# Patient Record
Sex: FEMALE | Race: WHITE | NOT HISPANIC OR LATINO | Employment: OTHER | URBAN - METROPOLITAN AREA
[De-identification: names, ages, dates, MRNs, and addresses within clinical notes are randomized per-mention and may not be internally consistent; named-entity substitution may affect disease eponyms.]

---

## 2017-01-09 ENCOUNTER — TRANSCRIBE ORDERS (OUTPATIENT)
Dept: LAB | Facility: CLINIC | Age: 72
End: 2017-01-09

## 2017-01-09 ENCOUNTER — APPOINTMENT (OUTPATIENT)
Dept: LAB | Facility: CLINIC | Age: 72
End: 2017-01-09
Payer: COMMERCIAL

## 2017-01-09 DIAGNOSIS — E03.9 UNSPECIFIED HYPOTHYROIDISM: ICD-10-CM

## 2017-01-09 DIAGNOSIS — I11.0 HYPERTENSIVE HEART DISEASE WITH CONGESTIVE HEART FAILURE (HCC): ICD-10-CM

## 2017-01-09 DIAGNOSIS — E13.8 DIABETES MELLITUS OF OTHER TYPE WITH COMPLICATION: Primary | ICD-10-CM

## 2017-01-09 DIAGNOSIS — E13.8 DIABETES MELLITUS OF OTHER TYPE WITH COMPLICATION: ICD-10-CM

## 2017-01-09 DIAGNOSIS — E78.5 HYPERLIPIDEMIA, UNSPECIFIED HYPERLIPIDEMIA TYPE: ICD-10-CM

## 2017-01-09 LAB
ALBUMIN SERPL BCP-MCNC: 4 G/DL (ref 3.5–5)
ALP SERPL-CCNC: 52 U/L (ref 46–116)
ALT SERPL W P-5'-P-CCNC: 29 U/L (ref 12–78)
ANION GAP SERPL CALCULATED.3IONS-SCNC: 7 MMOL/L (ref 4–13)
AST SERPL W P-5'-P-CCNC: 16 U/L (ref 5–45)
BASOPHILS # BLD AUTO: 0.04 THOUSANDS/ΜL (ref 0–0.1)
BASOPHILS NFR BLD AUTO: 1 % (ref 0–1)
BILIRUB DIRECT SERPL-MCNC: 0.15 MG/DL (ref 0–0.2)
BILIRUB SERPL-MCNC: 0.39 MG/DL (ref 0.2–1)
BUN SERPL-MCNC: 26 MG/DL (ref 5–25)
CALCIUM SERPL-MCNC: 9.4 MG/DL (ref 8.3–10.1)
CHLORIDE SERPL-SCNC: 104 MMOL/L (ref 100–108)
CHOLEST SERPL-MCNC: 107 MG/DL (ref 50–200)
CO2 SERPL-SCNC: 28 MMOL/L (ref 21–32)
CREAT SERPL-MCNC: 0.98 MG/DL (ref 0.6–1.3)
EOSINOPHIL # BLD AUTO: 0.13 THOUSAND/ΜL (ref 0–0.61)
EOSINOPHIL NFR BLD AUTO: 2 % (ref 0–6)
ERYTHROCYTE [DISTWIDTH] IN BLOOD BY AUTOMATED COUNT: 12.5 % (ref 11.6–15.1)
EST. AVERAGE GLUCOSE BLD GHB EST-MCNC: 148 MG/DL
GFR SERPL CREATININE-BSD FRML MDRD: 55.9 ML/MIN/1.73SQ M
GLUCOSE SERPL-MCNC: 113 MG/DL (ref 65–140)
HBA1C MFR BLD: 6.8 % (ref 4.2–6.3)
HCT VFR BLD AUTO: 43.3 % (ref 34.8–46.1)
HDLC SERPL-MCNC: 46 MG/DL (ref 40–60)
HGB BLD-MCNC: 14.3 G/DL (ref 11.5–15.4)
LDLC SERPL CALC-MCNC: 41 MG/DL (ref 0–100)
LYMPHOCYTES # BLD AUTO: 1.58 THOUSANDS/ΜL (ref 0.6–4.47)
LYMPHOCYTES NFR BLD AUTO: 21 % (ref 14–44)
MCH RBC QN AUTO: 32.1 PG (ref 26.8–34.3)
MCHC RBC AUTO-ENTMCNC: 33 G/DL (ref 31.4–37.4)
MCV RBC AUTO: 97 FL (ref 82–98)
MONOCYTES # BLD AUTO: 0.64 THOUSAND/ΜL (ref 0.17–1.22)
MONOCYTES NFR BLD AUTO: 8 % (ref 4–12)
NEUTROPHILS # BLD AUTO: 5.25 THOUSANDS/ΜL (ref 1.85–7.62)
NEUTS SEG NFR BLD AUTO: 68 % (ref 43–75)
NRBC BLD AUTO-RTO: 0 /100 WBCS
PLATELET # BLD AUTO: 265 THOUSANDS/UL (ref 149–390)
PMV BLD AUTO: 9.9 FL (ref 8.9–12.7)
POTASSIUM SERPL-SCNC: 4.9 MMOL/L (ref 3.5–5.3)
PROT SERPL-MCNC: 7.9 G/DL (ref 6.4–8.2)
RBC # BLD AUTO: 4.45 MILLION/UL (ref 3.81–5.12)
SODIUM SERPL-SCNC: 139 MMOL/L (ref 136–145)
TRIGL SERPL-MCNC: 100 MG/DL
TSH SERPL DL<=0.05 MIU/L-ACNC: 0.53 UIU/ML (ref 0.36–3.74)
WBC # BLD AUTO: 7.66 THOUSAND/UL (ref 4.31–10.16)

## 2017-01-09 PROCEDURE — 84443 ASSAY THYROID STIM HORMONE: CPT

## 2017-01-09 PROCEDURE — 80076 HEPATIC FUNCTION PANEL: CPT

## 2017-01-09 PROCEDURE — 85025 COMPLETE CBC W/AUTO DIFF WBC: CPT

## 2017-01-09 PROCEDURE — 83036 HEMOGLOBIN GLYCOSYLATED A1C: CPT

## 2017-01-09 PROCEDURE — 80048 BASIC METABOLIC PNL TOTAL CA: CPT

## 2017-01-09 PROCEDURE — 36415 COLL VENOUS BLD VENIPUNCTURE: CPT

## 2017-01-09 PROCEDURE — 80061 LIPID PANEL: CPT

## 2017-01-13 ENCOUNTER — ALLSCRIPTS OFFICE VISIT (OUTPATIENT)
Dept: OTHER | Facility: OTHER | Age: 72
End: 2017-01-13

## 2017-07-20 ENCOUNTER — TRANSCRIBE ORDERS (OUTPATIENT)
Dept: LAB | Facility: CLINIC | Age: 72
End: 2017-07-20

## 2017-07-20 ENCOUNTER — APPOINTMENT (OUTPATIENT)
Dept: LAB | Facility: CLINIC | Age: 72
End: 2017-07-20
Payer: COMMERCIAL

## 2017-07-20 DIAGNOSIS — E78.5 OTHER AND UNSPECIFIED HYPERLIPIDEMIA: ICD-10-CM

## 2017-07-20 DIAGNOSIS — E03.9 UNSPECIFIED HYPOTHYROIDISM: ICD-10-CM

## 2017-07-20 DIAGNOSIS — I10 ESSENTIAL HYPERTENSION, MALIGNANT: ICD-10-CM

## 2017-07-20 DIAGNOSIS — E11.9 DIABETES MELLITUS WITHOUT COMPLICATION (HCC): Primary | ICD-10-CM

## 2017-07-20 DIAGNOSIS — E11.9 DIABETES MELLITUS WITHOUT COMPLICATION (HCC): ICD-10-CM

## 2017-07-20 LAB
ALBUMIN SERPL BCP-MCNC: 3.9 G/DL (ref 3.5–5)
ALP SERPL-CCNC: 50 U/L (ref 46–116)
ALT SERPL W P-5'-P-CCNC: 27 U/L (ref 12–78)
ANION GAP SERPL CALCULATED.3IONS-SCNC: 7 MMOL/L (ref 4–13)
AST SERPL W P-5'-P-CCNC: 18 U/L (ref 5–45)
BILIRUB SERPL-MCNC: 0.37 MG/DL (ref 0.2–1)
BUN SERPL-MCNC: 17 MG/DL (ref 5–25)
CALCIUM SERPL-MCNC: 9.4 MG/DL (ref 8.3–10.1)
CHLORIDE SERPL-SCNC: 107 MMOL/L (ref 100–108)
CHOLEST SERPL-MCNC: 98 MG/DL (ref 50–200)
CO2 SERPL-SCNC: 27 MMOL/L (ref 21–32)
CREAT SERPL-MCNC: 0.91 MG/DL (ref 0.6–1.3)
ERYTHROCYTE [DISTWIDTH] IN BLOOD BY AUTOMATED COUNT: 13 % (ref 11.6–15.1)
EST. AVERAGE GLUCOSE BLD GHB EST-MCNC: 134 MG/DL
GFR SERPL CREATININE-BSD FRML MDRD: >60 ML/MIN/1.73SQ M
GLUCOSE P FAST SERPL-MCNC: 90 MG/DL (ref 65–99)
HBA1C MFR BLD: 6.3 % (ref 4.2–6.3)
HCT VFR BLD AUTO: 42.2 % (ref 34.8–46.1)
HDLC SERPL-MCNC: 43 MG/DL (ref 40–60)
HGB BLD-MCNC: 13.9 G/DL (ref 11.5–15.4)
LDLC SERPL CALC-MCNC: 34 MG/DL (ref 0–100)
MCH RBC QN AUTO: 31.9 PG (ref 26.8–34.3)
MCHC RBC AUTO-ENTMCNC: 32.9 G/DL (ref 31.4–37.4)
MCV RBC AUTO: 97 FL (ref 82–98)
PLATELET # BLD AUTO: 267 THOUSANDS/UL (ref 149–390)
PMV BLD AUTO: 10.2 FL (ref 8.9–12.7)
POTASSIUM SERPL-SCNC: 4.6 MMOL/L (ref 3.5–5.3)
PROT SERPL-MCNC: 7.6 G/DL (ref 6.4–8.2)
RBC # BLD AUTO: 4.36 MILLION/UL (ref 3.81–5.12)
SODIUM SERPL-SCNC: 141 MMOL/L (ref 136–145)
TRIGL SERPL-MCNC: 104 MG/DL
TSH SERPL DL<=0.05 MIU/L-ACNC: 2.13 UIU/ML (ref 0.36–3.74)
WBC # BLD AUTO: 8.07 THOUSAND/UL (ref 4.31–10.16)

## 2017-07-20 PROCEDURE — 85027 COMPLETE CBC AUTOMATED: CPT

## 2017-07-20 PROCEDURE — 83036 HEMOGLOBIN GLYCOSYLATED A1C: CPT

## 2017-07-20 PROCEDURE — 80053 COMPREHEN METABOLIC PANEL: CPT

## 2017-07-20 PROCEDURE — 84443 ASSAY THYROID STIM HORMONE: CPT

## 2017-07-20 PROCEDURE — 36415 COLL VENOUS BLD VENIPUNCTURE: CPT

## 2017-07-20 PROCEDURE — 80061 LIPID PANEL: CPT

## 2017-07-24 ENCOUNTER — ALLSCRIPTS OFFICE VISIT (OUTPATIENT)
Dept: OTHER | Facility: OTHER | Age: 72
End: 2017-07-24

## 2017-08-21 ENCOUNTER — GENERIC CONVERSION - ENCOUNTER (OUTPATIENT)
Dept: OTHER | Facility: OTHER | Age: 72
End: 2017-08-21

## 2018-01-06 ENCOUNTER — TRANSCRIBE ORDERS (OUTPATIENT)
Dept: LAB | Facility: CLINIC | Age: 73
End: 2018-01-06

## 2018-01-06 ENCOUNTER — APPOINTMENT (OUTPATIENT)
Dept: LAB | Facility: CLINIC | Age: 73
End: 2018-01-06
Payer: COMMERCIAL

## 2018-01-06 DIAGNOSIS — E78.5 HYPERLIPIDEMIA LDL GOAL <70: ICD-10-CM

## 2018-01-06 DIAGNOSIS — I51.9 MYXEDEMA HEART DISEASE: ICD-10-CM

## 2018-01-06 DIAGNOSIS — I10 ESSENTIAL HYPERTENSION, MALIGNANT: Primary | ICD-10-CM

## 2018-01-06 DIAGNOSIS — E03.9 MYXEDEMA HEART DISEASE: ICD-10-CM

## 2018-01-06 DIAGNOSIS — I10 ESSENTIAL HYPERTENSION, MALIGNANT: ICD-10-CM

## 2018-01-06 DIAGNOSIS — E11.9: ICD-10-CM

## 2018-01-06 LAB
ALBUMIN SERPL BCP-MCNC: 4.1 G/DL (ref 3.5–5)
ALP SERPL-CCNC: 49 U/L (ref 46–116)
ALT SERPL W P-5'-P-CCNC: 30 U/L (ref 12–78)
ANION GAP SERPL CALCULATED.3IONS-SCNC: 4 MMOL/L (ref 4–13)
AST SERPL W P-5'-P-CCNC: 23 U/L (ref 5–45)
BASOPHILS # BLD AUTO: 0.06 THOUSANDS/ΜL (ref 0–0.1)
BASOPHILS NFR BLD AUTO: 1 % (ref 0–1)
BILIRUB DIRECT SERPL-MCNC: 0.12 MG/DL (ref 0–0.2)
BILIRUB SERPL-MCNC: 0.45 MG/DL (ref 0.2–1)
BUN SERPL-MCNC: 30 MG/DL (ref 5–25)
CALCIUM SERPL-MCNC: 8.9 MG/DL (ref 8.3–10.1)
CHLORIDE SERPL-SCNC: 107 MMOL/L (ref 100–108)
CHOLEST SERPL-MCNC: 117 MG/DL (ref 50–200)
CO2 SERPL-SCNC: 29 MMOL/L (ref 21–32)
CREAT SERPL-MCNC: 0.86 MG/DL (ref 0.6–1.3)
CREAT UR-MCNC: 122 MG/DL
EOSINOPHIL # BLD AUTO: 0.15 THOUSAND/ΜL (ref 0–0.61)
EOSINOPHIL NFR BLD AUTO: 2 % (ref 0–6)
ERYTHROCYTE [DISTWIDTH] IN BLOOD BY AUTOMATED COUNT: 12.7 % (ref 11.6–15.1)
EST. AVERAGE GLUCOSE BLD GHB EST-MCNC: 123 MG/DL
GFR SERPL CREATININE-BSD FRML MDRD: 68 ML/MIN/1.73SQ M
GLUCOSE P FAST SERPL-MCNC: 99 MG/DL (ref 65–99)
HBA1C MFR BLD: 5.9 % (ref 4.2–6.3)
HCT VFR BLD AUTO: 41.2 % (ref 34.8–46.1)
HDLC SERPL-MCNC: 59 MG/DL (ref 40–60)
HGB BLD-MCNC: 13.7 G/DL (ref 11.5–15.4)
LDLC SERPL CALC-MCNC: 39 MG/DL (ref 0–100)
LYMPHOCYTES # BLD AUTO: 2.17 THOUSANDS/ΜL (ref 0.6–4.47)
LYMPHOCYTES NFR BLD AUTO: 30 % (ref 14–44)
MCH RBC QN AUTO: 32.6 PG (ref 26.8–34.3)
MCHC RBC AUTO-ENTMCNC: 33.3 G/DL (ref 31.4–37.4)
MCV RBC AUTO: 98 FL (ref 82–98)
MICROALBUMIN UR-MCNC: 36.6 MG/L (ref 0–20)
MICROALBUMIN/CREAT 24H UR: 30 MG/G CREATININE (ref 0–30)
MONOCYTES # BLD AUTO: 0.47 THOUSAND/ΜL (ref 0.17–1.22)
MONOCYTES NFR BLD AUTO: 6 % (ref 4–12)
NEUTROPHILS # BLD AUTO: 4.48 THOUSANDS/ΜL (ref 1.85–7.62)
NEUTS SEG NFR BLD AUTO: 61 % (ref 43–75)
NRBC BLD AUTO-RTO: 0 /100 WBCS
PLATELET # BLD AUTO: 273 THOUSANDS/UL (ref 149–390)
PMV BLD AUTO: 9.4 FL (ref 8.9–12.7)
POTASSIUM SERPL-SCNC: 4.5 MMOL/L (ref 3.5–5.3)
PROT SERPL-MCNC: 7.6 G/DL (ref 6.4–8.2)
RBC # BLD AUTO: 4.2 MILLION/UL (ref 3.81–5.12)
SODIUM SERPL-SCNC: 140 MMOL/L (ref 136–145)
TRIGL SERPL-MCNC: 93 MG/DL
TSH SERPL DL<=0.05 MIU/L-ACNC: 0.58 UIU/ML (ref 0.36–3.74)
WBC # BLD AUTO: 7.36 THOUSAND/UL (ref 4.31–10.16)

## 2018-01-06 PROCEDURE — 83036 HEMOGLOBIN GLYCOSYLATED A1C: CPT

## 2018-01-06 PROCEDURE — 84443 ASSAY THYROID STIM HORMONE: CPT

## 2018-01-06 PROCEDURE — 80048 BASIC METABOLIC PNL TOTAL CA: CPT

## 2018-01-06 PROCEDURE — 85025 COMPLETE CBC W/AUTO DIFF WBC: CPT | Performed by: INTERNAL MEDICINE

## 2018-01-06 PROCEDURE — 80061 LIPID PANEL: CPT

## 2018-01-06 PROCEDURE — 82043 UR ALBUMIN QUANTITATIVE: CPT | Performed by: INTERNAL MEDICINE

## 2018-01-06 PROCEDURE — 36415 COLL VENOUS BLD VENIPUNCTURE: CPT

## 2018-01-06 PROCEDURE — 82570 ASSAY OF URINE CREATININE: CPT | Performed by: INTERNAL MEDICINE

## 2018-01-06 PROCEDURE — 80076 HEPATIC FUNCTION PANEL: CPT

## 2018-01-10 ENCOUNTER — ALLSCRIPTS OFFICE VISIT (OUTPATIENT)
Dept: OTHER | Facility: OTHER | Age: 73
End: 2018-01-10

## 2018-01-12 NOTE — PROGRESS NOTES
Assessment   1  Diabetes (250 00) (E11 9)   2  Hyperlipidemia (272 4) (E78 5)   3  Hypertension (401 9) (I10)   4  Hypothyroidism (244 9) (E03 9)    Plan   Diabetes    · Invokana 300 MG Oral Tablet   · (1) HEMOGLOBIN A1C; Status:Active; Requested for:01Jul2018;    · (1) MICROALBUMIN CREATININE RATIO, RANDOM URINE; Status:Active; Requested for:01Jul2018;   Hyperlipidemia    · (1) LIPID PANEL, FASTING; Status:Active; Requested for:01Jul2018;   Hypertension    · (1) BASIC METABOLIC PROFILE; Status:Active; Requested for:01Jul2018;    · (1) CBC/PLT/DIFF; Status:Active; Requested for:01Jul2018;    · (1) HEPATIC FUNCTION PANEL; Status:Active; Requested for:01Jul2018;   Hypothyroidism    · (1) TSH; Status:Active; Requested for:01Jul2018; Discussion/Summary   Discussion Summary:    Lab data reviewed in detail and compared prior    2 diabetes mellitus remains under excellent control, A1c now down to 5 9, continue on metformin but discontinue Invokana, monitor your sugars as you have been  Continue with weight loss efforts, starting on an exercise regimen with a goal of 30 minutes per day, 5 days per week may be beneficial   and hyperlipidemia remained optimally controlled on present regimen    is stable on levothyroxine   maintenance is up-to-date, patient declines flu and Prevnar due to adverse reaction to Pneumovax and flu shot in 2015   follow-up after labs in 6 months, sooner as needed  Chief Complaint   Chief Complaint Chronic Condition [facility]: Patient is here today for follow up of chronic conditions described in HPI  History of Present Illness   HPI: Feeling very well, slipped on ice last week, got some bruises, but no significant injury  35 lbs last yr, gained back 6 over holidays, but plans to lose it again  -- , tolerating metformin 1000 mg bid  No urinary sx on invokana  rx as directed, bp's nl at home      regular exercise, but keeping active          Review of Systems Complete-Female:      Constitutional: No fever, no chills, feels well, no tiredness, no recent weight gain or weight loss  Eyes: No complaints of eye pain, no red eyes, no eyesight problems, no discharge, no dry eyes, no itching of eyes  ENT: no complaints of earache, no loss of hearing, no nose bleeds, no nasal discharge, no sore throat, no hoarseness  Cardiovascular: No complaints of slow heart rate, no fast heart rate, no chest pain, no palpitations, no leg claudication, no lower extremity edema  Respiratory: No complaints of shortness of breath, no wheezing, no cough, no SOB on exertion, no orthopnea, no PND,-- no shortness of breath,-- no cough,-- no orthopnea,-- no wheezing,-- no shortness of breath during exertion-- and-- no PND  Gastrointestinal: as noted in HPI  Genitourinary: No complaints of dysuria, no incontinence, no pelvic pain, no dysmenorrhea, no vaginal discharge or bleeding  Musculoskeletal: as noted in HPI  Integumentary: as noted in HPI  Neurological: No complaints of headache, no confusion, no convulsions, no numbness, no dizziness or fainting, no tingling, no limb weakness, no difficulty walking  Psychiatric: Not suicidal, no sleep disturbance, no anxiety or depression, no change in personality, no emotional problems  Endocrine: No complaints of proptosis, no hot flashes, no muscle weakness, no deepening of the voice, no feelings of weakness  Hematologic/Lymphatic: No complaints of swollen glands, no swollen glands in the neck, does not bleed easily, does not bruise easily  Active Problems   1  Active advance directive on file (V49 89) (Z78 9)   2  Arthritis (716 90) (M19 90)   3  Benign colon polyp (211 3) (K63 5)   4  Diabetes (250 00) (E11 9)   5  Encounter for routine gynecological examination with Papanicolaou smear of cervix (V72 31,V76 2)     (Z01 419)   6   Encounter for screening mammogram for malignant neoplasm of breast (V76 12) (Z12 31)   7  Fatigue (780 79) (R53 83)   8  Herpes simplex infection (054 9) (B00 9)   9  Hyperlipidemia (272 4) (E78 5)   10  Hypertension (401 9) (I10)   11  Hypothyroidism (244 9) (E03 9)   12  Lumbar radiculopathy (724 4) (M54 16)   13  Need for zoster vaccine (V04 89) (Z23)   14  Orthostasis (458 0) (I95 1)   15  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)   16  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)   17  Screening for neurological condition (V80 09) (Z13 89)   18  Screening for skin condition (V82 0) (Z13 89)   19  Seborrheic keratosis (702 19) (L82 1)   20  Serrated adenoma of colon (211 3) (D12 6)   21  Skin neoplasm (239 2) (D49 2)   22  History of Skin rash (782 1) (R21)   23  Spinal arachnoid cyst (349 2) (G96 19)   24  Vaginal burning (625 8) (N94 9)   25  Vaginal candidiasis (112 1) (B37 3)   26  Vaginal discharge (623 5) (N89 8)   27  Vaginal itching (698 1) (L29 8)   28  Vitamin D deficiency (268 9) (E55 9)   29  Vulvar burning (625 9) (N94 89)   30  Yeast vaginitis (112 1) (B37 3)    Past Medical History   1  History of Diabetes Mellitus (250 00)   2  History of Elbow fracture (812 40) (S42 409A)   3  History of Foot fracture, left (825 20) (S92 902A)   4  History of fatigue (V13 89) (Z87 898)   5  History of hypertension (V12 59) (Z86 79)   6  History of Skin rash (782 1) (R21)  Active Problems And Past Medical History Reviewed: The active problems and past medical history were reviewed and updated today  Surgical History   1  History of Appendectomy   2  History of Cholecystectomy   3  History of Dilation And Curettage   4  History of Tonsillectomy  Surgical History Reviewed: The surgical history was reviewed and updated today  Family History   Mother    1  Family history of Bowel cancer   2  Family history of Colon Cancer (V16 0)   3  Family history of asthma (V17 5) (Z82 5)  Father    4   Family history of congestive heart failure (V17 49) (Z82 49) 5  Family history of Peptic Ulcer  Paternal Grandmother    10  Family history of malignant neoplasm of stomach (V16 0) (Z80 0)  Maternal Grandfather    7  Family history of myocardial infarction (V17 3) (Z82 49)  Family History Reviewed: The family history was reviewed and updated today  Social History    · Active advance directive on file (V49 89) (Z78 9)   · Daily caffeine consumption, 1 serving a day   · Denied: History of Drug Use   · Exercise habits   · High school graduate   · Lives with spouse   ·    · Never A Smoker   · Never smoker   · Occupation: Retired   · Retired   · Sexually active   · Two children   · Denied: History of Uses drugs daily  Social History Reviewed: The social history was reviewed and updated today  Current Meds    1  Amlodipine Besy-Benazepril HCl - 5-10 MG Oral Capsule; TAKE 1 CAPSULE EVERY DAY; Therapy: 67UZZ2302 to (Evaluate:72Vcp1102)  Requested for: 89Poe0239; Last Rx:36Pyn5916     Ordered   2  Atorvastatin Calcium 20 MG Oral Tablet; Take 1 tablet daily; Therapy: 02YHG1564 to ()  Requested for: 51JVT3929; Last RH:81ENP2972     Ordered   3  Centrum Silver CHEW; CHEW AND SWALLOW 1 TABLET DAILY Recorded   4  Clotrimazole-Betamethasone 1-0 05 % External Cream; APPLY SPARINGLY TO AFFECTED AREA(S)     3 TIMES A DAY; Therapy: 27YQQ9306 to (Evaluate:09Jun2014)  Requested for: 49SIK0977; Last OD:24VID8122     Ordered   5  Ecotrin Low Strength 81 MG Oral Tablet Delayed Release; TAKE 1 TABLET DAILY; Therapy: 52LLI3575 to (Evaluate:16Mar2014)  Requested for: 75Eql5826; Last Rx:99Hmp2004     Ordered   6  Fluocinonide 0 1 % External Cream; APPLY A THIN LAYER TO AFFECTED AREA(S) TWICE DAILY; Therapy: 32PDN4760 to (Demetri Truong)  Requested for: 18QYB1393; Last Rx:04Mar2016     Ordered   7  Invokana 300 MG Oral Tablet; take 1 tablet every day;      Therapy: 11Rfm1697 to (Evaluate:58Bff4416)  Requested for: 11Ciu0557; Last Rx:25Psr6860 Ordered   8  Levothyroxine Sodium 100 MCG Oral Tablet; TAKE 1 TABLET DAILY; Therapy: 66YMI3848 to (Evaluate:06Yaa8931)  Requested for: 20BRC8840; Last Rx:90Udy7453 Ordered   9  MetFORMIN HCl  MG Oral Tablet Extended Release 24 Hour (Glucophage XR); start 1 po daily     w/ dinner, increase 500 mg each week as tolerated until taking 4 tabs; Therapy: 55LIC7379 to (Last Rx:32Jux2709)  Requested for: 41Hcv2478 Ordered   10  OneTouch Lancets MISC; USE AS DIRECTED Recorded   11  OneTouch Ultra Blue In Vitro Strip; USE TO TEST TWICE A DAY AND AS NEEDED  02; Therapy: 83MWS8326 to (Anayeli Friend)  Requested for: 13Oct2017; Last Rx:13Oct2017      Ordered   12  ValACYclovir HCl - 1 GM Oral Tablet; TAKE 2 TABLETS TWICE A DAY WITH EPISODES; Therapy: 44XQM9833 to (Evaluate:11Oct2015)  Requested for: 73JVV2174; Last Rx:91Eqc5368      Ordered  Medication List Reviewed: The medication list was reviewed and updated today  Allergies   1  Penicillins    Vitals   Vital Signs    Recorded: 73JHV8481 12:09PM   Heart Rate 76   Respiration 12   Systolic 510   Diastolic 64   Height 5 ft 4 in   Weight 156 lb 4 oz   BMI Calculated 26 82   BSA Calculated 1 76     Physical Exam        Constitutional      General appearance: No acute distress, well appearing and well nourished  Ears, Nose, Mouth, and Throat      Oropharynx: Normal with no erythema, edema, exudate or lesions  Pulmonary      Respiratory effort: No increased work of breathing or signs of respiratory distress  Auscultation of lungs: Clear to auscultation  Cardiovascular      Auscultation of heart: Normal rate and rhythm, normal S1 and S2, without murmurs  Examination of extremities for edema and/or varicosities: Normal        Abdomen      Abdomen: Non-tender, no masses  Liver and spleen: No hepatomegaly or splenomegaly  Lymphatic      Palpation of lymph nodes in neck: No lymphadenopathy  Musculoskeletal      Gait and station: Normal        Neurologic      Cranial nerves: Cranial nerves 2-12 intact  Psychiatric      Orientation to person, place, and time: Normal        Mood and affect: Normal        Diabetic Foot Screen: Normal        Socks and shoes removed, the Right Foot: the foot was normal, no swelling, no erythema  The toes on the right were normal       Socks and shoes removed, Left Foot: the foot was normal, no swelling, no erythema  The toes on the left were normal  Normal tactile sensation with monofilament testing throughout the left foot  Pulses:      1+ in the posterior tibialis on the right      1+ in the dorsalis pedis on the right  Pulses:      1+ in the posterior tibialis on the left      1+ in the dorsalis pedis on the left  Assign Risk Category: 0: No loss of protective sensation, no deformity  No present risk  Results/Data   (1) CBC/PLT/DIFF 37TRT8245 10:29AM Kelsey Galeana      Test Name Result Flag Reference   WBC COUNT 7 36 Thousand/uL  4 31-10 16   RBC COUNT 4 20 Million/uL  3 81-5 12   HEMOGLOBIN 13 7 g/dL  11 5-15 4   HEMATOCRIT 41 2 %  34 8-46  1   MCV 98 fL  82-98   MCH 32 6 pg  26 8-34 3   MCHC 33 3 g/dL  31 4-37 4   RDW 12 7 %  11 6-15 1   MPV 9 4 fL  8 9-12 7   PLATELET COUNT 788 Thousands/uL  149-390   nRBC AUTOMATED 0 /100 WBCs     NEUTROPHILS RELATIVE PERCENT 61 %  43-75   LYMPHOCYTES RELATIVE PERCENT 30 %  14-44   MONOCYTES RELATIVE PERCENT 6 %  4-12   EOSINOPHILS RELATIVE PERCENT 2 %  0-6   BASOPHILS RELATIVE PERCENT 1 %  0-1   NEUTROPHILS ABSOLUTE COUNT 4 48 Thousands/? ??L  1 85-7 62   LYMPHOCYTES ABSOLUTE COUNT 2 17 Thousands/? ??L  0 60-4 47   MONOCYTES ABSOLUTE COUNT 0 47 Thousand/? ??L  0 17-1 22   EOSINOPHILS ABSOLUTE COUNT 0 15 Thousand/? ??L  0 00-0 61   BASOPHILS ABSOLUTE COUNT 0 06 Thousands/? ??L  0 00-0 10   This is a patient instruction: This test is non-fasting  Please drink two glasses of water morning of bloodwork        (1) BASIC METABOLIC PROFILE 32PXL7855 10:29AM Paul Galeana      Test Name Result Flag Reference   SODIUM 140 mmol/L  136-145   POTASSIUM 4 5 mmol/L  3 5-5 3   CHLORIDE 107 mmol/L  100-108   CARBON DIOXIDE 29 mmol/L  21-32   ANION GAP (CALC) 4 mmol/L  4-13   BLOOD UREA NITROGEN 30 mg/dL H 5-25   CREATININE 0 86 mg/dL  0 60-1 30   Standardized to IDMS reference method   CALCIUM 8 9 mg/dL  8 3-10 1   eGFR 68 ml/min/1 73sq m     This is a patient instruction: Patient fasting for 8 hours or longer recommended  National Kidney Disease Education Program recommendations are as follows:     GFR calculation is accurate only with a steady state creatinine     Chronic Kidney disease less than 60 ml/min/1 73 sq  meters     Kidney failure less than 15 ml/min/1 73 sq  meters  GLUCOSE FASTING 99 mg/dL  65-99   Specimen collection should occur prior to Sulfasalazine administration due to the potential for falsely depressed results  Specimen collection should occur prior to Sulfapyridine administration due to the potential for falsely elevated results  (1) LIPID PANEL, FASTING 51TEY3608 10:29AM LucyPaul goldstein Leila      Test Name Result Flag Reference   CHOLESTEROL 117 mg/dL     HDL,DIRECT 59 mg/dL  40-60   Specimen collection should occur prior to Metamizole administration due to the potential for falsley depressed results  LDL CHOLESTEROL CALCULATED 39 mg/dL  0-100   This is a patient instruction: This test requires patient fasting for 10-12 hours or longer  Drinking of black coffee or black tea is acceptable  Triglyceride:           Normal <150 mg/dl      Borderline High 150-199 mg/dl      High 200-499 mg/dl      Very High >499 mg/dl               Cholesterol:          Desirable <200 mg/dl       Borderline High 200-239 mg/dl       High >239 mg/dl               HDL Cholesterol:          High>59 mg/dL       Low <41 mg/dL               This screening LDL is a calculated result        It does not have the accuracy of the Direct Measured LDL in the monitoring of patients with hyperlipidemia and/or statin therapy  Direct Measure LDL (BWN317) must be ordered separately in these patients  TRIGLYCERIDES 93 mg/dL  <=150   Specimen collection should occur prior to N-Acetylcysteine or Metamizole administration due to the potential for falsely depressed results  (1) HEPATIC FUNCTION PANEL 40UIQ3760 10:29AM Terrell Galeana      Test Name Result Flag Reference   ALBUMIN 4 1 g/dL  3 5-5 0   This is a patient instruction: This test is non-fasting  Please drink two glasses of water morning of bloodwork  ALK PHOSPHATAS 49 U/L     ALT (SGPT) 30 U/L  12-78   Specimen collection should occur prior to Sulfasalazine and/or Sulfapyridine administration due to the potential for falsely depressed results  AST(SGOT) 23 U/L  5-45   Specimen collection should occur prior to Sulfasalazine and/or Sulfapyridine administration due to the potential for falsely depressed results  BILI, DIRECT 0 12 mg/dL  0 00-0 20   BILI, TOTAL 0 45 mg/dL  0 20-1 00   TOTAL PROTEIN 7 6 g/dL  6 4-8 2      (1) TSH 95BQC2812 10:29AM Terrell Galeana [employer]      Test Name Result Flag Reference   TSH 0 576 uIU/mL  0 358-3 740   This is a patient instruction: This test is non-fasting  Please drink two glasses of water morning of bloodwork  Patients undergoing fluorescein dye angiography may retain small amounts of fluorescein in the body for 48-72 hours post procedure  Samples containing fluorescein can produce falsely depressed TSH values  If the patient had this procedure,a specimen should be resubmitted post fluorescein clearance                           The recommended reference ranges for TSH during pregnancy are as follows:     First trimester 0 1 to 2 5 uIU/mL     Second trimester  0 2 to 3 0 uIU/mL     Third trimester 0 3 to 3 0 uIU/m      (1) HEMOGLOBIN A1C 70KCV0347 10:29AM Kervin Red [address] [employer]      Test Name Result Flag Reference   HEMOGLOBIN A1C 5 9 %  4 2-6 3   EST  AVG  GLUCOSE 123 mg/dl        (1) MICROALBUMIN CREATININE RATIO, RANDOM URINE 76TPU5773 10:29AM Chau Galeana      Test Name Result Flag Reference   MICROALBUMIN/ CREAT R 30 mg/g creatinine  0-30   MICROALBUMIN,URINE 36 6 mg/L H 0 0-20 0   CREATININE URINE 122 0 mg/dL        Health Management   Benign colon polyp   COLONOSCOPY; every 5 years; Last 10BVU8184; Next Due: 22QGZ7845; Active  Diabetes   (1) HEMOGLOBIN A1C; every 6 months; Last 88YZN0701; Next Due: 74WPR5766; Active  (1) MICROALBUMIN CREATININE RATIO, RANDOM URINE; every 1 year; Last 96YBX3619; Next Due:    61OCN0529; Active  *VB - Eye Exam; every 1 year; Last 77Kxh6142; Next Due: 22Alt3240; Active  Encounter for routine gynecological examination with Papanicolaou smear of cervix   (1) THIN PREP PAP WITH IMAGING; every 5 years; Last 16CMU2973; Next Due: 10PXP2144; Active  Encounter for screening mammogram for malignant neoplasm of breast   Digital Bilateral Screening Mammogram With CAD; every 1 year; Last 73Fjy1319; Next Due:    51Zho6218; Overdue  History of Screening for diabetic retinopathy   *VB - Eye Exam; every 1 year; Last 47Och3149; Next Due: 88Xng8376; Active  History of Type 2 diabetes mellitus with hyperglycemia   *VB - Foot Exam; every 1 year; Last 09ZXR8068; Next Due: 46Uis7702; Active  Health Maintenance   Medicare Annual Wellness Visit; every 1 year; Next Due: 07QQF3570;  Overdue    Signatures    Electronically signed by : SUNNI Tirado ; Chris 10 2018 12:39PM EST                       (Author)

## 2018-01-13 VITALS
WEIGHT: 150.13 LBS | DIASTOLIC BLOOD PRESSURE: 60 MMHG | BODY MASS INDEX: 25.63 KG/M2 | HEART RATE: 80 BPM | SYSTOLIC BLOOD PRESSURE: 118 MMHG | RESPIRATION RATE: 12 BRPM | HEIGHT: 64 IN

## 2018-01-14 VITALS
RESPIRATION RATE: 16 BRPM | DIASTOLIC BLOOD PRESSURE: 72 MMHG | SYSTOLIC BLOOD PRESSURE: 112 MMHG | WEIGHT: 174.13 LBS | OXYGEN SATURATION: 96 % | HEART RATE: 83 BPM | HEIGHT: 64 IN | BODY MASS INDEX: 29.73 KG/M2

## 2018-01-22 VITALS
DIASTOLIC BLOOD PRESSURE: 64 MMHG | HEART RATE: 76 BPM | HEIGHT: 64 IN | WEIGHT: 156.25 LBS | RESPIRATION RATE: 12 BRPM | BODY MASS INDEX: 26.67 KG/M2 | SYSTOLIC BLOOD PRESSURE: 130 MMHG

## 2018-04-15 DIAGNOSIS — E11.9 TYPE 2 DIABETES MELLITUS WITHOUT COMPLICATION, WITHOUT LONG-TERM CURRENT USE OF INSULIN (HCC): Primary | ICD-10-CM

## 2018-04-15 RX ORDER — METFORMIN HYDROCHLORIDE 500 MG/1
TABLET, EXTENDED RELEASE ORAL
Qty: 120 TABLET | Refills: 5 | Status: SHIPPED | OUTPATIENT
Start: 2018-04-15 | End: 2018-07-26 | Stop reason: SDUPTHER

## 2018-04-27 DIAGNOSIS — E78.49 OTHER HYPERLIPIDEMIA: Primary | ICD-10-CM

## 2018-04-27 RX ORDER — ATORVASTATIN CALCIUM 20 MG/1
TABLET, FILM COATED ORAL
Qty: 90 TABLET | Refills: 3 | Status: SHIPPED | OUTPATIENT
Start: 2018-04-27 | End: 2019-04-19 | Stop reason: SDUPTHER

## 2018-07-06 DIAGNOSIS — I10 ESSENTIAL HYPERTENSION: Primary | ICD-10-CM

## 2018-07-06 DIAGNOSIS — E03.9 ACQUIRED HYPOTHYROIDISM: Primary | ICD-10-CM

## 2018-07-06 RX ORDER — LEVOTHYROXINE SODIUM 0.1 MG/1
TABLET ORAL
Qty: 90 TABLET | Refills: 3 | Status: SHIPPED | OUTPATIENT
Start: 2018-07-06 | End: 2019-02-08 | Stop reason: SDUPTHER

## 2018-07-08 RX ORDER — AMLODIPINE BESYLATE AND BENAZEPRIL HYDROCHLORIDE 5; 10 MG/1; MG/1
CAPSULE ORAL
Qty: 90 CAPSULE | Refills: 3 | Status: SHIPPED | OUTPATIENT
Start: 2018-07-08 | End: 2019-06-27 | Stop reason: SDUPTHER

## 2018-07-12 DIAGNOSIS — I10 ESSENTIAL HYPERTENSION: Primary | ICD-10-CM

## 2018-07-12 RX ORDER — SPIRONOLACTONE 25 MG/1
TABLET ORAL
Qty: 90 TABLET | Refills: 3 | Status: SHIPPED | OUTPATIENT
Start: 2018-07-12 | End: 2019-07-04 | Stop reason: SDUPTHER

## 2018-07-23 ENCOUNTER — APPOINTMENT (OUTPATIENT)
Dept: LAB | Facility: CLINIC | Age: 73
End: 2018-07-23
Payer: COMMERCIAL

## 2018-07-23 ENCOUNTER — TRANSCRIBE ORDERS (OUTPATIENT)
Dept: LAB | Facility: CLINIC | Age: 73
End: 2018-07-23

## 2018-07-23 DIAGNOSIS — E13.8 DIABETES MELLITUS OF OTHER TYPE WITH COMPLICATION, UNSPECIFIED WHETHER LONG TERM INSULIN USE: ICD-10-CM

## 2018-07-23 DIAGNOSIS — E13.8 DIABETES MELLITUS OF OTHER TYPE WITH COMPLICATION, UNSPECIFIED WHETHER LONG TERM INSULIN USE: Primary | ICD-10-CM

## 2018-07-23 LAB
ALBUMIN SERPL BCP-MCNC: 3.9 G/DL (ref 3.5–5)
ALP SERPL-CCNC: 49 U/L (ref 46–116)
ALT SERPL W P-5'-P-CCNC: 23 U/L (ref 12–78)
ANION GAP SERPL CALCULATED.3IONS-SCNC: 3 MMOL/L (ref 4–13)
AST SERPL W P-5'-P-CCNC: 15 U/L (ref 5–45)
BASOPHILS # BLD AUTO: 0.07 THOUSANDS/ΜL (ref 0–0.1)
BASOPHILS NFR BLD AUTO: 1 % (ref 0–1)
BILIRUB DIRECT SERPL-MCNC: 0.11 MG/DL (ref 0–0.2)
BILIRUB SERPL-MCNC: 0.32 MG/DL (ref 0.2–1)
BUN SERPL-MCNC: 20 MG/DL (ref 5–25)
CALCIUM SERPL-MCNC: 8.9 MG/DL (ref 8.3–10.1)
CHLORIDE SERPL-SCNC: 107 MMOL/L (ref 100–108)
CHOLEST SERPL-MCNC: 87 MG/DL (ref 50–200)
CO2 SERPL-SCNC: 27 MMOL/L (ref 21–32)
CREAT SERPL-MCNC: 1.03 MG/DL (ref 0.6–1.3)
CREAT UR-MCNC: 126 MG/DL
EOSINOPHIL # BLD AUTO: 0.24 THOUSAND/ΜL (ref 0–0.61)
EOSINOPHIL NFR BLD AUTO: 4 % (ref 0–6)
ERYTHROCYTE [DISTWIDTH] IN BLOOD BY AUTOMATED COUNT: 12 % (ref 11.6–15.1)
EST. AVERAGE GLUCOSE BLD GHB EST-MCNC: 126 MG/DL
GFR SERPL CREATININE-BSD FRML MDRD: 54 ML/MIN/1.73SQ M
GLUCOSE P FAST SERPL-MCNC: 104 MG/DL (ref 65–99)
HBA1C MFR BLD: 6 % (ref 4.2–6.3)
HCT VFR BLD AUTO: 41.6 % (ref 34.8–46.1)
HDLC SERPL-MCNC: 42 MG/DL (ref 40–60)
HGB BLD-MCNC: 13.3 G/DL (ref 11.5–15.4)
IMM GRANULOCYTES # BLD AUTO: 0.02 THOUSAND/UL (ref 0–0.2)
IMM GRANULOCYTES NFR BLD AUTO: 0 % (ref 0–2)
LDLC SERPL CALC-MCNC: 21 MG/DL (ref 0–100)
LYMPHOCYTES # BLD AUTO: 2.07 THOUSANDS/ΜL (ref 0.6–4.47)
LYMPHOCYTES NFR BLD AUTO: 30 % (ref 14–44)
MCH RBC QN AUTO: 31.8 PG (ref 26.8–34.3)
MCHC RBC AUTO-ENTMCNC: 32 G/DL (ref 31.4–37.4)
MCV RBC AUTO: 100 FL (ref 82–98)
MICROALBUMIN UR-MCNC: 13.9 MG/L (ref 0–20)
MICROALBUMIN/CREAT 24H UR: 11 MG/G CREATININE (ref 0–30)
MONOCYTES # BLD AUTO: 0.47 THOUSAND/ΜL (ref 0.17–1.22)
MONOCYTES NFR BLD AUTO: 7 % (ref 4–12)
NEUTROPHILS # BLD AUTO: 3.97 THOUSANDS/ΜL (ref 1.85–7.62)
NEUTS SEG NFR BLD AUTO: 58 % (ref 43–75)
NONHDLC SERPL-MCNC: 45 MG/DL
NRBC BLD AUTO-RTO: 0 /100 WBCS
PLATELET # BLD AUTO: 251 THOUSANDS/UL (ref 149–390)
PMV BLD AUTO: 10 FL (ref 8.9–12.7)
POTASSIUM SERPL-SCNC: 4.7 MMOL/L (ref 3.5–5.3)
PROT SERPL-MCNC: 7.7 G/DL (ref 6.4–8.2)
RBC # BLD AUTO: 4.18 MILLION/UL (ref 3.81–5.12)
SODIUM SERPL-SCNC: 137 MMOL/L (ref 136–145)
TRIGL SERPL-MCNC: 120 MG/DL
TSH SERPL DL<=0.05 MIU/L-ACNC: 0.87 UIU/ML (ref 0.36–3.74)
WBC # BLD AUTO: 6.84 THOUSAND/UL (ref 4.31–10.16)

## 2018-07-23 PROCEDURE — 83036 HEMOGLOBIN GLYCOSYLATED A1C: CPT

## 2018-07-23 PROCEDURE — 85025 COMPLETE CBC W/AUTO DIFF WBC: CPT

## 2018-07-23 PROCEDURE — 84443 ASSAY THYROID STIM HORMONE: CPT

## 2018-07-23 PROCEDURE — 3061F NEG MICROALBUMINURIA REV: CPT | Performed by: INTERNAL MEDICINE

## 2018-07-23 PROCEDURE — 82043 UR ALBUMIN QUANTITATIVE: CPT | Performed by: INTERNAL MEDICINE

## 2018-07-23 PROCEDURE — 80076 HEPATIC FUNCTION PANEL: CPT

## 2018-07-23 PROCEDURE — 80061 LIPID PANEL: CPT

## 2018-07-23 PROCEDURE — 82570 ASSAY OF URINE CREATININE: CPT | Performed by: INTERNAL MEDICINE

## 2018-07-23 PROCEDURE — 80048 BASIC METABOLIC PNL TOTAL CA: CPT

## 2018-07-23 PROCEDURE — 36415 COLL VENOUS BLD VENIPUNCTURE: CPT

## 2018-07-25 RX ORDER — ASPIRIN 81 MG/1
1 TABLET ORAL DAILY
COMMUNITY
Start: 2014-03-13 | End: 2019-02-08

## 2018-07-25 RX ORDER — CLOTRIMAZOLE AND BETAMETHASONE DIPROPIONATE 10; .64 MG/G; MG/G
CREAM TOPICAL AS NEEDED
COMMUNITY
Start: 2014-05-30 | End: 2020-02-20

## 2018-07-25 RX ORDER — VALACYCLOVIR HYDROCHLORIDE 1 G/1
TABLET, FILM COATED ORAL
COMMUNITY
Start: 2015-03-18 | End: 2019-02-08 | Stop reason: SDUPTHER

## 2018-07-25 RX ORDER — FLUOCINONIDE 1 MG/G
CREAM TOPICAL 2 TIMES DAILY
COMMUNITY
Start: 2016-03-04 | End: 2019-07-22

## 2018-07-25 RX ORDER — MULTIVIT-MIN/FOLIC ACID/LUTEIN 400-250MCG
1 TABLET,CHEWABLE ORAL DAILY
COMMUNITY

## 2018-07-26 ENCOUNTER — OFFICE VISIT (OUTPATIENT)
Dept: INTERNAL MEDICINE CLINIC | Facility: CLINIC | Age: 73
End: 2018-07-26
Payer: COMMERCIAL

## 2018-07-26 VITALS
DIASTOLIC BLOOD PRESSURE: 66 MMHG | BODY MASS INDEX: 27.08 KG/M2 | HEART RATE: 66 BPM | WEIGHT: 158.6 LBS | RESPIRATION RATE: 12 BRPM | SYSTOLIC BLOOD PRESSURE: 122 MMHG | HEIGHT: 64 IN

## 2018-07-26 DIAGNOSIS — Z23 NEED FOR PNEUMOCOCCAL VACCINATION: Primary | ICD-10-CM

## 2018-07-26 DIAGNOSIS — E11.9 TYPE 2 DIABETES MELLITUS WITHOUT COMPLICATION, WITHOUT LONG-TERM CURRENT USE OF INSULIN (HCC): ICD-10-CM

## 2018-07-26 DIAGNOSIS — E55.9 VITAMIN D DEFICIENCY: ICD-10-CM

## 2018-07-26 DIAGNOSIS — E53.8 B12 DEFICIENCY: ICD-10-CM

## 2018-07-26 DIAGNOSIS — I10 ESSENTIAL HYPERTENSION: ICD-10-CM

## 2018-07-26 DIAGNOSIS — Z12.39 SCREENING FOR BREAST CANCER: ICD-10-CM

## 2018-07-26 DIAGNOSIS — D75.89 MACROCYTOSIS: ICD-10-CM

## 2018-07-26 DIAGNOSIS — N32.81 OAB (OVERACTIVE BLADDER): ICD-10-CM

## 2018-07-26 DIAGNOSIS — E78.2 MIXED HYPERLIPIDEMIA: ICD-10-CM

## 2018-07-26 DIAGNOSIS — E03.9 ACQUIRED HYPOTHYROIDISM: ICD-10-CM

## 2018-07-26 PROCEDURE — 99214 OFFICE O/P EST MOD 30 MIN: CPT | Performed by: INTERNAL MEDICINE

## 2018-07-26 PROCEDURE — 3078F DIAST BP <80 MM HG: CPT | Performed by: INTERNAL MEDICINE

## 2018-07-26 PROCEDURE — 3008F BODY MASS INDEX DOCD: CPT | Performed by: INTERNAL MEDICINE

## 2018-07-26 PROCEDURE — 1160F RVW MEDS BY RX/DR IN RCRD: CPT | Performed by: INTERNAL MEDICINE

## 2018-07-26 PROCEDURE — 3074F SYST BP LT 130 MM HG: CPT | Performed by: INTERNAL MEDICINE

## 2018-07-26 PROCEDURE — 4040F PNEUMOC VAC/ADMIN/RCVD: CPT | Performed by: INTERNAL MEDICINE

## 2018-07-26 PROCEDURE — 1036F TOBACCO NON-USER: CPT | Performed by: INTERNAL MEDICINE

## 2018-07-26 PROCEDURE — G0439 PPPS, SUBSEQ VISIT: HCPCS | Performed by: INTERNAL MEDICINE

## 2018-07-26 PROCEDURE — 1101F PT FALLS ASSESS-DOCD LE1/YR: CPT | Performed by: INTERNAL MEDICINE

## 2018-07-26 RX ORDER — METFORMIN HYDROCHLORIDE 500 MG/1
1000 TABLET, EXTENDED RELEASE ORAL 2 TIMES DAILY WITH MEALS
Qty: 120 TABLET | Refills: 0
Start: 2018-07-26 | End: 2019-08-19 | Stop reason: SDUPTHER

## 2018-07-26 NOTE — PROGRESS NOTES
Assessment/Plan:    Patient Instructions   Lab data reviewed in detail and compared prior     Type 2 diabetes mellitus is under excellent control on metformin alone, A1c 6 0, continue current regimen  Increased aerobic exercise with goal 30 minutes per day, 5 days per week advised  Up-to-date with foot exam, eye exam and urine microalbumin has improved  Hypertension hyperlipidemia remained stable on present regimen    Hypothyroidism stable on levothyroxine    Overactive bladder with occasional urge incontinence of urine -check the Internet and review bladder irritants / stimulants and try to avoid these foods and beverages  Additionally try a scheduled voiding pattern during the day every 2 hours and get back to doing Kegel exercises 10 times daily  Macrocytosis without anemia may be related to metformin use, check Y05 and folic acid levels  Health maintenance -screening mammogram ordered, bone density from 2014 reviewed and normal, will check again in 2019, up-to-date with colonoscopy, due in 2021 with history of polyps,  Pneumonia vaccination incomplete, you need Prevnar 13, inquire with your insurance where this can be given additionally talk to them about Shingrix to see if this is covered  advanced directives are complete, advanced directives discussed, freezer pack discussed     routine follow-up after labs in 6 months, sooner as needed  Diagnoses and all orders for this visit:    Need for pneumococcal vaccination    OAB (overactive bladder)    Type 2 diabetes mellitus without complication, without long-term current use of insulin ([address] )  -     metFORMIN (GLUCOPHAGE-XR) 500 mg 24 hr tablet; Take 2 tablets (1,000 mg total) by mouth 2 (two) times a day with meals  -     Hemoglobin A1c (w/out EAG); Future    Acquired hypothyroidism  -     TSH, 3rd generation with Free T4 reflex; Future    Essential hypertension  -     CBC; Future  -     Comprehensive metabolic panel;  Future    Mixed hyperlipidemia  -     Lipid panel; Future    Vitamin D deficiency    Screening for breast cancer  -     Mammo screening bilateral w 3d & cad; Future    Macrocytosis  -     Vitamin B12; Future  -     Folate; Future    B12 deficiency  -     Vitamin B12; Future  -     Folate; Future    Other orders  -     Multiple Vitamins-Minerals (CENTRUM SILVER) CHEW; Chew 1 tablet daily  -     clotrimazole-betamethasone (LOTRISONE) 1-0 05 % cream; Apply topically 3 (three) times a day  -     aspirin (ECOTRIN LOW STRENGTH) 81 mg EC tablet; Take 1 tablet by mouth daily  -     Fluocinonide 0 1 % CREA; Apply topically 2 (two) times a day  -     Discontinue: Canagliflozin (INVOKANA) 300 MG TABS; Take 1 tablet by mouth daily  -     ONE TOUCH LANCETS MISC; by Does not apply route Twice daily  -     glucose blood (ONE TOUCH ULTRA TEST) test strip; by In Vitro route  -     valACYclovir (VALTREX) 1,000 mg tablet; Take by mouth         Subjective:      Patient ID: Cindy Douglas is a 68 y o  female    Feeling generally well  Walking and stretching ~1x per week  HTN/HPL-taking rx as directed, home bp's have been stable  DM-stopped invokana last mo when ran out  Am sugars higher ~125  OAB-notes urge incont on rare occasion, not doing kegels   Hypothyroid taking rx as directed  Current Outpatient Prescriptions:     amLODIPine-benazepril (LOTREL 5-10) 5-10 MG per capsule, TAKE 1 CAPSULE EVERY DAY, Disp: 90 capsule, Rfl: 3    aspirin (ECOTRIN LOW STRENGTH) 81 mg EC tablet, Take 1 tablet by mouth daily, Disp: , Rfl:     atorvastatin (LIPITOR) 20 mg tablet, TAKE 1 TABLET DAILY, Disp: 90 tablet, Rfl: 3    clotrimazole-betamethasone (LOTRISONE) 1-0 05 % cream, Apply topically 3 (three) times a day, Disp: , Rfl:     Fluocinonide 0 1 % CREA, Apply topically 2 (two) times a day, Disp: , Rfl:     glucose blood (ONE TOUCH ULTRA TEST) test strip, by In Vitro route, Disp: , Rfl:     levothyroxine 100 mcg tablet, TAKE 1 TABLET DAILY  , Disp: 90 tablet, Rfl: 3    metFORMIN (GLUCOPHAGE-XR) 500 mg 24 hr tablet, Take 2 tablets (1,000 mg total) by mouth 2 (two) times a day with meals, Disp: 120 tablet, Rfl: 0    Multiple Vitamins-Minerals (CENTRUM SILVER) CHEW, Chew 1 tablet daily, Disp: , Rfl:     ONE TOUCH LANCETS MISC, by Does not apply route Twice daily, Disp: , Rfl:     spironolactone (ALDACTONE) 25 mg tablet, TAKE 1 TABLET DAILY, Disp: 90 tablet, Rfl: 3    valACYclovir (VALTREX) 1,000 mg tablet, Take by mouth, Disp: , Rfl:     Recent Results (from the past 1008 hour(s))   TSH, 3rd generation    Collection Time: 07/23/18  8:24 AM   Result Value Ref Range    TSH 3RD GENERATON 0 869 0 358 - 3 740 uIU/mL   Basic metabolic panel    Collection Time: 07/23/18  8:24 AM   Result Value Ref Range    Sodium 137 136 - 145 mmol/L    Potassium 4 7 3 5 - 5 3 mmol/L    Chloride 107 100 - 108 mmol/L    CO2 27 21 - 32 mmol/L    Anion Gap 3 (L) 4 - 13 mmol/L    BUN 20 5 - 25 mg/dL    Creatinine 1 03 0 60 - 1 30 mg/dL    Glucose, Fasting 104 (H) 65 - 99 mg/dL    Calcium 8 9 8 3 - 10 1 mg/dL    eGFR 54 ml/min/1 73sq m   CBC and differential    Collection Time: 07/23/18  8:24 AM   Result Value Ref Range    WBC 6 84 4 31 - 10 16 Thousand/uL    RBC 4 18 3 81 - 5 12 Million/uL    Hemoglobin 13 3 11 5 - 15 4 g/dL    Hematocrit 41 6 34 8 - 46 1 %     (H) 82 - 98 fL    MCH 31 8 26 8 - 34 3 pg    MCHC 32 0 31 4 - 37 4 g/dL    RDW 12 0 11 6 - 15 1 %    MPV 10 0 8 9 - 12 7 fL    Platelets 263 706 - 661 Thousands/uL    nRBC 0 /100 WBCs    Neutrophils Relative 58 43 - 75 %    Immat GRANS % 0 0 - 2 %    Lymphocytes Relative 30 14 - 44 %    Monocytes Relative 7 4 - 12 %    Eosinophils Relative 4 0 - 6 %    Basophils Relative 1 0 - 1 %    Neutrophils Absolute 3 97 1 85 - 7 62 Thousands/µL    Immature Grans Absolute 0 02 0 00 - 0 20 Thousand/uL    Lymphocytes Absolute 2 07 0 60 - 4 47 Thousands/µL    Monocytes Absolute 0 47 0 17 - 1 22 Thousand/µL    Eosinophils Absolute 0 24 0 00 - 0 61 Thousand/µL    Basophils Absolute 0 07 0 00 - 0 10 Thousands/µL   Hepatic function panel    Collection Time: 07/23/18  8:24 AM   Result Value Ref Range    Total Bilirubin 0 32 0 20 - 1 00 mg/dL    Bilirubin, Direct 0 11 0 00 - 0 20 mg/dL    Alkaline Phosphatase 49 46 - 116 U/L    AST 15 5 - 45 U/L    ALT 23 12 - 78 U/L    Total Protein 7 7 6 4 - 8 2 g/dL    Albumin 3 9 3 5 - 5 0 g/dL   Lipid panel    Collection Time: 07/23/18  8:24 AM   Result Value Ref Range    Cholesterol 87 50 - 200 mg/dL    Triglycerides 120 <=150 mg/dL    HDL, Direct 42 40 - 60 mg/dL    LDL Calculated 21 0 - 100 mg/dL    Non-HDL-Chol (CHOL-HDL) 45 mg/dl   Hemoglobin A1C    Collection Time: 07/23/18  8:24 AM   Result Value Ref Range    Hemoglobin A1C 6 0 4 2 - 6 3 %     mg/dl   Microalbumin / creatinine urine ratio    Collection Time: 07/23/18  2:45 PM   Result Value Ref Range    Creatinine, Ur 126 0 mg/dL    Microalbum  ,U,Random 13 9 0 0 - 20 0 mg/L    Microalb Creat Ratio 11 0 - 30 mg/g creatinine       The following portions of the patient's history were reviewed and updated as appropriate: allergies, current medications, past family history, past medical history, past social history, past surgical history and problem list      Review of Systems   Constitutional: Negative for appetite change, chills, diaphoresis, fatigue, fever and unexpected weight change  HENT: Negative for congestion, hearing loss and rhinorrhea  Eyes: Negative for visual disturbance  Respiratory: Negative for cough, chest tightness, shortness of breath and wheezing  Cardiovascular: Negative for chest pain, palpitations and leg swelling  Gastrointestinal: Negative for abdominal pain and blood in stool  Endocrine: Negative for cold intolerance, heat intolerance, polydipsia and polyuria  Genitourinary: Negative for difficulty urinating, dysuria, frequency and urgency  Musculoskeletal: Negative for arthralgias and myalgias     Skin: Negative for rash  Neurological: Negative for dizziness, weakness, light-headedness and headaches  Hematological: Does not bruise/bleed easily  Psychiatric/Behavioral: Negative for dysphoric mood and sleep disturbance  Objective:      Vitals:    07/26/18 1242   BP: 122/66   Pulse: 66   Resp: 12          Physical Exam   Constitutional: She is oriented to person, place, and time  She appears well-developed and well-nourished  HENT:   Head: Normocephalic and atraumatic  Nose: Nose normal    Mouth/Throat: Oropharynx is clear and moist    Eyes: Conjunctivae and EOM are normal  Pupils are equal, round, and reactive to light  No scleral icterus  Neck: Normal range of motion  Neck supple  No JVD present  No tracheal deviation present  No thyromegaly present  Cardiovascular: Normal rate, regular rhythm and intact distal pulses  Exam reveals no gallop and no friction rub  No murmur heard  Pulses:       Dorsalis pedis pulses are 1+ on the right side, and 1+ on the left side  Posterior tibial pulses are 1+ on the right side, and 1+ on the left side  Pulmonary/Chest: Effort normal and breath sounds normal  No respiratory distress  She has no wheezes  She has no rales  Musculoskeletal: She exhibits no edema or deformity  Feet:   Right Foot:   Skin Integrity: Negative for ulcer, skin breakdown, erythema, warmth, callus or dry skin  Left Foot:   Skin Integrity: Negative for ulcer, skin breakdown, erythema, warmth, callus or dry skin  Lymphadenopathy:     She has no cervical adenopathy  Neurological: She is alert and oriented to person, place, and time  No cranial nerve deficit  Skin: Skin is warm and dry  No rash noted  No erythema  No pallor  Psychiatric: She has a normal mood and affect  Her behavior is normal  Judgment and thought content normal    Patient's shoes and socks removed  Right Foot/Ankle   Right Foot Inspection  Skin Exam: skin normal and skin intact no dry skin, no warmth, no callus, no erythema, no maceration, no abnormal color, no pre-ulcer, no ulcer and no callus                          Toe Exam: ROM and strength within normal limits  Sensory       Monofilament testing: intact  Vascular  Capillary refills: < 3 seconds  The right DP pulse is 1+  The right PT pulse is 1+  Left Foot/Ankle  Left Foot Inspection  Skin Exam: skin normal and skin intactno dry skin, no warmth, no erythema, no maceration, normal color, no pre-ulcer, no ulcer and no callus                         Toe Exam: ROM and strength within normal limits                   Sensory       Monofilament: intact  Vascular  Capillary refills: < 3 seconds  The left DP pulse is 1+  The left PT pulse is 1+  Assign Risk Category:  No deformity present;  No loss of protective sensation;        Risk: 0

## 2018-07-26 NOTE — PATIENT INSTRUCTIONS
Lab data reviewed in detail and compared prior     Type 2 diabetes mellitus is under excellent control on metformin alone, A1c 6 0, continue current regimen  Increased aerobic exercise with goal 30 minutes per day, 5 days per week advised  Up-to-date with foot exam, eye exam and urine microalbumin has improved  Hypertension hyperlipidemia remained stable on present regimen    Hypothyroidism stable on levothyroxine    Overactive bladder with occasional urge incontinence of urine -check the Internet and review bladder irritants / stimulants and try to avoid these foods and beverages  Additionally try a scheduled voiding pattern during the day every 2 hours and get back to doing Kegel exercises 10 times daily  Macrocytosis without anemia may be related to metformin use, check S68 and folic acid levels  Health maintenance -screening mammogram ordered, bone density from 2014 reviewed and normal, will check again in 2019, up-to-date with colonoscopy, due in 2021 with history of polyps,  Pneumonia vaccination incomplete, you need Prevnar 13, inquire with your insurance where this can be given additionally talk to them about Shingrix to see if this is covered  advanced directives are complete, advanced directives discussed, freezer pack discussed     routine follow-up after labs in 6 months, sooner as needed

## 2018-07-26 NOTE — PROGRESS NOTES
HPI:  Sia Buchanan is a 68 y o  female here for her Subsequent Wellness Visit  There is no problem list on file for this patient  Past Medical History:   Diagnosis Date    Diabetes mellitus ([address] )     Elbow fracture     Foot fracture, left     Hypertension      Past Surgical History:   Procedure Laterality Date    APPENDECTOMY      CHOLECYSTECTOMY      DILATION AND CURETTAGE OF UTERUS  1995    TONSILLECTOMY       Family History   Problem Relation Age of Onset    Colon cancer Mother         Bowel    Asthma Mother     Heart failure Father         Congestive    Ulcers Father     Heart attack Maternal Grandfather         Myocardial infarction    Stomach cancer Paternal Grandmother      History   Smoking Status    Never Smoker   Smokeless Tobacco    Never Used     History   Alcohol Use    Yes     Comment: rare      History   Drug Use No     /66 (BP Location: Left arm, Patient Position: Sitting)   Pulse 66   Resp 12   Ht 5' 4" (1 626 m)   Wt 71 9 kg (158 lb 9 6 oz)   BMI 27 22 kg/m²       Current Outpatient Prescriptions   Medication Sig Dispense Refill    amLODIPine-benazepril (LOTREL 5-10) 5-10 MG per capsule TAKE 1 CAPSULE EVERY DAY 90 capsule 3    aspirin (ECOTRIN LOW STRENGTH) 81 mg EC tablet Take 1 tablet by mouth daily      atorvastatin (LIPITOR) 20 mg tablet TAKE 1 TABLET DAILY 90 tablet 3    clotrimazole-betamethasone (LOTRISONE) 1-0 05 % cream Apply topically 3 (three) times a day      Fluocinonide 0 1 % CREA Apply topically 2 (two) times a day      glucose blood (ONE TOUCH ULTRA TEST) test strip by In Vitro route      levothyroxine 100 mcg tablet TAKE 1 TABLET DAILY   90 tablet 3    metFORMIN (GLUCOPHAGE-XR) 500 mg 24 hr tablet START WITH 1 TABLET DAILY W/ DINNER, INCREASE BY 1 TAB EACH WEEK AS TOLERATED UNTIL TAKING 4 TABS 120 tablet 5    Multiple Vitamins-Minerals (CENTRUM SILVER) CHEW Chew 1 tablet daily      ONE TOUCH LANCETS MISC by Does not apply route Twice daily      spironolactone (ALDACTONE) 25 mg tablet TAKE 1 TABLET DAILY 90 tablet 3    valACYclovir (VALTREX) 1,000 mg tablet Take by mouth      Canagliflozin (INVOKANA) 300 MG TABS Take 1 tablet by mouth daily       No current facility-administered medications for this visit        Allergies   Allergen Reactions    Penicillins      Immunization History   Administered Date(s) Administered    Influenza TIV (IM) 08/31/2015, 01/13/2017    Pneumococcal Polysaccharide PPV23 08/31/2015    Tdap 04/23/2010    Zoster 1945       Patient Care Team:  Trung Rivero MD as PCP - Connie Fraser MD      Medicare Screening Tests and Risk Assessments:  AWV Clinical     ISAR:   Previous hospitalizations?:  No       Once in a Lifetime Medicare Screening:   AAA screening performed? (if performed, please add date to Health Maintenance):  No       Medicare Screening Tests and Risk Assessment:   AAA Risk Assessment    Osteoporosis Risk Assessment     Female:  Yes   :  Yes    Age over 48:  Yes    HIV Risk Assessment        Drug and Alcohol Use:   Tobacco use    Cigarettes:  never smoker    Tobacco use duration    Tobacco Cessation Readiness    Alcohol use    Alcohol use:  occasional use    Concern about alcohol use:  No Tolerance to alcohol:  No   Attempts to cut down:  No Guilt about use:  No   Patient concern:  No Annoyed by criticism:  No   Morning drinking:  No Family concern:  No   Alcohol Treatment Readiness   Illicit Drug Use    Drug use:  never        Diet & Exercise:   Diet   What is your diet?:  Regular   How many servings a day of the following:   Fruits and Vegetables:  1-2 Meat:  1-2   Dairy:  1    Coffee:  3    Exercise    Do you currently exercise?:  yes    Frequency:  rarely    Type of exercise:  walking   stretching         Cognitive Impairment Screening:   Depression screening preformed:  Yes Depression screen score:  0   Geriatric Depression scale score:  0 PHQ-9 Depression scale score: 0   Cognitive Impairment Screening    Do you have difficulty learning or retaining new information?:  No Do you have difficulty handling new tasks?:  No   Do you have difficulty with reasoning?:  No Do you have difficulty with spatial ability and orientation?:  No   Do you have difficulty with language?:  No Do you have difficulty with behavior?:  No       Functional Ability/Level of Safety:   Hearing     Bilateral:  normal   Left:  normal Right:  normal   Hearing aid:  No    Hearing Impairment Assessment    Do your family members ever complain that you turn on the radio or T V  too loudly?:  No Do you find that other people have to repeat themselves when talking to you?:  No   Do you have difficulty hearing while talking on the phone?:  No    Do you have trouble hearing the doorbell or phone ringing?:  No Do you have difficulty hearing such that you feel frustrated talking to people?:  No   Do you feel sad because you cannot hear well?:  No Do you feel inconvienced due to your hearing problem?:  No   Do you think you would be a happier person if you could hear better?:  No Would you be willing to go for a hearing aid fitting if suggested?:  No   Current Activities    Status:  unlimited ADL's, unlimited social activities, limited driving   Help needed with the folllowing:    Using the phone:  No Transportation:  No   Shopping:  No Preparing Meals:  No   Doing Housework:  No Doing Laundry:  No   Managing Medications:  No Managing Money:  No   ADL    Feeding:  Independant   Oral hygiene and Facial grooming:  Independant   Bathing:  Independant   Upper Body Dressing:  Independant   Lower Body Dressing:  Independant   Toileting:  Independant   Bed Mobility:  Independant   Fall Risk   Have you fallen in the last 12 months?:  No Are you unsteady on your feet?:  No   How many times?:  0 Are you taking any medications that may cause fatigue or dizziness?:  No    Do you rush to the bathroom potentially risking a fall?:  No Injury History       Home Safety:   Are there hazards in your environment?:  No   If you fell, would you need help to get back up from the ground?:  Yes Do you have problems or concerns getting in/out of a bed, chair, tub, or toilet?:  No   Do you feel unsteady when walking?:  No Is your activity limited by pain?:  No   Do you have handrails and grab-bars in the home?:  Yes Are emergency numbers kept by the phone and regularly updated?:  Yes   Are you and/or family members aware of the dangers of smoking in bed?:  Yes    Do you have working smoke alarms and fire extinguisher?:  Yes Do all household members know how to use them?:  Yes   Have you left the stove on unsupervised?:  No    Home Safety Risk Factors   Unfamilar with surroundings:  No Uneven floors:  No   Stairs or handrail saftey risk:  No Loose rugs:  No   Household clutter:  No Poor household lighting:  No   No grab bars in bathroom:  No Further evaluation needed:  No       Advanced Directives:   Advanced Directives    Advanced directive:  Yes    Patient's End of Life Decisions        Urinary Incontinence:       Glaucoma:            Provider Screening    No data filed        No exam data present  Reviewed Updated [facility] Prior Wellness Visits:   Last Medicare wellness visit information was reviewed, patient interviewed , no change since last AWVyes  Last Medicare wellness visit information was reviewed, patient interviewed and updates made to the record today yes    Assessment and Plan:  No diagnosis found      Health Maintenance Due   Topic Date Due    Hepatitis C Screening  1945    Depression Screening PHQ-9  1945    Medicare Annual Wellness Visit (AWV)  1945    SLP PLAN OF CARE  1945    CRC Screening: Colonoscopy  1945    Fall Risk  03/07/2010    Urinary Incontinence Screening  03/07/2010    GLAUCOMA SCREENING 72 + YR  03/07/2012    Diabetic Foot Exam  07/24/2018

## 2018-08-20 LAB
LEFT EYE DIABETIC RETINOPATHY: NORMAL
RIGHT EYE DIABETIC RETINOPATHY: NORMAL

## 2018-08-20 PROCEDURE — 3072F LOW RISK FOR RETINOPATHY: CPT | Performed by: INTERNAL MEDICINE

## 2018-10-06 DIAGNOSIS — E11.9 TYPE 2 DIABETES MELLITUS WITHOUT COMPLICATION, WITHOUT LONG-TERM CURRENT USE OF INSULIN (HCC): ICD-10-CM

## 2018-10-07 RX ORDER — METFORMIN HYDROCHLORIDE 500 MG/1
TABLET, EXTENDED RELEASE ORAL
Qty: 120 TABLET | Refills: 5 | Status: SHIPPED | OUTPATIENT
Start: 2018-10-07 | End: 2019-02-08

## 2018-10-24 ENCOUNTER — HOSPITAL ENCOUNTER (OUTPATIENT)
Dept: MAMMOGRAPHY | Facility: CLINIC | Age: 73
Discharge: HOME | End: 2018-10-24
Payer: COMMERCIAL

## 2018-10-24 DIAGNOSIS — Z12.39 SCREENING FOR BREAST CANCER: ICD-10-CM

## 2018-10-24 PROCEDURE — 77063 BREAST TOMOSYNTHESIS BI: CPT

## 2018-10-24 PROCEDURE — 77067 SCR MAMMO BI INCL CAD: CPT

## 2019-02-03 DIAGNOSIS — E11.9 TYPE 2 DIABETES MELLITUS WITHOUT COMPLICATION, WITHOUT LONG-TERM CURRENT USE OF INSULIN (HCC): Primary | ICD-10-CM

## 2019-02-04 ENCOUNTER — APPOINTMENT (OUTPATIENT)
Dept: LAB | Facility: CLINIC | Age: 74
End: 2019-02-04
Payer: COMMERCIAL

## 2019-02-04 DIAGNOSIS — E78.2 MIXED HYPERLIPIDEMIA: ICD-10-CM

## 2019-02-04 DIAGNOSIS — E11.9 TYPE 2 DIABETES MELLITUS WITHOUT COMPLICATION, WITHOUT LONG-TERM CURRENT USE OF INSULIN (HCC): Primary | ICD-10-CM

## 2019-02-04 DIAGNOSIS — I10 ESSENTIAL HYPERTENSION: ICD-10-CM

## 2019-02-04 DIAGNOSIS — E03.9 ACQUIRED HYPOTHYROIDISM: ICD-10-CM

## 2019-02-04 DIAGNOSIS — D75.89 MACROCYTOSIS: ICD-10-CM

## 2019-02-04 DIAGNOSIS — E53.8 B12 DEFICIENCY: ICD-10-CM

## 2019-02-04 LAB
ALBUMIN SERPL BCP-MCNC: 3.9 G/DL (ref 3.5–5)
ALP SERPL-CCNC: 46 U/L (ref 46–116)
ALT SERPL W P-5'-P-CCNC: 26 U/L (ref 12–78)
ANION GAP SERPL CALCULATED.3IONS-SCNC: 5 MMOL/L (ref 4–13)
AST SERPL W P-5'-P-CCNC: 17 U/L (ref 5–45)
BILIRUB SERPL-MCNC: 0.36 MG/DL (ref 0.2–1)
BUN SERPL-MCNC: 19 MG/DL (ref 5–25)
CALCIUM SERPL-MCNC: 9 MG/DL (ref 8.3–10.1)
CHLORIDE SERPL-SCNC: 107 MMOL/L (ref 100–108)
CHOLEST SERPL-MCNC: 102 MG/DL (ref 50–200)
CO2 SERPL-SCNC: 27 MMOL/L (ref 21–32)
CREAT SERPL-MCNC: 0.9 MG/DL (ref 0.6–1.3)
ERYTHROCYTE [DISTWIDTH] IN BLOOD BY AUTOMATED COUNT: 12.4 % (ref 11.6–15.1)
EST. AVERAGE GLUCOSE BLD GHB EST-MCNC: 137 MG/DL
FOLATE SERPL-MCNC: >20 NG/ML (ref 3.1–17.5)
GFR SERPL CREATININE-BSD FRML MDRD: 64 ML/MIN/1.73SQ M
GLUCOSE P FAST SERPL-MCNC: 99 MG/DL (ref 65–99)
HBA1C MFR BLD: 6.4 % (ref 4.2–6.3)
HCT VFR BLD AUTO: 39.9 % (ref 34.8–46.1)
HDLC SERPL-MCNC: 45 MG/DL (ref 40–60)
HGB BLD-MCNC: 12.9 G/DL (ref 11.5–15.4)
LDLC SERPL CALC-MCNC: 35 MG/DL (ref 0–100)
MCH RBC QN AUTO: 32.4 PG (ref 26.8–34.3)
MCHC RBC AUTO-ENTMCNC: 32.3 G/DL (ref 31.4–37.4)
MCV RBC AUTO: 100 FL (ref 82–98)
NONHDLC SERPL-MCNC: 57 MG/DL
PLATELET # BLD AUTO: 256 THOUSANDS/UL (ref 149–390)
PMV BLD AUTO: 9.9 FL (ref 8.9–12.7)
POTASSIUM SERPL-SCNC: 4.7 MMOL/L (ref 3.5–5.3)
PROT SERPL-MCNC: 7.2 G/DL (ref 6.4–8.2)
RBC # BLD AUTO: 3.98 MILLION/UL (ref 3.81–5.12)
SODIUM SERPL-SCNC: 139 MMOL/L (ref 136–145)
T4 FREE SERPL-MCNC: 1.26 NG/DL (ref 0.76–1.46)
TRIGL SERPL-MCNC: 109 MG/DL
TSH SERPL DL<=0.05 MIU/L-ACNC: 0.35 UIU/ML (ref 0.36–3.74)
VIT B12 SERPL-MCNC: 251 PG/ML (ref 100–900)
WBC # BLD AUTO: 7.29 THOUSAND/UL (ref 4.31–10.16)

## 2019-02-04 PROCEDURE — 84439 ASSAY OF FREE THYROXINE: CPT

## 2019-02-04 PROCEDURE — 85027 COMPLETE CBC AUTOMATED: CPT

## 2019-02-04 PROCEDURE — 36415 COLL VENOUS BLD VENIPUNCTURE: CPT

## 2019-02-04 PROCEDURE — 80053 COMPREHEN METABOLIC PANEL: CPT

## 2019-02-04 PROCEDURE — 82607 VITAMIN B-12: CPT

## 2019-02-04 PROCEDURE — 82746 ASSAY OF FOLIC ACID SERUM: CPT

## 2019-02-04 PROCEDURE — 84443 ASSAY THYROID STIM HORMONE: CPT

## 2019-02-04 PROCEDURE — 83036 HEMOGLOBIN GLYCOSYLATED A1C: CPT

## 2019-02-04 PROCEDURE — 80061 LIPID PANEL: CPT

## 2019-02-08 ENCOUNTER — OFFICE VISIT (OUTPATIENT)
Dept: INTERNAL MEDICINE CLINIC | Facility: CLINIC | Age: 74
End: 2019-02-08
Payer: COMMERCIAL

## 2019-02-08 VITALS
HEART RATE: 68 BPM | BODY MASS INDEX: 28.39 KG/M2 | WEIGHT: 165.4 LBS | DIASTOLIC BLOOD PRESSURE: 58 MMHG | SYSTOLIC BLOOD PRESSURE: 130 MMHG

## 2019-02-08 DIAGNOSIS — E78.2 MIXED HYPERLIPIDEMIA: ICD-10-CM

## 2019-02-08 DIAGNOSIS — E03.9 ACQUIRED HYPOTHYROIDISM: ICD-10-CM

## 2019-02-08 DIAGNOSIS — E11.9 TYPE 2 DIABETES MELLITUS WITHOUT COMPLICATION, WITHOUT LONG-TERM CURRENT USE OF INSULIN (HCC): ICD-10-CM

## 2019-02-08 DIAGNOSIS — B00.9 HERPES SIMPLEX INFECTION: Primary | ICD-10-CM

## 2019-02-08 DIAGNOSIS — E28.39 MENOPAUSE OVARIAN FAILURE: ICD-10-CM

## 2019-02-08 DIAGNOSIS — I10 ESSENTIAL HYPERTENSION: ICD-10-CM

## 2019-02-08 PROCEDURE — 99214 OFFICE O/P EST MOD 30 MIN: CPT | Performed by: INTERNAL MEDICINE

## 2019-02-08 PROCEDURE — 1036F TOBACCO NON-USER: CPT | Performed by: INTERNAL MEDICINE

## 2019-02-08 PROCEDURE — 1160F RVW MEDS BY RX/DR IN RCRD: CPT | Performed by: INTERNAL MEDICINE

## 2019-02-08 PROCEDURE — 3075F SYST BP GE 130 - 139MM HG: CPT | Performed by: INTERNAL MEDICINE

## 2019-02-08 PROCEDURE — 3078F DIAST BP <80 MM HG: CPT | Performed by: INTERNAL MEDICINE

## 2019-02-08 RX ORDER — VALACYCLOVIR HYDROCHLORIDE 1 G/1
1000 TABLET, FILM COATED ORAL DAILY
Qty: 14 TABLET | Refills: 0 | Status: SHIPPED | OUTPATIENT
Start: 2019-02-08 | End: 2019-06-07 | Stop reason: SDUPTHER

## 2019-02-08 RX ORDER — LEVOTHYROXINE SODIUM 0.1 MG/1
TABLET ORAL
Qty: 90 TABLET | Refills: 3
Start: 2019-02-08 | End: 2019-05-10

## 2019-02-08 NOTE — PROGRESS NOTES
Assessment/Plan:    Patient Instructions   Lab data reviewed in detail and compared to prior    Type 2 diabetes mellitus is under good control on metformin alone with A1c 6 4, continue with same    Hypertension and hyperlipidemia remained stable on present regimen    Multiple cardiac risk factors-we reviewed results of recent data and based upon recent data will discontinue baby aspirin and monitor future palpitations  HSV infection-try 2000 mg every 12 hours x2 doses for outbreaks, refill    Hypothyroidism-TSH is suppressed, cut 100 mcg per week, recheck TSH and free T4 in 6 weeks, follow up result by phone, lab ordered for approximately March 25th  Health maintenance is up-to-date, last bone density was in June of 2014 and normal, will do a 5 year follow-up prior to our follow-up visit in 6 months    Follow-up after labs in 6 months, sooner as needed  Diagnoses and all orders for this visit:    Herpes simplex infection  -     valACYclovir (VALTREX) 1,000 mg tablet; Take 1 tablet (1,000 mg total) by mouth daily for 14 days    Type 2 diabetes mellitus without complication, without long-term current use of insulin (HCC)  -     ONE TOUCH LANCETS MISC; by Does not apply route daily Test daily  -     glucose blood (ONE TOUCH ULTRA TEST) test strip; 1 each by Other route daily Use as instructed  -     Hemoglobin A1C; Future    Acquired hypothyroidism  -     levothyroxine 100 mcg tablet; 1 po daily 6 days per week  -     TSH, 3rd generation with Free T4 reflex; Future  -     TSH, 3rd generation with Free T4 reflex; Future    Essential hypertension  -     CBC and differential; Future  -     Comprehensive metabolic panel; Future    Mixed hyperlipidemia  -     Lipid panel; Future    Menopause ovarian failure  -     DXA bone density spine hip and pelvis;  Future         Subjective:      Patient ID: Danilo Santiago is a 68 y o  female    Feeling generally well  Keeping active, but no regular exercise  HTN/HPL-taking rx as directed, home bp's have been stable  DM-sugars a bit sporadic since off invokana  Taking metformin as directed  Am sugars higher ~125  OAB-notes urge incont on rare occasion, started doing kegels 10x per day  Hypothyroid taking rx as directed  Recent oral hsv outbreak, needs refill  Taking 2000 then 1000 the following day            Current Outpatient Prescriptions:     amLODIPine-benazepril (LOTREL 5-10) 5-10 MG per capsule, TAKE 1 CAPSULE EVERY DAY, Disp: 90 capsule, Rfl: 3    atorvastatin (LIPITOR) 20 mg tablet, TAKE 1 TABLET DAILY, Disp: 90 tablet, Rfl: 3    clotrimazole-betamethasone (LOTRISONE) 1-0 05 % cream, Apply topically 3 (three) times a day, Disp: , Rfl:     Fluocinonide 0 1 % CREA, Apply topically 2 (two) times a day, Disp: , Rfl:     glucose blood (ONE TOUCH ULTRA TEST) test strip, 1 each by Other route daily Use as instructed, Disp: 100 each, Rfl: 2    levothyroxine 100 mcg tablet, 1 po daily 6 days per week, Disp: 90 tablet, Rfl: 3    metFORMIN (GLUCOPHAGE-XR) 500 mg 24 hr tablet, Take 2 tablets (1,000 mg total) by mouth 2 (two) times a day with meals, Disp: 120 tablet, Rfl: 0    Multiple Vitamins-Minerals (CENTRUM SILVER) CHEW, Chew 1 tablet daily, Disp: , Rfl:     ONE TOUCH LANCETS MISC, by Does not apply route daily Test daily, Disp: 100 each, Rfl: 2    spironolactone (ALDACTONE) 25 mg tablet, TAKE 1 TABLET DAILY, Disp: 90 tablet, Rfl: 3    valACYclovir (VALTREX) 1,000 mg tablet, Take 1 tablet (1,000 mg total) by mouth daily for 14 days, Disp: 14 tablet, Rfl: 0    Recent Results (from the past 1008 hour(s))   Vitamin B12    Collection Time: 02/04/19 12:10 PM   Result Value Ref Range    Vitamin B-12 251 100 - 900 pg/mL   Folate    Collection Time: 02/04/19 12:10 PM   Result Value Ref Range    Folate >20 0 (H) 3 1 - 17 5 ng/mL   TSH, 3rd generation with Free T4 reflex    Collection Time: 02/04/19 12:10 PM   Result Value Ref Range    TSH 3RD GENERATON 0 347 (L) 0 358 - 3 740 uIU/mL   CBC    Collection Time: 02/04/19 12:10 PM   Result Value Ref Range    WBC 7 29 4 31 - 10 16 Thousand/uL    RBC 3 98 3 81 - 5 12 Million/uL    Hemoglobin 12 9 11 5 - 15 4 g/dL    Hematocrit 39 9 34 8 - 46 1 %     (H) 82 - 98 fL    MCH 32 4 26 8 - 34 3 pg    MCHC 32 3 31 4 - 37 4 g/dL    RDW 12 4 11 6 - 15 1 %    Platelets 740 246 - 895 Thousands/uL    MPV 9 9 8 9 - 12 7 fL   Comprehensive metabolic panel    Collection Time: 02/04/19 12:10 PM   Result Value Ref Range    Sodium 139 136 - 145 mmol/L    Potassium 4 7 3 5 - 5 3 mmol/L    Chloride 107 100 - 108 mmol/L    CO2 27 21 - 32 mmol/L    ANION GAP 5 4 - 13 mmol/L    BUN 19 5 - 25 mg/dL    Creatinine 0 90 0 60 - 1 30 mg/dL    Glucose, Fasting 99 65 - 99 mg/dL    Calcium 9 0 8 3 - 10 1 mg/dL    AST 17 5 - 45 U/L    ALT 26 12 - 78 U/L    Alkaline Phosphatase 46 46 - 116 U/L    Total Protein 7 2 6 4 - 8 2 g/dL    Albumin 3 9 3 5 - 5 0 g/dL    Total Bilirubin 0 36 0 20 - 1 00 mg/dL    eGFR 64 ml/min/1 73sq m   Lipid panel    Collection Time: 02/04/19 12:10 PM   Result Value Ref Range    Cholesterol 102 50 - 200 mg/dL    Triglycerides 109 <=150 mg/dL    HDL, Direct 45 40 - 60 mg/dL    LDL Calculated 35 0 - 100 mg/dL    Non-HDL-Chol (CHOL-HDL) 57 mg/dl   Hemoglobin A1C    Collection Time: 02/04/19 12:10 PM   Result Value Ref Range    Hemoglobin A1C 6 4 (H) 4 2 - 6 3 %     mg/dl   T4, free    Collection Time: 02/04/19 12:10 PM   Result Value Ref Range    Free T4 1 26 0 76 - 1 46 ng/dL       The following portions of the patient's history were reviewed and updated as appropriate: allergies, current medications, past family history, past medical history, past social history, past surgical history and problem list      Review of Systems      Objective:      Vitals:    02/08/19 1311   BP: 130/58   Pulse: 68          Physical Exam

## 2019-02-08 NOTE — PATIENT INSTRUCTIONS
Lab data reviewed in detail and compared to prior    Type 2 diabetes mellitus is under good control on metformin alone with A1c 6 4, continue with same    Hypertension and hyperlipidemia remained stable on present regimen    Multiple cardiac risk factors-we reviewed results of recent data and based upon recent data will discontinue baby aspirin and monitor future palpitations  HSV infection-try 2000 mg every 12 hours x2 doses for outbreaks, refill    Hypothyroidism-TSH is suppressed, cut 100 mcg per week, recheck TSH and free T4 in 6 weeks, follow up result by phone, lab ordered for approximately March 25th  Health maintenance is up-to-date, last bone density was in June of 2014 and normal, will do a 5 year follow-up prior to our follow-up visit in 6 months    Follow-up after labs in 6 months, sooner as needed

## 2019-03-26 DIAGNOSIS — E11.9 TYPE 2 DIABETES MELLITUS WITHOUT COMPLICATION, WITHOUT LONG-TERM CURRENT USE OF INSULIN (HCC): ICD-10-CM

## 2019-03-26 RX ORDER — METFORMIN HYDROCHLORIDE 500 MG/1
TABLET, EXTENDED RELEASE ORAL
Qty: 120 TABLET | Refills: 5 | Status: SHIPPED | OUTPATIENT
Start: 2019-03-26 | End: 2019-05-09

## 2019-04-19 DIAGNOSIS — E78.49 OTHER HYPERLIPIDEMIA: ICD-10-CM

## 2019-04-19 RX ORDER — ATORVASTATIN CALCIUM 20 MG/1
TABLET, FILM COATED ORAL
Qty: 90 TABLET | Refills: 3 | Status: SHIPPED | OUTPATIENT
Start: 2019-04-19 | End: 2020-01-02 | Stop reason: ALTCHOICE

## 2019-05-01 ENCOUNTER — OFFICE VISIT (OUTPATIENT)
Dept: OBGYN CLINIC | Age: 74
End: 2019-05-01
Payer: COMMERCIAL

## 2019-05-01 VITALS
BODY MASS INDEX: 29.02 KG/M2 | SYSTOLIC BLOOD PRESSURE: 130 MMHG | DIASTOLIC BLOOD PRESSURE: 80 MMHG | WEIGHT: 170 LBS | HEIGHT: 64 IN

## 2019-05-01 DIAGNOSIS — N81.4 CYSTOCELE WITH UTERINE PROLAPSE: Primary | ICD-10-CM

## 2019-05-01 DIAGNOSIS — N81.4 UTERINE PROLAPSE: ICD-10-CM

## 2019-05-01 PROCEDURE — 99202 OFFICE O/P NEW SF 15 MIN: CPT | Performed by: OBSTETRICS & GYNECOLOGY

## 2019-05-09 ENCOUNTER — APPOINTMENT (OUTPATIENT)
Dept: LAB | Facility: CLINIC | Age: 74
End: 2019-05-09
Payer: COMMERCIAL

## 2019-05-09 ENCOUNTER — OFFICE VISIT (OUTPATIENT)
Dept: INTERNAL MEDICINE CLINIC | Facility: CLINIC | Age: 74
End: 2019-05-09
Payer: COMMERCIAL

## 2019-05-09 ENCOUNTER — TRANSCRIBE ORDERS (OUTPATIENT)
Dept: LAB | Facility: CLINIC | Age: 74
End: 2019-05-09

## 2019-05-09 VITALS
DIASTOLIC BLOOD PRESSURE: 62 MMHG | WEIGHT: 164.4 LBS | SYSTOLIC BLOOD PRESSURE: 124 MMHG | HEIGHT: 64 IN | BODY MASS INDEX: 28.07 KG/M2 | TEMPERATURE: 98.3 F | OXYGEN SATURATION: 99 % | HEART RATE: 77 BPM

## 2019-05-09 DIAGNOSIS — H81.11 BENIGN PAROXYSMAL POSITIONAL VERTIGO OF RIGHT EAR: Primary | ICD-10-CM

## 2019-05-09 DIAGNOSIS — R19.7 DIARRHEA, UNSPECIFIED TYPE: ICD-10-CM

## 2019-05-09 DIAGNOSIS — J30.1 SEASONAL ALLERGIC RHINITIS DUE TO POLLEN: ICD-10-CM

## 2019-05-09 DIAGNOSIS — M54.16 LUMBAR RADICULOPATHY: ICD-10-CM

## 2019-05-09 DIAGNOSIS — E03.9 ACQUIRED HYPOTHYROIDISM: ICD-10-CM

## 2019-05-09 LAB
ALBUMIN SERPL BCP-MCNC: 4 G/DL (ref 3.5–5)
ALP SERPL-CCNC: 51 U/L (ref 46–116)
ALT SERPL W P-5'-P-CCNC: 29 U/L (ref 12–78)
ANION GAP SERPL CALCULATED.3IONS-SCNC: 5 MMOL/L (ref 4–13)
AST SERPL W P-5'-P-CCNC: 16 U/L (ref 5–45)
BASOPHILS # BLD AUTO: 0.07 THOUSANDS/ΜL (ref 0–0.1)
BASOPHILS NFR BLD AUTO: 1 % (ref 0–1)
BILIRUB SERPL-MCNC: 0.31 MG/DL (ref 0.2–1)
BUN SERPL-MCNC: 21 MG/DL (ref 5–25)
CALCIUM SERPL-MCNC: 8.9 MG/DL (ref 8.3–10.1)
CHLORIDE SERPL-SCNC: 108 MMOL/L (ref 100–108)
CO2 SERPL-SCNC: 29 MMOL/L (ref 21–32)
CREAT SERPL-MCNC: 0.9 MG/DL (ref 0.6–1.3)
EOSINOPHIL # BLD AUTO: 0.23 THOUSAND/ΜL (ref 0–0.61)
EOSINOPHIL NFR BLD AUTO: 3 % (ref 0–6)
ERYTHROCYTE [DISTWIDTH] IN BLOOD BY AUTOMATED COUNT: 12.3 % (ref 11.6–15.1)
GFR SERPL CREATININE-BSD FRML MDRD: 63 ML/MIN/1.73SQ M
GLUCOSE SERPL-MCNC: 93 MG/DL (ref 65–140)
HCT VFR BLD AUTO: 38.3 % (ref 34.8–46.1)
HGB BLD-MCNC: 12.8 G/DL (ref 11.5–15.4)
IMM GRANULOCYTES # BLD AUTO: 0.02 THOUSAND/UL (ref 0–0.2)
IMM GRANULOCYTES NFR BLD AUTO: 0 % (ref 0–2)
LYMPHOCYTES # BLD AUTO: 1.55 THOUSANDS/ΜL (ref 0.6–4.47)
LYMPHOCYTES NFR BLD AUTO: 18 % (ref 14–44)
MCH RBC QN AUTO: 33.3 PG (ref 26.8–34.3)
MCHC RBC AUTO-ENTMCNC: 33.4 G/DL (ref 31.4–37.4)
MCV RBC AUTO: 100 FL (ref 82–98)
MONOCYTES # BLD AUTO: 0.69 THOUSAND/ΜL (ref 0.17–1.22)
MONOCYTES NFR BLD AUTO: 8 % (ref 4–12)
NEUTROPHILS # BLD AUTO: 5.89 THOUSANDS/ΜL (ref 1.85–7.62)
NEUTS SEG NFR BLD AUTO: 70 % (ref 43–75)
NRBC BLD AUTO-RTO: 0 /100 WBCS
PLATELET # BLD AUTO: 213 THOUSANDS/UL (ref 149–390)
PMV BLD AUTO: 10.3 FL (ref 8.9–12.7)
POTASSIUM SERPL-SCNC: 4.2 MMOL/L (ref 3.5–5.3)
PROT SERPL-MCNC: 8 G/DL (ref 6.4–8.2)
RBC # BLD AUTO: 3.84 MILLION/UL (ref 3.81–5.12)
SODIUM SERPL-SCNC: 142 MMOL/L (ref 136–145)
WBC # BLD AUTO: 8.45 THOUSAND/UL (ref 4.31–10.16)

## 2019-05-09 PROCEDURE — 99215 OFFICE O/P EST HI 40 MIN: CPT | Performed by: INTERNAL MEDICINE

## 2019-05-09 PROCEDURE — 85025 COMPLETE CBC W/AUTO DIFF WBC: CPT

## 2019-05-09 PROCEDURE — 85652 RBC SED RATE AUTOMATED: CPT

## 2019-05-09 PROCEDURE — 80053 COMPREHEN METABOLIC PANEL: CPT

## 2019-05-09 PROCEDURE — 1101F PT FALLS ASSESS-DOCD LE1/YR: CPT | Performed by: INTERNAL MEDICINE

## 2019-05-09 PROCEDURE — 36415 COLL VENOUS BLD VENIPUNCTURE: CPT

## 2019-05-09 PROCEDURE — 84439 ASSAY OF FREE THYROXINE: CPT

## 2019-05-09 PROCEDURE — 84443 ASSAY THYROID STIM HORMONE: CPT

## 2019-05-09 RX ORDER — MELOXICAM 15 MG/1
15 TABLET ORAL DAILY
Qty: 30 TABLET | Refills: 1 | Status: SHIPPED | OUTPATIENT
Start: 2019-05-09 | End: 2019-07-02 | Stop reason: SDUPTHER

## 2019-05-10 ENCOUNTER — TELEPHONE (OUTPATIENT)
Dept: INTERNAL MEDICINE CLINIC | Facility: CLINIC | Age: 74
End: 2019-05-10

## 2019-05-10 ENCOUNTER — APPOINTMENT (OUTPATIENT)
Dept: LAB | Facility: CLINIC | Age: 74
End: 2019-05-10
Payer: COMMERCIAL

## 2019-05-10 DIAGNOSIS — E03.9 ACQUIRED HYPOTHYROIDISM: ICD-10-CM

## 2019-05-10 DIAGNOSIS — R19.7 DIARRHEA, UNSPECIFIED TYPE: ICD-10-CM

## 2019-05-10 LAB
ERYTHROCYTE [SEDIMENTATION RATE] IN BLOOD: 13 MM/HOUR (ref 0–20)
T4 FREE SERPL-MCNC: 1.37 NG/DL (ref 0.76–1.46)
TSH SERPL DL<=0.05 MIU/L-ACNC: 0.14 UIU/ML (ref 0.36–3.74)

## 2019-05-10 PROCEDURE — 87505 NFCT AGENT DETECTION GI: CPT

## 2019-05-10 PROCEDURE — 89055 LEUKOCYTE ASSESSMENT FECAL: CPT

## 2019-05-10 PROCEDURE — 82705 FATS/LIPIDS FECES QUAL: CPT

## 2019-05-10 PROCEDURE — 87209 SMEAR COMPLEX STAIN: CPT

## 2019-05-10 PROCEDURE — 87177 OVA AND PARASITES SMEARS: CPT

## 2019-05-10 RX ORDER — LEVOTHYROXINE SODIUM 0.1 MG/1
TABLET ORAL
Qty: 90 TABLET | Refills: 3
Start: 2019-05-10 | End: 2020-06-25

## 2019-05-11 LAB
C DIFF TOX GENS STL QL NAA+PROBE: NORMAL
CAMPYLOBACTER DNA SPEC NAA+PROBE: NORMAL
SALMONELLA DNA SPEC QL NAA+PROBE: NORMAL
SHIGA TOXIN STX GENE SPEC NAA+PROBE: NORMAL
SHIGELLA DNA SPEC QL NAA+PROBE: NORMAL

## 2019-05-13 LAB
G LAMBLIA AG STL QL IA: NEGATIVE
O+P STL CONC: NORMAL
WBC SPEC QL GRAM STN: NORMAL

## 2019-05-14 ENCOUNTER — TELEPHONE (OUTPATIENT)
Dept: INTERNAL MEDICINE CLINIC | Facility: CLINIC | Age: 74
End: 2019-05-14

## 2019-05-14 LAB
FAT STL QL: NORMAL
NEUTRAL FAT STL QL: NORMAL

## 2019-06-07 ENCOUNTER — HOSPITAL ENCOUNTER (OUTPATIENT)
Dept: RADIOLOGY | Facility: HOSPITAL | Age: 74
Discharge: HOME | End: 2019-06-07
Attending: INTERNAL MEDICINE
Payer: COMMERCIAL

## 2019-06-07 ENCOUNTER — OFFICE VISIT (OUTPATIENT)
Dept: INTERNAL MEDICINE CLINIC | Facility: CLINIC | Age: 74
End: 2019-06-07
Payer: COMMERCIAL

## 2019-06-07 VITALS
DIASTOLIC BLOOD PRESSURE: 70 MMHG | BODY MASS INDEX: 28.44 KG/M2 | WEIGHT: 166.6 LBS | HEIGHT: 64 IN | RESPIRATION RATE: 12 BRPM | SYSTOLIC BLOOD PRESSURE: 124 MMHG

## 2019-06-07 DIAGNOSIS — M25.552 PAIN OF BOTH HIP JOINTS: ICD-10-CM

## 2019-06-07 DIAGNOSIS — M25.551 PAIN OF BOTH HIP JOINTS: ICD-10-CM

## 2019-06-07 DIAGNOSIS — B00.9 HERPES SIMPLEX INFECTION: ICD-10-CM

## 2019-06-07 DIAGNOSIS — M54.16 LUMBAR RADICULOPATHY: Primary | ICD-10-CM

## 2019-06-07 PROCEDURE — 99213 OFFICE O/P EST LOW 20 MIN: CPT | Performed by: INTERNAL MEDICINE

## 2019-06-07 PROCEDURE — 1160F RVW MEDS BY RX/DR IN RCRD: CPT | Performed by: INTERNAL MEDICINE

## 2019-06-07 PROCEDURE — 73522 X-RAY EXAM HIPS BI 3-4 VIEWS: CPT

## 2019-06-07 PROCEDURE — 3008F BODY MASS INDEX DOCD: CPT | Performed by: INTERNAL MEDICINE

## 2019-06-07 RX ORDER — GABAPENTIN 300 MG/1
CAPSULE ORAL
Qty: 90 CAPSULE | Refills: 3 | Status: SHIPPED | OUTPATIENT
Start: 2019-06-07 | End: 2019-08-19

## 2019-06-07 RX ORDER — VALACYCLOVIR HYDROCHLORIDE 1 G/1
1000 TABLET, FILM COATED ORAL AS NEEDED
Qty: 14 TABLET | Refills: 0 | Status: SHIPPED | OUTPATIENT
Start: 2019-06-07 | End: 2020-08-12 | Stop reason: SDUPTHER

## 2019-06-07 RX ORDER — ESTRADIOL 0.1 MG/G
CREAM VAGINAL
Refills: 3 | COMMUNITY
Start: 2019-05-15 | End: 2019-08-19

## 2019-06-11 ENCOUNTER — OFFICE VISIT (OUTPATIENT)
Dept: GASTROENTEROLOGY | Facility: CLINIC | Age: 74
End: 2019-06-11
Payer: COMMERCIAL

## 2019-06-11 VITALS
RESPIRATION RATE: 18 BRPM | HEART RATE: 74 BPM | BODY MASS INDEX: 28.51 KG/M2 | WEIGHT: 167 LBS | DIASTOLIC BLOOD PRESSURE: 62 MMHG | SYSTOLIC BLOOD PRESSURE: 120 MMHG | HEIGHT: 64 IN

## 2019-06-11 DIAGNOSIS — R19.7 DIARRHEA, UNSPECIFIED TYPE: ICD-10-CM

## 2019-06-11 DIAGNOSIS — R19.4 CHANGE IN BOWEL HABITS: Primary | ICD-10-CM

## 2019-06-11 PROCEDURE — 99204 OFFICE O/P NEW MOD 45 MIN: CPT | Performed by: INTERNAL MEDICINE

## 2019-06-17 ENCOUNTER — ANESTHESIA EVENT (OUTPATIENT)
Dept: GASTROENTEROLOGY | Facility: HOSPITAL | Age: 74
End: 2019-06-17

## 2019-06-18 ENCOUNTER — HOSPITAL ENCOUNTER (OUTPATIENT)
Dept: GASTROENTEROLOGY | Facility: HOSPITAL | Age: 74
Setting detail: OUTPATIENT SURGERY
Discharge: HOME | End: 2019-06-18
Attending: INTERNAL MEDICINE
Payer: COMMERCIAL

## 2019-06-18 ENCOUNTER — ANESTHESIA (OUTPATIENT)
Dept: GASTROENTEROLOGY | Facility: HOSPITAL | Age: 74
End: 2019-06-18

## 2019-06-18 VITALS
SYSTOLIC BLOOD PRESSURE: 118 MMHG | BODY MASS INDEX: 27.96 KG/M2 | WEIGHT: 163.8 LBS | RESPIRATION RATE: 15 BRPM | HEIGHT: 64 IN | TEMPERATURE: 97.7 F | DIASTOLIC BLOOD PRESSURE: 57 MMHG | HEART RATE: 68 BPM | OXYGEN SATURATION: 96 %

## 2019-06-18 DIAGNOSIS — R19.7 DIARRHEA, UNSPECIFIED TYPE: ICD-10-CM

## 2019-06-18 DIAGNOSIS — R19.4 CHANGE IN BOWEL HABITS: ICD-10-CM

## 2019-06-18 LAB — GLUCOSE SERPL-MCNC: 115 MG/DL (ref 65–140)

## 2019-06-18 PROCEDURE — 45385 COLONOSCOPY W/LESION REMOVAL: CPT | Performed by: INTERNAL MEDICINE

## 2019-06-18 PROCEDURE — 88305 TISSUE EXAM BY PATHOLOGIST: CPT | Performed by: PATHOLOGY

## 2019-06-18 PROCEDURE — 82948 REAGENT STRIP/BLOOD GLUCOSE: CPT

## 2019-06-18 RX ORDER — PROPOFOL 10 MG/ML
INJECTION, EMULSION INTRAVENOUS AS NEEDED
Status: DISCONTINUED | OUTPATIENT
Start: 2019-06-18 | End: 2019-06-18 | Stop reason: SURG

## 2019-06-18 RX ORDER — SODIUM CHLORIDE, SODIUM LACTATE, POTASSIUM CHLORIDE, CALCIUM CHLORIDE 600; 310; 30; 20 MG/100ML; MG/100ML; MG/100ML; MG/100ML
125 INJECTION, SOLUTION INTRAVENOUS CONTINUOUS
Status: DISCONTINUED | OUTPATIENT
Start: 2019-06-18 | End: 2019-06-22 | Stop reason: HOSPADM

## 2019-06-18 RX ORDER — LIDOCAINE HYDROCHLORIDE 10 MG/ML
INJECTION, SOLUTION INFILTRATION; PERINEURAL AS NEEDED
Status: DISCONTINUED | OUTPATIENT
Start: 2019-06-18 | End: 2019-06-18 | Stop reason: SURG

## 2019-06-18 RX ADMIN — PROPOFOL 30 MG: 10 INJECTION, EMULSION INTRAVENOUS at 07:44

## 2019-06-18 RX ADMIN — PROPOFOL 20 MG: 10 INJECTION, EMULSION INTRAVENOUS at 07:56

## 2019-06-18 RX ADMIN — PROPOFOL 110 MG: 10 INJECTION, EMULSION INTRAVENOUS at 07:42

## 2019-06-18 RX ADMIN — LIDOCAINE HYDROCHLORIDE 50 MG: 10 INJECTION, SOLUTION INFILTRATION; PERINEURAL at 07:42

## 2019-06-18 RX ADMIN — PROPOFOL 30 MG: 10 INJECTION, EMULSION INTRAVENOUS at 07:46

## 2019-06-18 RX ADMIN — PROPOFOL 30 MG: 10 INJECTION, EMULSION INTRAVENOUS at 07:50

## 2019-06-18 RX ADMIN — SODIUM CHLORIDE, SODIUM LACTATE, POTASSIUM CHLORIDE, AND CALCIUM CHLORIDE 125 ML/HR: .6; .31; .03; .02 INJECTION, SOLUTION INTRAVENOUS at 07:12

## 2019-06-25 ENCOUNTER — TELEPHONE (OUTPATIENT)
Dept: GASTROENTEROLOGY | Facility: CLINIC | Age: 74
End: 2019-06-25

## 2019-06-25 ENCOUNTER — OFFICE VISIT (OUTPATIENT)
Dept: LAB | Facility: HOSPITAL | Age: 74
End: 2019-06-25
Payer: COMMERCIAL

## 2019-06-25 ENCOUNTER — TRANSCRIBE ORDERS (OUTPATIENT)
Dept: ADMINISTRATIVE | Facility: HOSPITAL | Age: 74
End: 2019-06-25

## 2019-06-25 ENCOUNTER — APPOINTMENT (OUTPATIENT)
Dept: LAB | Facility: HOSPITAL | Age: 74
End: 2019-06-25
Payer: COMMERCIAL

## 2019-06-25 DIAGNOSIS — N32.81 DETRUSOR INSTABILITY OF BLADDER: Primary | ICD-10-CM

## 2019-06-25 DIAGNOSIS — N32.81 DETRUSOR INSTABILITY OF BLADDER: ICD-10-CM

## 2019-06-25 LAB
ANION GAP SERPL CALCULATED.3IONS-SCNC: 8 MMOL/L (ref 4–13)
ATRIAL RATE: 63 BPM
BASOPHILS # BLD AUTO: 0.06 THOUSANDS/ΜL (ref 0–0.1)
BASOPHILS NFR BLD AUTO: 1 % (ref 0–1)
BUN SERPL-MCNC: 24 MG/DL (ref 5–25)
CALCIUM SERPL-MCNC: 8.8 MG/DL (ref 8.3–10.1)
CHLORIDE SERPL-SCNC: 107 MMOL/L (ref 100–108)
CO2 SERPL-SCNC: 28 MMOL/L (ref 21–32)
CREAT SERPL-MCNC: 0.89 MG/DL (ref 0.6–1.3)
EOSINOPHIL # BLD AUTO: 0.23 THOUSAND/ΜL (ref 0–0.61)
EOSINOPHIL NFR BLD AUTO: 4 % (ref 0–6)
ERYTHROCYTE [DISTWIDTH] IN BLOOD BY AUTOMATED COUNT: 12.4 % (ref 11.6–15.1)
GFR SERPL CREATININE-BSD FRML MDRD: 64 ML/MIN/1.73SQ M
GLUCOSE P FAST SERPL-MCNC: 115 MG/DL (ref 65–99)
HCT VFR BLD AUTO: 37.1 % (ref 34.8–46.1)
HGB BLD-MCNC: 12.3 G/DL (ref 11.5–15.4)
IMM GRANULOCYTES # BLD AUTO: 0.03 THOUSAND/UL (ref 0–0.2)
IMM GRANULOCYTES NFR BLD AUTO: 1 % (ref 0–2)
LYMPHOCYTES # BLD AUTO: 1.97 THOUSANDS/ΜL (ref 0.6–4.47)
LYMPHOCYTES NFR BLD AUTO: 32 % (ref 14–44)
MCH RBC QN AUTO: 32.5 PG (ref 26.8–34.3)
MCHC RBC AUTO-ENTMCNC: 33.2 G/DL (ref 31.4–37.4)
MCV RBC AUTO: 98 FL (ref 82–98)
MONOCYTES # BLD AUTO: 0.46 THOUSAND/ΜL (ref 0.17–1.22)
MONOCYTES NFR BLD AUTO: 8 % (ref 4–12)
NEUTROPHILS # BLD AUTO: 3.42 THOUSANDS/ΜL (ref 1.85–7.62)
NEUTS SEG NFR BLD AUTO: 54 % (ref 43–75)
NRBC BLD AUTO-RTO: 0 /100 WBCS
P AXIS: 62 DEGREES
PLATELET # BLD AUTO: 241 THOUSANDS/UL (ref 149–390)
PMV BLD AUTO: 9.8 FL (ref 8.9–12.7)
POTASSIUM SERPL-SCNC: 4.3 MMOL/L (ref 3.5–5.3)
PR INTERVAL: 148 MS
QRS AXIS: -45 DEGREES
QRSD INTERVAL: 86 MS
QT INTERVAL: 404 MS
QTC INTERVAL: 413 MS
RBC # BLD AUTO: 3.79 MILLION/UL (ref 3.81–5.12)
SODIUM SERPL-SCNC: 143 MMOL/L (ref 136–145)
T WAVE AXIS: 49 DEGREES
VENTRICULAR RATE: 63 BPM
WBC # BLD AUTO: 6.17 THOUSAND/UL (ref 4.31–10.16)

## 2019-06-25 PROCEDURE — 93005 ELECTROCARDIOGRAM TRACING: CPT

## 2019-06-25 PROCEDURE — 93010 ELECTROCARDIOGRAM REPORT: CPT | Performed by: INTERNAL MEDICINE

## 2019-06-25 PROCEDURE — 85025 COMPLETE CBC W/AUTO DIFF WBC: CPT

## 2019-06-25 PROCEDURE — 80048 BASIC METABOLIC PNL TOTAL CA: CPT

## 2019-06-25 PROCEDURE — 36415 COLL VENOUS BLD VENIPUNCTURE: CPT

## 2019-06-27 DIAGNOSIS — I10 ESSENTIAL HYPERTENSION: ICD-10-CM

## 2019-06-27 DIAGNOSIS — E03.9 ACQUIRED HYPOTHYROIDISM: ICD-10-CM

## 2019-06-27 RX ORDER — LEVOTHYROXINE SODIUM 0.1 MG/1
TABLET ORAL
Qty: 90 TABLET | Refills: 3 | Status: SHIPPED | OUTPATIENT
Start: 2019-06-27 | End: 2019-07-22

## 2019-06-27 RX ORDER — AMLODIPINE BESYLATE AND BENAZEPRIL HYDROCHLORIDE 5; 10 MG/1; MG/1
CAPSULE ORAL
Qty: 90 CAPSULE | Refills: 3 | Status: SHIPPED | OUTPATIENT
Start: 2019-06-27 | End: 2020-06-24

## 2019-07-01 ENCOUNTER — CONSULT (OUTPATIENT)
Dept: INTERNAL MEDICINE CLINIC | Facility: CLINIC | Age: 74
End: 2019-07-01
Payer: COMMERCIAL

## 2019-07-01 VITALS
WEIGHT: 168 LBS | SYSTOLIC BLOOD PRESSURE: 120 MMHG | BODY MASS INDEX: 28.68 KG/M2 | HEIGHT: 64 IN | HEART RATE: 68 BPM | DIASTOLIC BLOOD PRESSURE: 62 MMHG

## 2019-07-01 DIAGNOSIS — I10 ESSENTIAL HYPERTENSION: ICD-10-CM

## 2019-07-01 DIAGNOSIS — E03.9 ACQUIRED HYPOTHYROIDISM: ICD-10-CM

## 2019-07-01 DIAGNOSIS — E11.9 TYPE 2 DIABETES MELLITUS WITHOUT COMPLICATION, WITHOUT LONG-TERM CURRENT USE OF INSULIN (HCC): ICD-10-CM

## 2019-07-01 DIAGNOSIS — Z01.818 PREOP EXAMINATION: Primary | ICD-10-CM

## 2019-07-01 PROCEDURE — 99215 OFFICE O/P EST HI 40 MIN: CPT | Performed by: NURSE PRACTITIONER

## 2019-07-01 NOTE — PATIENT INSTRUCTIONS
70-year-old female who presents for preoperative clearance for a uterine prolapse  She is scheduled to have surgery July [facility] with Dr Thania Naidu  EKG reviewed which was normal and no evidence of ischemic changes  Labs are stable  No evidence of angina or decompensated heart failure  No medical contraindications to proceed with surgery  She will make medication changes as noted below    Medications changes prior to surgery  Hold metformin the night before surgery and the days of surgery then restart 1-2 days after surgery but only 2 a day  NO MELOXICAM, ADVIL, IBUPROFEN, ALEVE  5 days prior to surgery   Tylenol is ok   Hold Aldactone the morning of surgery

## 2019-07-01 NOTE — PROGRESS NOTES
Assessment/Plan:    Patient Instructions   79-year-old female who presents for preoperative clearance for a uterine prolapse  She is scheduled to have surgery July 29 in [facility] with Dr Carola Novak  EKG reviewed which was normal and no evidence of ischemic changes  Labs are stable  No evidence of angina or decompensated heart failure  No medical contraindications to proceed with surgery  She will make medication changes as noted below    Medications changes prior to surgery  Hold metformin the night before surgery and the days of surgery then restart 1-2 days after surgery but only 2 a day  NO MELOXICAM, ADVIL, IBUPROFEN, ALEVE  5 days prior to surgery  Tylenol is ok   Hold Aldactone the morning of surgery         Diagnoses and all orders for this visit:    Preop examination    Essential hypertension    Type 2 diabetes mellitus without complication, without long-term current use of insulin (HCC)    Acquired hypothyroidism         Subjective:      Patient ID: Stacy Barnett is a 76 y o  female     Patient is here for preoperative clearance, she has a prolapsed uterus with some urinary changes she is having surgery July 29th in [facility] with Dr Carola Novak  no formal exercise she is active she walks up and down steps and states that sometimes she lists 50 pounds of deer feet or CT food no chest pain or shortness of breath she chronically has left lower extremity swelling    No fevers chills cough shortness of breath orthopnea PND        Current Outpatient Medications:     amLODIPine-benazepril (LOTREL 5-10) 5-10 MG per capsule, TAKE 1 CAPSULE EVERY DAY, Disp: 90 capsule, Rfl: 3    atorvastatin (LIPITOR) 20 mg tablet, TAKE 1 TABLET DAILY, Disp: 90 tablet, Rfl: 3    clotrimazole-betamethasone (LOTRISONE) 1-0 05 % cream, Apply topically 3 (three) times a day, Disp: , Rfl:     estradiol (ESTRACE) 0 1 mg/g vaginal cream, APPLY TWICE A WEEK 1/2GM VAGINALLY AT BEDTIME WEEKLY, Disp: , Rfl: 3    Fluocinonide 0 1 % CREA, Apply topically 2 (two) times a day, Disp: , Rfl:     gabapentin (NEURONTIN) 300 mg capsule, 1 p o  Q h s  x2 days, b i d  X2 days then t i d , Disp: 90 capsule, Rfl: 3    glucose blood (ONE TOUCH ULTRA TEST) test strip, 1 each by Other route daily Use as instructed, Disp: 100 each, Rfl: 2    levothyroxine 100 mcg tablet, 1 po daily 5 days per week, Disp: 90 tablet, Rfl: 3    levothyroxine 100 mcg tablet, TAKE 1 TABLET DAILY  , Disp: 90 tablet, Rfl: 3    meloxicam (MOBIC) 15 mg tablet, Take 1 tablet (15 mg total) by mouth daily, Disp: 30 tablet, Rfl: 1    metFORMIN (GLUCOPHAGE-XR) 500 mg 24 hr tablet, Take 2 tablets (1,000 mg total) by mouth 2 (two) times a day with meals, Disp: 120 tablet, Rfl: 0    Multiple Vitamins-Minerals (CENTRUM SILVER) CHEW, Chew 1 tablet daily, Disp: , Rfl:     ONE TOUCH LANCETS MISC, by Does not apply route daily Test daily, Disp: 100 each, Rfl: 2    spironolactone (ALDACTONE) 25 mg tablet, TAKE 1 TABLET DAILY, Disp: 90 tablet, Rfl: 3    valACYclovir (VALTREX) 1,000 mg tablet, Take 1 tablet (1,000 mg total) by mouth as needed (as needed) for up to 119 days, Disp: 14 tablet, Rfl: 0    Recent Results (from the past 1008 hour(s))   Fingerstick Glucose (POCT)    Collection Time: 06/18/19  7:10 AM   Result Value Ref Range    POC Glucose 115 65 - 140 mg/dl   Tissue Exam    Collection Time: 06/18/19  7:49 AM   Result Value Ref Range    Case Report       Surgical Pathology Report                         Case: [id number]                                   Authorizing Provider:  Turner Jarrett MD      Collected:           06/18/2019 0749              Ordering Location:      [facility]       Received:            06/18/2019 [facility] [address] [facility]                                                             Pathologist:           Avni Wilde MD                                                        Specimens:   A) - Terminal Ileum B) - Large Intestine, Right/Ascending Colon, Right/Ascending Colon                                  C) - Polyp, Colorectal, ascending colon                                                             D) - Large Intestine, Left/Descending Colon, Left/Descending Colon                                  E) - Polyp, Colorectal,  proximal descending colon                                         Final Diagnosis       A  Terminal ileum, biopsy:  -  Normal small bowel mucosa, negative for acute or chronic enteritis, dysplasia or carcinoma  B   Colon, ascending, biopsy:  -  Benign colonic mucosa  -  No thickened basement membranes or intraepithelial lymphocytosis to suggest microscopic colitis (i e  collagenous colitis or lymphocytic colitis, respectively)  -  No active or chronic colitis, dysplasia or carcinoma  C   Colon, ascending polyp, biopsy:  -  Portion of reactive colonic mucosa with mild nonspecific change with stromal hemorrhage, negative for dysplasia or carcinoma  D   Colon, descending, biopsy:  -  Benign colonic mucosa  -  No thickened basement membranes or intraepithelial lymphocytosis to suggest microscopic colitis (i e  collagenous colitis or lymphocytic colitis, respectively)  -  No active or chronic colitis, dysplasia or carcinoma  E   Colon, proximal descending polyp, biopsy:  -  Portion of tubular adenoma, negative for high-grade dysplasia  -  Separate portion of hyperplastic polyp  Note       The descending colon polyp is noted to be a single polyp based on colonoscopy review, however, two distinct separate fragments display features of one (1) tubular adenoma and one (1) hyperplastic polyp  One tissue fragment may potentially represent contamination from part C  No evidence of high-grade dysplasia or carcinoma is seen in the submitted tissues  Suggest clinical correlation         Additional Information All controls performed with the immunohistochemical stains reported above reacted appropriately  These tests were developed and their performance characteristics determined by [facility] or [facility]  They may not be cleared or approved by the [place]  Food and Drug Administration  The FDA has determined that such clearance or approval is not necessary  These tests are used for clinical purposes  They should not be regarded as investigational or for research  This laboratory has been approved by [place] [id number], designated as a high-complexity laboratory and is qualified to perform these tests  Synoptic Checklist         (COLON/RECTUM POLYP FORM-GI - All Specimens)             : Adenoma(s)      Gross Description       A  The specimen is received in formalin, labeled with the patient's name and hospital number, and is designated "terminal ileum biopsy rule out inflammatory bowel disease  The specimen consists of a single tan-pink soft tissue fragment measuring 0 4 cm in greatest dimension  The specimen is entirely submitted in a screened cassette  B  The specimen is received in formalin, labeled with the patient's name and hospital number, and is designated "ascending colon biopsy rule out microscopic colitis  The specimen consists of 2 tan-pink soft tissue fragments measuring 0 2 and 0 7 cm in greatest dimension  The specimen is entirely submitted in a screened cassette  C  The specimen is received in formalin, labeled with the patient's name and hospital number, and is designated "ascending colon polyp  The specimen consists of a single tan-red, flattened portion of tissue measuring 0 3 cm in greatest dimension  The specimen is entirely submitted in a screened cassette  D  The specimen is received in formalin, labeled with the patient's name and hospital number, and is designated "descending colon biopsy rule out microscopic colitis    The specimen consists of 2 tan-pink soft tissue fragments measuring 0 2 and 0 4 cm in greatest dimension  The specimen is entirely submitted in a screened cassette  E  The specimen is received in formalin, labeled with the patient's name and hospital number, and is designated "proximal descending colon polyp  The specimen consists of 4 tan-pink, focally rubbery, focally fragmented polypoid portions of tissue ranging from 0 2-0 7 cm in greatest dimension  The specimen is entirely submitted in a screened cassette  Note: The estimated total formalin fixation time based upon information provided by the submitting clinician and the standard processing schedule is under 72 hours                                                                                       [facility] bx  R/o inflammatory bowel disease    ECG 12 lead    Collection Time: 06/25/19 10:58 AM   Result Value Ref Range    Ventricular Rate 63 BPM    Atrial Rate 63 BPM    AR Interval 148 ms    QRSD Interval 86 ms    QT Interval 404 ms    QTC Interval 413 ms    P Axis 62 degrees    QRS Axis -45 degrees    T Wave Axis 49 degrees   CBC and differential    Collection Time: 06/25/19 12:01 PM   Result Value Ref Range    WBC 6 17 4 31 - 10 16 Thousand/uL    RBC 3 79 (L) 3 81 - 5 12 Million/uL    Hemoglobin 12 3 11 5 - 15 4 g/dL    Hematocrit 37 1 34 8 - 46 1 %    MCV 98 82 - 98 fL    MCH 32 5 26 8 - 34 3 pg    MCHC 33 2 31 4 - 37 4 g/dL    RDW 12 4 11 6 - 15 1 %    MPV 9 8 8 9 - 12 7 fL    Platelets 681 817 - 573 Thousands/uL    nRBC 0 /100 WBCs    Neutrophils Relative 54 43 - 75 %    Immat GRANS % 1 0 - 2 %    Lymphocytes Relative 32 14 - 44 %    Monocytes Relative 8 4 - 12 %    Eosinophils Relative 4 0 - 6 %    Basophils Relative 1 0 - 1 %    Neutrophils Absolute 3 42 1 85 - 7 62 Thousands/µL    Immature Grans Absolute 0 03 0 00 - 0 20 Thousand/uL    Lymphocytes Absolute 1 97 0 60 - 4 47 Thousands/µL    Monocytes Absolute 0 46 0 17 - 1 22 Thousand/µL Eosinophils Absolute 0 23 0 00 - 0 61 Thousand/µL    Basophils Absolute 0 06 0 00 - 0 10 Thousands/µL   Basic metabolic panel    Collection Time: 06/25/19 12:01 PM   Result Value Ref Range    Sodium 143 136 - 145 mmol/L    Potassium 4 3 3 5 - 5 3 mmol/L    Chloride 107 100 - 108 mmol/L    CO2 28 21 - 32 mmol/L    ANION GAP 8 4 - 13 mmol/L    BUN 24 5 - 25 mg/dL    Creatinine 0 89 0 60 - 1 30 mg/dL    Glucose, Fasting 115 (H) 65 - 99 mg/dL    Calcium 8 8 8 3 - 10 1 mg/dL    eGFR 64 ml/min/1 73sq m       The following portions of the patient's history were reviewed and updated as appropriate: allergies, current medications, past family history, past medical history, past social history, past surgical history and problem list      Review of Systems   Constitutional: Negative for appetite change, chills, diaphoresis, fatigue, fever and unexpected weight change  HENT: Negative for postnasal drip and sneezing  Eyes: Negative for visual disturbance  Respiratory: Negative for chest tightness and shortness of breath  Cardiovascular: Negative for chest pain, palpitations and leg swelling  Gastrointestinal: Negative for abdominal pain and blood in stool  Endocrine: Negative for cold intolerance, heat intolerance, polydipsia, polyphagia and polyuria  Genitourinary: Negative for difficulty urinating, dysuria, frequency and urgency  Musculoskeletal: Negative for arthralgias and myalgias  Skin: Negative for rash and wound  Neurological: Negative for dizziness, weakness, light-headedness and headaches  Hematological: Negative for adenopathy  Psychiatric/Behavioral: Negative for confusion, dysphoric mood and sleep disturbance  The patient is not nervous/anxious  Objective:      /62   Pulse 68   Ht 5' 4" (1 626 m)   Wt 76 2 kg (168 lb)   BMI 28 84 kg/m²        Physical Exam   Constitutional: She is oriented to person, place, and time  She appears well-developed  No distress     HENT: Head: Normocephalic and atraumatic  Nose: Nose normal    Mouth/Throat: Oropharynx is clear and moist    Eyes: Pupils are equal, round, and reactive to light  Conjunctivae and EOM are normal    Neck: Normal range of motion  Neck supple  No JVD present  No tracheal deviation present  No thyromegaly present  Cardiovascular: Normal rate, regular rhythm, normal heart sounds and intact distal pulses  Exam reveals no gallop and no friction rub  No murmur heard  Pulses:       Dorsalis pedis pulses are 2+ on the right side, and 2+ on the left side  Posterior tibial pulses are 2+ on the right side, and 2+ on the left side  Pulmonary/Chest: Effort normal and breath sounds normal  No respiratory distress  She has no wheezes  She has no rales  Abdominal: Soft  Bowel sounds are normal  She exhibits no distension  There is no tenderness  Musculoskeletal: Normal range of motion  She exhibits no edema  Feet:   Right Foot:   Skin Integrity: Positive for dry skin  Negative for ulcer, skin breakdown, erythema, warmth or callus  Left Foot:   Skin Integrity: Positive for dry skin  Negative for ulcer, skin breakdown, erythema, warmth or callus  Lymphadenopathy:     She has no cervical adenopathy  Neurological: She is alert and oriented to person, place, and time  No cranial nerve deficit  Skin: Skin is warm and dry  No rash noted  She is not diaphoretic  Psychiatric: She has a normal mood and affect  Her behavior is normal  Judgment and thought content normal    BMI Counseling: Body mass index is 28 84 kg/m²  Discussed the patient's BMI with her  The BMI is above average  BMI counseling and education was provided to the patient  Nutrition recommendations include reducing portion sizes  Diabetic Foot Exam    Patient's shoes and socks removed  Right Foot/Ankle   Right Foot Inspection  Skin Exam: skin normal, skin intact and dry skin no warmth, no callus, no erythema, no maceration, no abnormal color, no pre-ulcer, no ulcer and no callus                          Toe Exam: ROM and strength within normal limits  Sensory   Vibration: intact  Proprioception: intact   Monofilament testing: intact  Vascular  Capillary refills: < 3 seconds  The right DP pulse is 2+  The right PT pulse is 2+  Left Foot/Ankle  Left Foot Inspection  Skin Exam: skin normal, skin intact and dry skinno warmth, no erythema, no maceration, normal color, no pre-ulcer, no ulcer and no callus                         Toe Exam: ROM and strength within normal limits                   Sensory   Vibration: intact  Proprioception: intact  Monofilament: intact  Vascular  Capillary refills: < 3 seconds  The left DP pulse is 2+  The left PT pulse is 2+     Assign Risk Category:  No deformity present; ;        Risk: 0

## 2019-07-02 DIAGNOSIS — M54.16 LUMBAR RADICULOPATHY: ICD-10-CM

## 2019-07-02 RX ORDER — MELOXICAM 15 MG/1
TABLET ORAL
Qty: 30 TABLET | Refills: 1 | Status: SHIPPED | OUTPATIENT
Start: 2019-07-02 | End: 2019-08-19

## 2019-07-04 DIAGNOSIS — I10 ESSENTIAL HYPERTENSION: ICD-10-CM

## 2019-07-04 RX ORDER — SPIRONOLACTONE 25 MG/1
TABLET ORAL
Qty: 90 TABLET | Refills: 3 | Status: SHIPPED | OUTPATIENT
Start: 2019-07-04 | End: 2020-06-24

## 2019-07-22 NOTE — PRE-PROCEDURE INSTRUCTIONS
Pre-Surgery Instructions:   Medication Instructions    amLODIPine-benazepril (LOTREL 5-10) 5-10 MG per capsule Instructed patient per Anesthesia Guidelines   atorvastatin (LIPITOR) 20 mg tablet Instructed patient per Anesthesia Guidelines   clotrimazole-betamethasone (LOTRISONE) 1-0 05 % cream Instructed patient per Anesthesia Guidelines   estradiol (ESTRACE) 0 1 mg/g vaginal cream Instructed patient per Anesthesia Guidelines   gabapentin (NEURONTIN) 300 mg capsule Instructed patient per Anesthesia Guidelines   levothyroxine 100 mcg tablet Instructed patient per Anesthesia Guidelines   meloxicam (MOBIC) 15 mg tablet Instructed patient per Anesthesia Guidelines   metFORMIN (GLUCOPHAGE-XR) 500 mg 24 hr tablet Instructed patient per Anesthesia Guidelines   Multiple Vitamins-Minerals (CENTRUM SILVER) CHEW Instructed patient per Anesthesia Guidelines   spironolactone (ALDACTONE) 25 mg tablet Instructed patient per Anesthesia Guidelines   valACYclovir (VALTREX) 1,000 mg tablet Instructed patient per Anesthesia Guidelines  Patient instructed to take Levothyroxine with sip of water the morning of surgery per anesthesia guidelines  No aspirin, NSAIDs, vitamins, or supplements 1 week before surgery

## 2019-07-26 ENCOUNTER — ANESTHESIA EVENT (OUTPATIENT)
Dept: PERIOP | Facility: HOSPITAL | Age: 74
End: 2019-07-26
Payer: COMMERCIAL

## 2019-07-29 ENCOUNTER — ANESTHESIA (OUTPATIENT)
Dept: PERIOP | Facility: HOSPITAL | Age: 74
End: 2019-07-29
Payer: COMMERCIAL

## 2019-07-29 ENCOUNTER — HOSPITAL ENCOUNTER (OUTPATIENT)
Facility: HOSPITAL | Age: 74
Setting detail: OUTPATIENT SURGERY
Discharge: HOME | End: 2019-07-30
Attending: OBSTETRICS & GYNECOLOGY | Admitting: OBSTETRICS & GYNECOLOGY
Payer: COMMERCIAL

## 2019-07-29 DIAGNOSIS — N81.4 CYSTOCELE WITH UTERINE PROLAPSE: Primary | ICD-10-CM

## 2019-07-29 LAB
GLUCOSE SERPL-MCNC: 105 MG/DL (ref 65–140)
GLUCOSE SERPL-MCNC: 132 MG/DL (ref 65–140)
GLUCOSE SERPL-MCNC: 91 MG/DL (ref 65–140)
GLUCOSE SERPL-MCNC: 97 MG/DL (ref 65–140)

## 2019-07-29 PROCEDURE — C1781 MESH (IMPLANTABLE): HCPCS | Performed by: OBSTETRICS & GYNECOLOGY

## 2019-07-29 PROCEDURE — C2631 REP DEV, URINARY, W/O SLING: HCPCS | Performed by: OBSTETRICS & GYNECOLOGY

## 2019-07-29 PROCEDURE — C1771 REP DEV, URINARY, W/SLING: HCPCS | Performed by: OBSTETRICS & GYNECOLOGY

## 2019-07-29 PROCEDURE — 82948 REAGENT STRIP/BLOOD GLUCOSE: CPT

## 2019-07-29 PROCEDURE — C1762 CONN TISS, HUMAN(INC FASCIA): HCPCS | Performed by: OBSTETRICS & GYNECOLOGY

## 2019-07-29 DEVICE — SINGLE INCISION SLING SYSTEM
Type: IMPLANTABLE DEVICE | Status: FUNCTIONAL
Brand: ALTIS

## 2019-07-29 DEVICE — IMPLANT AXIS DERMIS TUTOPLAST 6 X 8 CM: Type: IMPLANTABLE DEVICE | Status: FUNCTIONAL

## 2019-07-29 DEVICE — SYSTEM ANCHORSURE F/PELVIC FLOOR: Type: IMPLANTABLE DEVICE | Status: FUNCTIONAL

## 2019-07-29 RX ORDER — SODIUM CHLORIDE 9 MG/ML
125 INJECTION, SOLUTION INTRAVENOUS CONTINUOUS
Status: DISCONTINUED | OUTPATIENT
Start: 2019-07-29 | End: 2019-07-30

## 2019-07-29 RX ORDER — ONDANSETRON 2 MG/ML
4 INJECTION INTRAMUSCULAR; INTRAVENOUS ONCE AS NEEDED
Status: DISCONTINUED | OUTPATIENT
Start: 2019-07-29 | End: 2019-07-29 | Stop reason: HOSPADM

## 2019-07-29 RX ORDER — FENTANYL CITRATE/PF 50 MCG/ML
50 SYRINGE (ML) INJECTION
Status: DISCONTINUED | OUTPATIENT
Start: 2019-07-29 | End: 2019-07-29 | Stop reason: HOSPADM

## 2019-07-29 RX ORDER — OXYCODONE HYDROCHLORIDE 5 MG/1
5 TABLET ORAL EVERY 4 HOURS PRN
Status: DISCONTINUED | OUTPATIENT
Start: 2019-07-29 | End: 2019-07-30 | Stop reason: HOSPADM

## 2019-07-29 RX ORDER — MIDAZOLAM HYDROCHLORIDE 1 MG/ML
INJECTION INTRAMUSCULAR; INTRAVENOUS
Status: COMPLETED | OUTPATIENT
Start: 2019-07-29 | End: 2019-07-29

## 2019-07-29 RX ORDER — LIDOCAINE HYDROCHLORIDE AND EPINEPHRINE 20; 5 MG/ML; UG/ML
INJECTION, SOLUTION EPIDURAL; INFILTRATION; INTRACAUDAL; PERINEURAL AS NEEDED
Status: DISCONTINUED | OUTPATIENT
Start: 2019-07-29 | End: 2019-07-29 | Stop reason: SURG

## 2019-07-29 RX ORDER — LIDOCAINE HYDROCHLORIDE AND EPINEPHRINE 15; 5 MG/ML; UG/ML
INJECTION, SOLUTION EPIDURAL
Status: COMPLETED | OUTPATIENT
Start: 2019-07-29 | End: 2019-07-29

## 2019-07-29 RX ORDER — PROPOFOL 10 MG/ML
INJECTION, EMULSION INTRAVENOUS CONTINUOUS PRN
Status: DISCONTINUED | OUTPATIENT
Start: 2019-07-29 | End: 2019-07-29 | Stop reason: SURG

## 2019-07-29 RX ORDER — FENTANYL CITRATE 50 UG/ML
INJECTION, SOLUTION INTRAMUSCULAR; INTRAVENOUS
Status: COMPLETED | OUTPATIENT
Start: 2019-07-29 | End: 2019-07-29

## 2019-07-29 RX ORDER — MEPERIDINE HYDROCHLORIDE 50 MG/ML
12.5 INJECTION INTRAMUSCULAR; INTRAVENOUS; SUBCUTANEOUS ONCE AS NEEDED
Status: DISCONTINUED | OUTPATIENT
Start: 2019-07-29 | End: 2019-07-29 | Stop reason: HOSPADM

## 2019-07-29 RX ORDER — IBUPROFEN 600 MG/1
600 TABLET ORAL EVERY 6 HOURS SCHEDULED
Status: DISCONTINUED | OUTPATIENT
Start: 2019-07-29 | End: 2019-07-30 | Stop reason: HOSPADM

## 2019-07-29 RX ORDER — ACETAMINOPHEN 325 MG/1
650 TABLET ORAL EVERY 6 HOURS SCHEDULED
Status: DISCONTINUED | OUTPATIENT
Start: 2019-07-29 | End: 2019-07-30 | Stop reason: HOSPADM

## 2019-07-29 RX ORDER — SODIUM CHLORIDE 9 MG/ML
75 INJECTION, SOLUTION INTRAVENOUS CONTINUOUS
Status: DISCONTINUED | OUTPATIENT
Start: 2019-07-29 | End: 2019-07-30

## 2019-07-29 RX ORDER — FUROSEMIDE 10 MG/ML
INJECTION INTRAMUSCULAR; INTRAVENOUS AS NEEDED
Status: DISCONTINUED | OUTPATIENT
Start: 2019-07-29 | End: 2019-07-29 | Stop reason: SURG

## 2019-07-29 RX ORDER — LEVOTHYROXINE SODIUM 0.1 MG/1
100 TABLET ORAL
Status: DISCONTINUED | OUTPATIENT
Start: 2019-07-30 | End: 2019-07-30 | Stop reason: HOSPADM

## 2019-07-29 RX ORDER — HYDROMORPHONE HCL/PF 1 MG/ML
0.5 SYRINGE (ML) INJECTION
Status: DISCONTINUED | OUTPATIENT
Start: 2019-07-29 | End: 2019-07-29 | Stop reason: HOSPADM

## 2019-07-29 RX ORDER — GENTAMICIN SULFATE 80 MG/50ML
80 INJECTION, SOLUTION INTRAVENOUS ONCE
Status: DISCONTINUED | OUTPATIENT
Start: 2019-07-29 | End: 2019-07-29 | Stop reason: HOSPADM

## 2019-07-29 RX ORDER — MAGNESIUM HYDROXIDE 1200 MG/15ML
LIQUID ORAL AS NEEDED
Status: DISCONTINUED | OUTPATIENT
Start: 2019-07-29 | End: 2019-07-29 | Stop reason: HOSPADM

## 2019-07-29 RX ORDER — CEFAZOLIN SODIUM 1 G/3ML
INJECTION, POWDER, FOR SOLUTION INTRAMUSCULAR; INTRAVENOUS AS NEEDED
Status: DISCONTINUED | OUTPATIENT
Start: 2019-07-29 | End: 2019-07-29 | Stop reason: SURG

## 2019-07-29 RX ORDER — CLINDAMYCIN PHOSPHATE 900 MG/50ML
900 INJECTION INTRAVENOUS ONCE
Status: DISCONTINUED | OUTPATIENT
Start: 2019-07-29 | End: 2019-07-29 | Stop reason: HOSPADM

## 2019-07-29 RX ADMIN — LIDOCAINE HYDROCHLORIDE AND EPINEPHRINE 5 ML: 20; 5 INJECTION, SOLUTION EPIDURAL; INFILTRATION; INTRACAUDAL; PERINEURAL at 10:08

## 2019-07-29 RX ADMIN — FENTANYL CITRATE 100 MCG: 50 INJECTION, SOLUTION INTRAMUSCULAR; INTRAVENOUS at 09:59

## 2019-07-29 RX ADMIN — SODIUM CHLORIDE 125 ML/HR: 0.9 INJECTION, SOLUTION INTRAVENOUS at 08:56

## 2019-07-29 RX ADMIN — PROPOFOL 100 MCG/KG/MIN: 10 INJECTION, EMULSION INTRAVENOUS at 11:20

## 2019-07-29 RX ADMIN — IBUPROFEN 600 MG: 600 TABLET ORAL at 18:26

## 2019-07-29 RX ADMIN — FENTANYL CITRATE 50 MCG: 50 INJECTION, SOLUTION INTRAMUSCULAR; INTRAVENOUS at 13:39

## 2019-07-29 RX ADMIN — IBUPROFEN 600 MG: 600 TABLET ORAL at 23:46

## 2019-07-29 RX ADMIN — FENTANYL CITRATE 50 MCG: 50 INJECTION, SOLUTION INTRAMUSCULAR; INTRAVENOUS at 13:45

## 2019-07-29 RX ADMIN — SODIUM CHLORIDE 75 ML/HR: 0.9 INJECTION, SOLUTION INTRAVENOUS at 14:10

## 2019-07-29 RX ADMIN — MIDAZOLAM 2 MG: 1 INJECTION INTRAMUSCULAR; INTRAVENOUS at 09:59

## 2019-07-29 RX ADMIN — LIDOCAINE HYDROCHLORIDE AND EPINEPHRINE 5 ML: 20; 5 INJECTION, SOLUTION EPIDURAL; INFILTRATION; INTRACAUDAL; PERINEURAL at 11:25

## 2019-07-29 RX ADMIN — LIDOCAINE HYDROCHLORIDE AND EPINEPHRINE 3 ML: 15; 5 INJECTION, SOLUTION EPIDURAL at 09:59

## 2019-07-29 RX ADMIN — ACETAMINOPHEN 650 MG: 325 TABLET ORAL at 18:26

## 2019-07-29 RX ADMIN — FUROSEMIDE 10 MG: 10 INJECTION, SOLUTION INTRAMUSCULAR; INTRAVENOUS at 12:57

## 2019-07-29 RX ADMIN — SODIUM CHLORIDE 125 ML/HR: 0.9 INJECTION, SOLUTION INTRAVENOUS at 10:33

## 2019-07-29 RX ADMIN — SODIUM CHLORIDE, SODIUM LACTATE, POTASSIUM CHLORIDE, AND CALCIUM CHLORIDE 1000 ML: .6; .31; .03; .02 INJECTION, SOLUTION INTRAVENOUS at 16:30

## 2019-07-29 RX ADMIN — ACETAMINOPHEN 650 MG: 325 TABLET ORAL at 23:46

## 2019-07-29 RX ADMIN — OXYCODONE HYDROCHLORIDE 5 MG: 5 TABLET ORAL at 16:50

## 2019-07-29 RX ADMIN — CEFAZOLIN SODIUM 2000 MG: 1 INJECTION, POWDER, FOR SOLUTION INTRAMUSCULAR; INTRAVENOUS at 11:20

## 2019-07-29 RX ADMIN — LIDOCAINE HYDROCHLORIDE AND EPINEPHRINE 5 ML: 20; 5 INJECTION, SOLUTION EPIDURAL; INFILTRATION; INTRACAUDAL; PERINEURAL at 11:17

## 2019-07-29 NOTE — OP NOTE
OPERATIVE REPORT  PATIENT NAME: Evangelista Martínez    :  1945  MRN: [id number]  Pt Location: [facility] OR ROOM 04    SURGERY DATE: 2019    Surgeon(s) and Role: * Heath Castrejon MD - Primary     * Aldo Arechiga DO - Resident, Present     * Mackenzie Nelson MD - Fellow, Assisting    Preop Diagnosis:  Incomplete uterovaginal prolapse [N81 2]  Cystocele, midline [N81 11]  Incompetence or weakening of pubocervical tissue [N81 82]  Pelvic muscle wasting [N81 84]  Other female genital prolapse [N81 89]  Stress incontinence [N39 3]  Hypermobility of urethra [N36 41]    Post-Op Diagnosis Codes:     * Incomplete uterovaginal prolapse [N81 2]     * Cystocele, midline [N81 11]     * Incompetence or weakening of pubocervical tissue [N81 82]     * Pelvic muscle wasting [N81 84]     * Other female genital prolapse [N81 89]     * Stress incontinence [N39 3]     * Hypermobility of urethra [N36 41]    Procedure(s) (LRB):  VE COLPOPEXY (N/A)  A&P COLPORRHAPHY; GRAFT (N/A)  PUBOVAGINAL SLING (N/A)  CYSTOSCOPY (N/A)    Specimen(s):  * No specimens in log *    Estimated Blood Loss:   Minimal    Drains:  Urethral Catheter Non-latex 18 Fr  (Active)   Number of days: 0       Anesthesia Type:   Epidural    Operative Indications:  Incomplete uterovaginal prolapse [N81 2]  Cystocele, midline [N81 11]  Incompetence or weakening of pubocervical tissue [N81 82]  Pelvic muscle wasting [N81 84]  Other female genital prolapse [N81 89]  Stress incontinence [N39 3]  Hypermobility of urethra [N36 41]      Operative Findings:  1  Cystocele  2  Rectocele  3  Cystoscopy: efflux noted from bilateral ureteral orifices  No mesh, suture material, or injury noted to bladder lumen  Complications:   None    Procedure and Technique:    Appropriate preoperative antibiotics chosen per ACOG guidelines were given  Bilateral SCDs were placed in the lower extremities for DVT prevention prior to the institution of anesthesia      No bladder, ureteral, viscus, or solid organ injury were noted at the end of the procedure  The patient was identified in the holding area by the operating room staff and attending physician  She was taken to the operating room where anesthesia was instituted without complications  She was placed in the dorsal lithotomy position with the legs in [address] [place] [address] with care taken to avoid excessive flexion or extension of her lower extremities  The patient was prepped and draped in the usual sterile fashion  A Koo catheter was inserted  Attention was turned to the anterior vaginal extraperitoneal colpopexy and the anterior colporrhaphy  Two Allis clamps were applied vertically along the midline to grasp the dependent portion of the cystocele for traction  0 25% Marcaine with epinephrine diluted 1:1 with injectable saline was infiltrated into the true vesicovaginal space using a Tuohey epidural needle for hydrodissection  20ml were injected into the midline, 20ml injected into the left and 20ml injected into the right paravaginal spaces for a total of 60ml  Next, a midline anterior vaginal wall incision was made through the full thickness of the the vaginal epithelium, the muscularis and the pubocervical fascia to the vesicovaginal space  This incision was extended to the level of the urethrovesical junction distally and the cervix  proximally  Careful lateral dissection was carried out until the paravaginal and paravesical spaces were reached, allowing palpation of the obturator internus muscle, ischial spines, and arcus tendineus, and sacrospinous ligament  The bladder was drained, draining clear urine  Next, the Anchorsure trocar device loaded with a 2-0 Prolene suture was brought onto the field  It was inserted through the left paravaginal and paravesical space towards the level of the sacrospinous ligament   The device was used to place the 2-0 Prolene suture through the sacrospinous ligament approximately 1 5 cm medial to the ischial spine  Once the suture was anchored into place, the trocar was removed  The same procedure was repeated on the contralateral side  The Coloplast Suspend fascia radha 4x6cm biologic graft was brought onto the field and trimmed fit the dimensions of the anterior vaginal wall  The distal ends of the sutures on the sacrospinous ligaments were then anchored to the arms of the graft bilaterally with a free needle  Next, the apical portion of the graft was anchored to the cervix with the same sacrospinous sutures  The sutures were tied down bilaterally to create suture bridges until there was sufficient tension on the knots to complete the colpopexy and appropriate reduction of the prolapse was noted  The graft and paravaginal spaces were copiously irrigated with an antibiotic solution  The vaginal epithelium was closed using 2-0 Vicryl suture in a running locked fashion with care taken to incorporate a full-thickness closure  The incision was hemostatic  Next, we turned our attention to the single incision sling  The mid urethral zone was identified by reference to the Koo catheter and urethral meatus  Local anesthetic solution was injected into the anterior vaginal wall muscularis at the mid urethral level for hydrodissection and vasoconstriction, 10 mL was injected at the midline  Next, an additional 10 mL was injected to the left and right of midline directing the infiltration laterally towards the cephalad aspect of the inferior pubic ramus bilaterally  Care was taken to ensure that the anterolateral sulcus was flattened by the infiltration to minimize chance for buttonholing or sling (tape) becoming too close to the anterolateral sulcus  After completion of hydrodissection, 2 Allis clamps were used to grasp the anterior vaginal wall for traction  A 1 5 cm incision was made into the midline through mucosa and muscularis   Two Allis clamps were then placed on each cut edge of the incision for stabilization  Tenotomy scissors were used to create a small vaginal tunnel with sharp and blunt dissection above the anterior vaginal wall directed laterally towards the cephalad aspect of the inferior pubic ramus bilaterally  Dissection was carried out to the edge of the bone itself but without dissection into the obturator internus muscle  Once the dissection was complete, the Altis sling system was placed  An index finger was placed in the vagina for guidance  The Altis trocar and fixed anchor was placed into the pre-dissected tract  The handle was held horizontally in a slight upward angle to avoid buttonholing of the sulcus  Cephalad drift was used to allow passage around the inferior pubic ramus  A thumb was placed on the heel of the introducer to allow a lateral followed by a rotation push maneuver to place a fixed anchor into the obturator internus muscle membrane complex on the patient's left side  Proper handle deviation confirmed proper anchor placement, as well as inspection of the sling itself  Once the introducer was removed, proper anchor placement was confirmed by gentle tugging on the sling  The exact same sequence of steps was repeated on the patient's right side with the adjustable anchor and trocar  Once placement of dynamic anchor was completed, the introducer was removed and gentle tugging again confirmed proper anchor placement  The Koo catheter was then removed and a diagnostic cystoscopy was performed by instilling 300 mL of fluid into the bladder  Both ureters were effluxing normally and there were no lacerations or evidence of injury to the lower urinary tract  The tensioning suture was used to adjust the tape until there was minimal to no leakage with Crede and coughing  After appropriate tensioning, the tensioning suture was cut  The vaginal incision was closed with 2-0 Vicryl suture in a running locked stitch      Attention was then turned to the posterior compartment where 2 Allis clamps were placed in the posterior fourchette over the mucocutaneous border to reduce the markedly relaxed vaginal outlet  Dilute Marcaine solution was injected into the perineum  A rodrigeuz-shaped incision was made extending from the vaginal mucosa onto the perineum  The overlying skin was removed en bloc  We then began closure of the posterior colporrhaphy with a 2-0 Vicryl suture in a running locked stitch  We reapproximated the perineal body with a 0-Vicryl interrupted suture  We closed the remainder of the colporrhaphy to the level of the hymen  We closed the perineal skin with 2-0 Vicryl in a subcuticular fashion  The Koo catheter was reinserted  Vaginal packing was placed in the vagina  The sponge, needle and instrument count were correct x 2  The patient tolerated the procedure well  She was awakened from anesthesia and transferred to the recovery room in stable condition  A qualified resident physician was not available  Dr Filomena Espino was present for the entire procedure      Patient Disposition:  PACU     SIGNATURE: Nan Lares MD  DATE: July 29, 2019  TIME: 1:22 PM

## 2019-07-29 NOTE — PROGRESS NOTES
Progress Note - OB/GYN   Clive Madison 76 y o  female MRN: [id number]  Unit/Bed#: E2 -01 Encounter: [id number]    Assessment:  76 y o  POD#0 s/p s/p anterior vaginal extraperitoneal colpopexy, anterior and posterior colporrhaphy, pubovaginal sling, cystoscopy    Plan:  Vaginal/Rectal discomfort: improved after removal of 1 foot packing, continue to monitor closely   Continue routine postoperative care  Encourage ambulation  Pain control as needed  For voiding trial tomorrow    Disposition: anticipate discharge tomorrow    Subjective/Objective   Chief Complaint:     76 y o  POD#0 s/p s/p anterior vaginal extraperitoneal colpopexy, anterior and posterior colporrhaphy, pubovaginal sling, cystoscopy      Subjective:     Pain: noting discomfort in vaginal and rectum, rating discomfort 6/10, improved with motrin and oxycodone, also improved following removal of vaginal packing   Tolerating PO: ate dinner prior to encounter  Voidin cc charted postoperative, with 200 cc in stacy at time of encoutner  Flatus: yes  BM: soft bowel movement just prior to encounter  Ambulating: yes  Chest pain: no  Shortness of breath: no  Leg pain: no      Objective:     Vitals: Blood pressure 149/64, pulse 67, temperature 98 3 °F (36 8 °C), temperature source Temporal, resp  rate 18, height 5' 4" (1 626 m), weight 76 2 kg (168 lb), SpO2 97 %, not currently breastfeeding  Physical Exam:     Physical Exam   Constitutional: She is oriented to person, place, and time  She appears well-developed and well-nourished  No distress  Cardiovascular: Normal rate, regular rhythm, normal heart sounds and intact distal pulses  Pulmonary/Chest: Breath sounds normal  No stridor  No respiratory distress  Abdominal: Soft  Bowel sounds are normal  She exhibits no distension  There is no tenderness  Neurological: She is alert and oriented to person, place, and time  Skin: Skin is warm and dry  She is not diaphoretic     Psychiatric: She has a normal mood and affect  Her behavior is normal          Lab, Imaging and other studies: I have personally reviewed pertinent reports        Lab Results   Component Value Date    WBC 6 17 06/25/2019    HGB 12 3 06/25/2019    HCT 37 1 06/25/2019    MCV 98 06/25/2019     06/25/2019               Leah De La Cruz DO  07/29/19

## 2019-07-29 NOTE — ANESTHESIA POSTPROCEDURE EVALUATION
Post-Op Assessment Note    CV Status:  Stable    Pain management: adequate     Mental Status:  Alert and awake   Hydration Status:  Euvolemic   PONV Controlled:  Controlled   Airway Patency:  Patent   Post Op Vitals Reviewed: Yes        Post-op block assessment: catheter intact and no complications        BP      Temp      Pulse 66 (07/29/19 1355)   Resp 14 (07/29/19 1355)    SpO2 95 % (07/29/19 1355)

## 2019-07-29 NOTE — PROGRESS NOTES
Pt c/o too much pressure in her rectal area, unable to relieve w/ repositioning  OB resident down to see pt and removed a small amount of vaginal packing with relief of some pressure  Will monitor

## 2019-07-29 NOTE — ANESTHESIA PREPROCEDURE EVALUATION
Review of Systems/Medical History  Patient summary reviewed  Chart reviewed  No history of anesthetic complications     Cardiovascular  EKG reviewed, Hyperlipidemia, Hypertension controlled,    Pulmonary  Negative pulmonary ROS        GI/Hepatic    GERD well controlled,        Negative  ROS        Endo/Other  Diabetes , History of thyroid disease , hypothyroidism,      GYN  Negative gynecology ROS          Hematology  Negative hematology ROS      Musculoskeletal  Back pain , lumbar pain, Sciatica,   Arthritis     Neurology  Negative neurology ROS     Comment: Vertigo  "spinal arachnoid cyst" Psychology   Negative psychology ROS              Physical Exam    Airway    Mallampati score: II  TM Distance: >3 FB  Neck ROM: full     Dental   No notable dental hx     Cardiovascular  Rhythm: regular, Rate: normal, Cardiovascular exam normal    Pulmonary  Pulmonary exam normal Breath sounds clear to auscultation,     Other Findings        Anesthesia Plan  ASA Score- 2     Anesthesia Type- epidural with ASA Monitors  Additional Monitors:   Airway Plan:         Plan Factors-Patient not instructed to abstain from smoking on day of procedure  Patient did not smoke on day of surgery  Induction- intravenous  Postoperative Plan-     Informed Consent- Anesthetic plan and risks discussed with patient

## 2019-07-29 NOTE — INTERVAL H&P NOTE
/58   Pulse 63   Temp 97 8 °F (36 6 °C) (Temporal)   Resp 16   Ht 5' 4" (1 626 m)   Wt 76 2 kg (168 lb)   SpO2 97%   BMI 28 84 kg/m²   H&P reviewed  After examining the patient I find no changes in the patients condition since the H&P had been written

## 2019-07-29 NOTE — PROGRESS NOTES
Upon arrival to [facility] pt noticed a tick in her left antecubital area  Pt requested tweezers to remove tick, stating [place] "  Pt provided with sterile suture set and advised that the RN cannot remove tick for her  Pt states she does it by herself all the time  Pt made several attempts to remove tick, LAC area noted to have reddened areas, oozing drops of bllod  Pt provided with Band Aid

## 2019-07-29 NOTE — ANESTHESIA PROCEDURE NOTES
Epidural Block    Patient location during procedure: holding area  Start time: 7/29/2019 9:59 AM  Reason for block: primary anesthetic  Staffing  Anesthesiologist: Frank Dominique DO  Performed: anesthesiologist   Preanesthetic Checklist  Completed: patient identified, site marked, surgical consent, pre-op evaluation, timeout performed, IV checked, risks and benefits discussed and monitors and equipment checked  Epidural  Patient position: sitting  Prep: Betadine and site prepped and draped  Patient monitoring: frequent blood pressure checks and continuous pulse ox  Approach: midline  Location: lumbar (1-5)  Injection technique: RY air  Needle  Needle type: Tuohy   Needle gauge: 18 G  Catheter type: side hole  Catheter size: 20 G  Catheter at skin depth: 11 cm  Test dose: negativelidocaine 1 5% with epinephrine 1:200,000 test dose, 3 mL  midazolam (VERSED) 2 mg/2 mL IV, 2 mg  fentanyl 50 mcg/mL IV, 100 mcgnegative aspiration for CSF, negative aspiration for heme and no paresthesia on injection  patient tolerated the procedure well with no immediate complications

## 2019-07-30 VITALS
BODY MASS INDEX: 28.68 KG/M2 | SYSTOLIC BLOOD PRESSURE: 143 MMHG | RESPIRATION RATE: 16 BRPM | HEIGHT: 64 IN | OXYGEN SATURATION: 96 % | HEART RATE: 67 BPM | DIASTOLIC BLOOD PRESSURE: 82 MMHG | TEMPERATURE: 97.3 F | WEIGHT: 168 LBS

## 2019-07-30 LAB
GLUCOSE SERPL-MCNC: 103 MG/DL (ref 65–140)
GLUCOSE SERPL-MCNC: 111 MG/DL (ref 65–140)

## 2019-07-30 PROCEDURE — 82948 REAGENT STRIP/BLOOD GLUCOSE: CPT

## 2019-07-30 RX ORDER — ACETAMINOPHEN 325 MG/1
650 TABLET ORAL EVERY 6 HOURS PRN
Qty: 30 TABLET | Refills: 0
Start: 2019-07-30

## 2019-07-30 RX ORDER — DOCUSATE SODIUM 100 MG/1
100 CAPSULE, LIQUID FILLED ORAL 2 TIMES DAILY
Qty: 60 CAPSULE | Refills: 0
Start: 2019-07-30 | End: 2019-11-19

## 2019-07-30 RX ORDER — IBUPROFEN 600 MG/1
600 TABLET ORAL EVERY 6 HOURS PRN
Qty: 30 TABLET | Refills: 0
Start: 2019-07-30 | End: 2019-08-19

## 2019-07-30 RX ADMIN — ACETAMINOPHEN 650 MG: 325 TABLET ORAL at 06:07

## 2019-07-30 RX ADMIN — ACETAMINOPHEN 650 MG: 325 TABLET ORAL at 11:56

## 2019-07-30 RX ADMIN — IBUPROFEN 600 MG: 600 TABLET ORAL at 11:56

## 2019-07-30 RX ADMIN — LEVOTHYROXINE SODIUM 100 MCG: 100 TABLET ORAL at 06:07

## 2019-07-30 RX ADMIN — IBUPROFEN 600 MG: 600 TABLET ORAL at 06:07

## 2019-07-30 RX ADMIN — SODIUM CHLORIDE 75 ML/HR: 0.9 INJECTION, SOLUTION INTRAVENOUS at 02:58

## 2019-07-30 NOTE — DISCHARGE INSTRUCTIONS
Bladder Sling Procedure   WHAT YOU NEED TO KNOW:   A bladder sling procedure is surgery to treat urinary incontinence in women  The sling acts as a hammock to keep your urethra in place and hold it closed when your bladder is full  You may have vaginal bleeding or discharge for up to a week after your surgery  Use sanitary pads  Do not use tampons  You may have some pelvic discomfort or trouble urinating  DISCHARGE INSTRUCTIONS:   Call 911 for any of the following:   · You have sudden trouble breathing  Seek care immediately if:   · Your bleeding gets worse  · You have yellow or foul smelling discharge from your vagina  · You cannot urinate, or you are urinating less than what is normal for you  · You feel confused  Contact your healthcare provider if:   · You have a fever  · You do not feel like you are able to empty your bladder completely when you urinate  · You feel the need to urinate very suddenly  · You have burning or stinging when you urinate  · You have blood in your urine  · Your skin is itchy, swollen, or you have a rash  · You have questions or concerns about your condition or care  Medicines:   · Prescription pain medicine  may be given  Ask your how to take this medicine safely  · Take your medicine as directed  Contact your healthcare provider if you think your medicine is not helping or if you have side effects  Tell him or her if you are allergic to any medicine  Keep a list of the medicines, vitamins, and herbs you take  Include the amounts, and when and why you take them  Bring the list or the pill bottles to follow-up visits  Carry your medicine list with you in case of an emergency  Self-catheterization:  You may need to put a catheter into your bladder after you urinate to empty any remaining urine  A catheter is a small rubber tube used to drain urine  Healthcare providers will teach you how to put the catheter in safely   This may be needed until you are completely emptying your bladder  Koo catheter: You may have a Koo catheter for a short period of time  The Koo is a tube put into your bladder to drain urine into a bag  Keep the bag below your waist  This will prevent urine from flowing back into your bladder and causing an infection or other problems  Also, keep the tube free of kinks so the urine will drain properly  Do not pull on the catheter  This can cause pain and bleeding, and may cause the catheter to come out  Activity:  Do not lift heavy objects for 6 weeks after your procedure  Do not have intercourse for 4 to 6 weeks  Do not use a tampon for 4 weeks  Ask your healthcare provider when you can return to work or your usual activities  Do pelvic muscle exercises: These are also called Kegel exercises  These exercises help strengthen your pelvic muscles and help prevent urine leakage  Tighten the muscles of your pelvis and hold them tight for 5 seconds, then relax for 5 seconds  Gradually work up to tightening them for 10 seconds and relaxing for 10 seconds  Do this 3 times each day  Keep a record:  Keep a record of when you urinate and if you leak any urine  Write down what you were doing when you leaked urine, such as coughing or sneezing  Bring the log to your follow-up visits  Prevent constipation:  Drink liquids as directed  You may need to drink more water than usual to soften your bowel movements  Eat a variety of healthy foods, especially fruit and foods high in fiber  You may need to use an over-the-counter bowel movement softener  Follow up with your healthcare provider as directed: You may need a test to check how much urine remains in your bladder after you urinate  This will help show how the sling is working  Write down your questions so you remember to ask them during your visits    © 2017 [employer] Information is for End User's use only and may not be sold, redistributed or otherwise used for commercial purposes  All illustrations and images included in CareNotes® are the copyrighted property of A [employer]  or Maicol Álvarez  The above information is an  only  It is not intended as medical advice for individual conditions or treatments  Talk to your doctor, nurse or pharmacist before following any medical regimen to see if it is safe and effective for you  Anterior Vaginal Repair   WHAT YOU NEED TO KNOW:   An anterior vaginal repair is a procedure to lift or tighten the front vaginal wall  This can help prevent you from leaking urine  The procedure is also called an anterior colporrhaphy  DISCHARGE INSTRUCTIONS:   Medicines:   · Pain medicine: You may need medicine to take away or decrease pain  ¨ Learn how to take your medicine  Ask what medicine and how much you should take  Be sure you know how, when, and how often to take it  ¨ Do not wait until the pain is severe before you take your medicine  Tell caregivers if your pain does not decrease  ¨ Pain medicine can make you dizzy or sleepy  Prevent falls by calling someone when you get out of bed or if you need help  · Antibiotics: This medicine is given to fight or prevent an infection caused by bacteria  Always take your antibiotics exactly as ordered by your healthcare provider  Do not stop taking your medicine unless directed by your healthcare provider  Never save antibiotics or take leftover antibiotics that were given to you for another illness  · Take your medicine as directed  Contact your healthcare provider if you think your medicine is not helping or if you have side effects  Tell him or her if you are allergic to any medicine  Keep a list of the medicines, vitamins, and herbs you take  Include the amounts, and when and why you take them  Bring the list or the pill bottles to follow-up visits  Carry your medicine list with you in case of an emergency    Follow up with your healthcare provider as directed:  Write down your questions so you remember to ask them during your visits  Self-care:   · Sex:  Do not have sex until your healthcare provider says it is okay  · Kegel exercises: To do kegel exercises, squeeze your pelvic floor muscles for 5 to 10 seconds, then release  Regular kegel exercises will help your pelvic floor muscles become stronger  This will help prevent you from leaking urine  Ask your healthcare provider when to start these exercises and how often to do them  · Sanitary pad:  Change your sanitary pad regularly  Keep track of how often you change the pad  · Koo catheter:  Keep the bag below your waist  This will help prevent infection and other problems caused by urine flowing back into your bladder  Do not pull on the catheter because this can cause pain and bleeding, and the catheter could come out  Keep the catheter tubing free of kinks so your urine will flow into the bag  Your healthcare provider will remove the catheter as soon as possible, to help prevent infection  · Wound care:  When you are allowed to bathe or shower, carefully wash your vaginal area with soap and water  · Do not put pressure on your abdomen: This will help prevent damage to your surgery area  Do not strain, lift heavy objects, or stand for a very long time  Do not perform strenuous exercises, such as running and weight lifting  · Activity:  You may need to start walking within a few days after your procedure  Ask your healthcare provider when to start and how long you should walk  Ask about any other exercises that may be right for you  · Support socks: You may need to wear support socks  These are tight socks that help increase the circulation in your legs until you are more active  This helps prevent blood clots  Contact your healthcare provider if:   · You soak a sanitary pad with blood every hour for 4 hours      · You have vaginal pain that does not go away even after you take pain medicine  · You have pus or a foul-smelling discharge from your genital area  · You see blood in your urine  · You have pain during sex  · You have a fever, chills, a cough, or feel weak and achy  · You have nausea and vomiting  · You have questions or concerns about your condition or care  Seek care immediately or call 911 if:   · You feel something is bulging out into your vagina or rectum and not going back in     · You cannot urinate  · Your arm or leg feels warm, tender, and painful  It may look swollen and red  · You suddenly feel lightheaded and short of breath  · You have chest pain  You may have more pain when you take a deep breath or cough  You may cough up blood  © 2017 [address] Information is for End User's use only and may not be sold, redistributed or otherwise used for commercial purposes  All illustrations and images included in CareNotes® are the copyrighted property of A [employer] M [employer]  or Maicol Álvarez  The above information is an  only  It is not intended as medical advice for individual conditions or treatments  Talk to your doctor, nurse or pharmacist before following any medical regimen to see if it is safe and effective for you  Posterior Vaginal Repair   WHAT YOU NEED TO KNOW:   A posterior vaginal repair is surgery to fix a rectocele or vaginal hernia  DISCHARGE INSTRUCTIONS:   Medicines:   · Pain medicine  will help take away or decrease pain  Do not wait until the pain is severe before you take your medicine  · NSAIDs , such as ibuprofen, help decrease swelling, pain, and fever  This medicine is available with or without a doctor's order  NSAIDs can cause stomach bleeding or kidney problems in certain people  If you take blood thinner medicine, always ask your healthcare provider if NSAIDs are safe for you  Always read the medicine label and follow directions      · Bowel movement softeners make it easier for you to have a bowel movement  You may need this medicine to treat or prevent constipation  · Take your medicine as directed  Contact your healthcare provider if you think your medicine is not helping or if you have side effects  Tell him or her if you are allergic to any medicine  Keep a list of the medicines, vitamins, and herbs you take  Include the amounts, and when and why you take them  Bring the list or the pill bottles to follow-up visits  Carry your medicine list with you in case of an emergency  Follow up with your gynecologist in 2 weeks: You will need to return to have your incision checked  Write down your questions so you remember to ask them during your visits  Self-care:   · Do not have sex  until your healthcare provider says it is okay  · Do not put anything in your vagina  for 6 weeks after the surgery  This allows time for the wound to heal      · Do not lift more than 10 pounds  for at least 6 weeks  Heavy lifting puts pressure on the surgery area and slows healing  · Avoid heavy exercise  the first few weeks after the surgery  You may try light activity, such as short walks, 3 to 4 weeks after the surgery  · Try not to cough or strain to have a bowel movement  This may cause damage to the surgery area  Ask your healthcare provider about ways to make bowel movements easier so you do not have to strain  · Eat healthy foods and drink liquids as directed  This will help prevent constipation  Healthy foods include fruits, vegetables, whole-grain breads, low-fat dairy products, beans, lean meats, and fish  Contact your healthcare provider or gynecologist if:   · You have vaginal pain that does not go away, even after you take pain medicine  · You have pus or a foul-smelling discharge from your vagina  · You have pain during sex  · You have a fever or chills  · Your wound is red, swollen, or draining pus      · You have questions or concerns about your condition or care  Seek care immediately or call 911 if:   · You soak a sanitary pad with blood every hour for 4 hours  · You feel something is bulging out into your vagina or rectum and not going back in     · You cannot urinate  © 2017 [address] Information is for End User's use only and may not be sold, redistributed or otherwise used for commercial purposes  All illustrations and images included in CareNotes® are the copyrighted property of [employer] , Lizbeth  or Maicol Álvarez  The above information is an  only  It is not intended as medical advice for individual conditions or treatments  Talk to your doctor, nurse or pharmacist before following any medical regimen to see if it is safe and effective for you

## 2019-07-30 NOTE — PROGRESS NOTES
Progress Note - OB/GYN   Pani[place] Spruce 76 y o  female MRN: [id number]  Unit/Bed#: E2 -01 Encounter: [id number]    Assessment:  76 y o  POD#1 s/p s/p anterior vaginal extraperitoneal colpopexy, anterior and posterior colporrhaphy, pubovaginal sling, cystoscopy    Plan:  Vaginal/Rectal discomfort yesterday: improved after removal of 1 foot packing, no complaints today  Continue routine postoperative care, VSS  Encourage ambulation  Pain control as needed, tylenol, motrin  T2DM: SSI alg 3, glucose ; overall good gylcemic control  Koo removed, f/u voiding trial; discharge once passed    Disposition: anticipate discharge today    Subjective/Objective   Chief Complaint:     76 y o  POD#1 s/p s/p anterior vaginal extraperitoneal colpopexy, anterior and posterior colporrhaphy, pubovaginal sling, cystoscopy      Subjective:     Pain: minimal  Tolerating PO: yes  Voiding: adequate urine output 125cc/hr  Flatus: yes  BM: yes  Ambulating: yes  Chest pain: no  Shortness of breath: no  Leg pain: no      Objective:     Vitals: Blood pressure 135/67, pulse 61, temperature 97 9 °F (36 6 °C), temperature source Tympanic, resp  rate 16, height 5' 4" (1 626 m), weight 76 2 kg (168 lb), SpO2 95 %, not currently breastfeeding  Physical Exam:     Physical Exam   Constitutional: She is oriented to person, place, and time  She appears well-developed and well-nourished  No distress  Cardiovascular: Normal rate, regular rhythm, normal heart sounds and intact distal pulses  Pulmonary/Chest: Breath sounds normal  No stridor  No respiratory distress  Abdominal: Soft  Bowel sounds are normal  She exhibits no distension  There is no tenderness  Neurological: She is alert and oriented to person, place, and time  Skin: Skin is warm and dry  She is not diaphoretic  Psychiatric: She has a normal mood and affect  Her behavior is normal          Lab, Imaging and other studies: I have personally reviewed pertinent reports  Lab Results   Component Value Date    WBC 6 17 06/25/2019    HGB 12 3 06/25/2019    HCT 37 1 06/25/2019    MCV 98 06/25/2019     06/25/2019     Lab Results   Component Value Date     01/04/2016    SODIUM 143 06/25/2019    K 4 3 06/25/2019     06/25/2019    CO2 28 06/25/2019    ANIONGAP 8 01/04/2016    AGAP 8 06/25/2019    BUN 24 06/25/2019    CREATININE 0 89 06/25/2019    GLUC 93 05/09/2019    GLUF 115 (H) 06/25/2019    CALCIUM 8 8 06/25/2019    AST 16 05/09/2019    ALT 29 05/09/2019    ALKPHOS 51 05/09/2019    PROT 7 8 01/04/2016    TP 8 0 05/09/2019    BILITOT 0 35 01/04/2016    TBILI 0 31 05/09/2019    EGFR 64 06/25/2019       Magda Petersen DO  07/30/19

## 2019-07-31 NOTE — PROGRESS NOTES
Pt called regarding a rash starting 1 day post op and is red and pustular, and has moved to her back  Slightly itchy and red  Pt did wipe herself with the pre op wipes that commonly cause skin irritation  Reports no rashes during the day of surg  The rash is in the same distribution of where she wiped    rec using benadryl if needed and if it gets worse to call pcp for an eval  Discussed the above w pt and she understood the discussion     Fiona Augustin, DO

## 2019-08-01 ENCOUNTER — OFFICE VISIT (OUTPATIENT)
Dept: INTERNAL MEDICINE CLINIC | Facility: CLINIC | Age: 74
End: 2019-08-01
Payer: COMMERCIAL

## 2019-08-01 VITALS
HEART RATE: 64 BPM | TEMPERATURE: 97.7 F | DIASTOLIC BLOOD PRESSURE: 50 MMHG | SYSTOLIC BLOOD PRESSURE: 108 MMHG | RESPIRATION RATE: 16 BRPM | BODY MASS INDEX: 28.51 KG/M2 | HEIGHT: 64 IN | WEIGHT: 167 LBS

## 2019-08-01 DIAGNOSIS — R21 RASH: Primary | ICD-10-CM

## 2019-08-01 PROCEDURE — 1036F TOBACCO NON-USER: CPT | Performed by: NURSE PRACTITIONER

## 2019-08-01 PROCEDURE — 3008F BODY MASS INDEX DOCD: CPT | Performed by: NURSE PRACTITIONER

## 2019-08-01 PROCEDURE — 1160F RVW MEDS BY RX/DR IN RCRD: CPT | Performed by: NURSE PRACTITIONER

## 2019-08-01 PROCEDURE — 99213 OFFICE O/P EST LOW 20 MIN: CPT | Performed by: NURSE PRACTITIONER

## 2019-08-01 NOTE — PROGRESS NOTES
Assessment/Plan:    Patient Instructions    Rash of unclear etiology it is not wide spread isolated to abdomen back and under arms she is status post  Surgery for uterine prolapse 3 days ago  She spoke to surgical team who thought her rash was related to allergic reaction from chlorhexidine white  Additionally she did recently find a tick on her but there is no evidence of ECM rash  For now I recommend Benadryl 25 mg every 4-6 hours  If symptoms are worsening contact us  There is a rash on her neck I indicated if that worsens to contact us I would treat for Lyme if rash on neck worsens with doxycycline  Diagnoses and all orders for this visit:    Rash    Other orders  -     Cranberry (THERACRAN HP PO); Take by mouth  -     Cancel: Microalbumin / creatinine urine ratio         Subjective:      Patient ID: Sherre Soulier is a 76 y o  female     Patient had surgery for uterine prolapse 3 days ago  Preoperatively she was given a white that she had cleanse her body with  She had a surgery the next day went home took a shower and then noticed the rash  It seems to be every where she used the wipe  She did call Surgical team and anesthesia thought it was related to the chlorhexidine wipes  There were no other changes in her medications no new foods no new products  It is itchy  She does also state that on her way into the operating room she noticed a tick on her arm  She does not feel it could have been there for more than 24 hours  It was not engorged    But         Current Outpatient Medications:     acetaminophen (TYLENOL) 325 mg tablet, Take 2 tablets (650 mg total) by mouth every 6 (six) hours as needed for mild pain, Disp: 30 tablet, Rfl: 0    amLODIPine-benazepril (LOTREL 5-10) 5-10 MG per capsule, TAKE 1 CAPSULE EVERY DAY, Disp: 90 capsule, Rfl: 3    atorvastatin (LIPITOR) 20 mg tablet, TAKE 1 TABLET DAILY, Disp: 90 tablet, Rfl: 3    clotrimazole-betamethasone (LOTRISONE) 1-0 05 % cream, Apply topically as needed , Disp: , Rfl:     Cranberry (THERACRAN HP PO), Take by mouth, Disp: , Rfl:     docusate sodium (COLACE) 100 mg capsule, Take 1 capsule (100 mg total) by mouth 2 (two) times a day, Disp: 60 capsule, Rfl: 0    glucose blood (ONE TOUCH ULTRA TEST) test strip, 1 each by Other route daily Use as instructed, Disp: 100 each, Rfl: 2    ibuprofen (MOTRIN) 600 mg tablet, Take 1 tablet (600 mg total) by mouth every 6 (six) hours as needed for mild pain, Disp: 30 tablet, Rfl: 0    levothyroxine 100 mcg tablet, 1 po daily 5 days per week, Disp: 90 tablet, Rfl: 3    meloxicam (MOBIC) 15 mg tablet, TAKE 1 TABLET BY MOUTH EVERY DAY, Disp: 30 tablet, Rfl: 1    metFORMIN (GLUCOPHAGE-XR) 500 mg 24 hr tablet, Take 2 tablets (1,000 mg total) by mouth 2 (two) times a day with meals, Disp: 120 tablet, Rfl: 0    Multiple Vitamins-Minerals (CENTRUM SILVER) CHEW, Chew 1 tablet daily, Disp: , Rfl:     ONE TOUCH LANCETS MISC, by Does not apply route daily Test daily, Disp: 100 each, Rfl: 2    spironolactone (ALDACTONE) 25 mg tablet, TAKE 1 TABLET DAILY, Disp: 90 tablet, Rfl: 3    valACYclovir (VALTREX) 1,000 mg tablet, Take 1 tablet (1,000 mg total) by mouth as needed (as needed) for up to 119 days, Disp: 14 tablet, Rfl: 0    estradiol (ESTRACE) 0 1 mg/g vaginal cream, APPLY TWICE A WEEK 1/2GM VAGINALLY AT BEDTIME WEEKLY, Disp: , Rfl: 3    gabapentin (NEURONTIN) 300 mg capsule, 1 p o  Q h s  x2 days, b i d  X2 days then t i d  (Patient not taking: Reported on 8/1/2019), Disp: 90 capsule, Rfl: 3    Recent Results (from the past 1008 hour(s))   ECG 12 lead    Collection Time: 06/25/19 10:58 AM   Result Value Ref Range    Ventricular Rate 63 BPM    Atrial Rate 63 BPM    ME Interval 148 ms    QRSD Interval 86 ms    QT Interval 404 ms    QTC Interval 413 ms    P Axis 62 degrees    QRS Axis -45 degrees    T Wave Axis 49 degrees   CBC and differential    Collection Time: 06/25/19 12:01 PM   Result Value Ref Range    WBC 6 17 4 31 - 10 16 Thousand/uL    RBC 3 79 (L) 3 81 - 5 12 Million/uL    Hemoglobin 12 3 11 5 - 15 4 g/dL    Hematocrit 37 1 34 8 - 46 1 %    MCV 98 82 - 98 fL    MCH 32 5 26 8 - 34 3 pg    MCHC 33 2 31 4 - 37 4 g/dL    RDW 12 4 11 6 - 15 1 %    MPV 9 8 8 9 - 12 7 fL    Platelets 328 657 - 626 Thousands/uL    nRBC 0 /100 WBCs    Neutrophils Relative 54 43 - 75 %    Immat GRANS % 1 0 - 2 %    Lymphocytes Relative 32 14 - 44 %    Monocytes Relative 8 4 - 12 %    Eosinophils Relative 4 0 - 6 %    Basophils Relative 1 0 - 1 %    Neutrophils Absolute 3 42 1 85 - 7 62 Thousands/µL    Immature Grans Absolute 0 03 0 00 - 0 20 Thousand/uL    Lymphocytes Absolute 1 97 0 60 - 4 47 Thousands/µL    Monocytes Absolute 0 46 0 17 - 1 22 Thousand/µL    Eosinophils Absolute 0 23 0 00 - 0 61 Thousand/µL    Basophils Absolute 0 06 0 00 - 0 10 Thousands/µL   Basic metabolic panel    Collection Time: 06/25/19 12:01 PM   Result Value Ref Range    Sodium 143 136 - 145 mmol/L    Potassium 4 3 3 5 - 5 3 mmol/L    Chloride 107 100 - 108 mmol/L    CO2 28 21 - 32 mmol/L    ANION GAP 8 4 - 13 mmol/L    BUN 24 5 - 25 mg/dL    Creatinine 0 89 0 60 - 1 30 mg/dL    Glucose, Fasting 115 (H) 65 - 99 mg/dL    Calcium 8 8 8 3 - 10 1 mg/dL    eGFR 64 ml/min/1 73sq m   Fingerstick Glucose (POCT)    Collection Time: 07/29/19  8:59 AM   Result Value Ref Range    POC Glucose 105 65 - 140 mg/dl   Fingerstick Glucose (POCT)    Collection Time: 07/29/19  2:06 PM   Result Value Ref Range    POC Glucose 97 65 - 140 mg/dl   Fingerstick Glucose (POCT)    Collection Time: 07/29/19  3:43 PM   Result Value Ref Range    POC Glucose 91 65 - 140 mg/dl   Fingerstick Glucose (POCT)    Collection Time: 07/29/19  8:32 PM   Result Value Ref Range    POC Glucose 132 65 - 140 mg/dl   Fingerstick Glucose (POCT)    Collection Time: 07/30/19  7:20 AM   Result Value Ref Range    POC Glucose 103 65 - 140 mg/dl   Fingerstick Glucose (POCT)    Collection Time: 07/30/19 11:11 AM   Result Value Ref Range    POC Glucose 111 65 - 140 mg/dl       The following portions of the patient's history were reviewed and updated as appropriate: allergies, current medications, past family history, past medical history, past social history, past surgical history and problem list      Review of Systems   Constitutional: Negative for appetite change, chills, diaphoresis, fatigue, fever and unexpected weight change  HENT: Negative for postnasal drip and sneezing  Eyes: Negative for visual disturbance  Respiratory: Negative for chest tightness and shortness of breath  Cardiovascular: Negative for chest pain, palpitations and leg swelling  Gastrointestinal: Negative for abdominal pain and blood in stool  Endocrine: Negative for cold intolerance, heat intolerance, polydipsia, polyphagia and polyuria  Genitourinary: Negative for difficulty urinating, dysuria, frequency and urgency  Musculoskeletal: Negative for arthralgias and myalgias  Skin: Positive for rash  Negative for wound  Neurological: Negative for dizziness, weakness, light-headedness and headaches  Hematological: Negative for adenopathy  Bruises/bleeds easily:  was very small  Psychiatric/Behavioral: Negative for confusion, dysphoric mood and sleep disturbance  The patient is not nervous/anxious  Objective:      /50 (BP Location: Left arm, Patient Position: Sitting, Cuff Size: Standard)   Pulse 64   Temp 97 7 °F (36 5 °C) (Oral)   Resp 16   Ht 5' 4" (1 626 m)   Wt 75 8 kg (167 lb)   BMI 28 67 kg/m²        Physical Exam   Constitutional: She is oriented to person, place, and time  She appears well-developed  No distress  HENT:   Head: Normocephalic and atraumatic  Nose: Nose normal    Mouth/Throat: Oropharynx is clear and moist    Eyes: Pupils are equal, round, and reactive to light  Conjunctivae and EOM are normal    Neck: Normal range of motion  Neck supple  No JVD present   No tracheal deviation present  No thyromegaly present  Cardiovascular: Normal rate, regular rhythm, normal heart sounds and intact distal pulses  Exam reveals no gallop and no friction rub  No murmur heard  Pulmonary/Chest: Effort normal and breath sounds normal  No respiratory distress  She has no wheezes  She has no rales  Abdominal: Soft  Bowel sounds are normal  She exhibits no distension  There is no tenderness  Musculoskeletal: Normal range of motion  She exhibits no edema  Lymphadenopathy:     She has no cervical adenopathy  Neurological: She is alert and oriented to person, place, and time  No cranial nerve deficit  Skin: Skin is warm and dry  No rash noted  She is not diaphoretic  Very fine erythematous sandpaper rash noted to abdomen back and under arms    To her right neck there is an area of erythema that measures approximately 5 inches x 2 and 0 5 inches   Psychiatric: She has a normal mood and affect  Her behavior is normal  Judgment and thought content normal    BMI Counseling: Body mass index is 28 67 kg/m²  Discussed the patient's BMI with her  The BMI is above average  BMI counseling and education was provided to the patient  Nutrition recommendations include reducing portion sizes

## 2019-08-01 NOTE — PATIENT INSTRUCTIONS
Rash of unclear etiology it is not wide spread isolated to abdomen back and under arms she is status post  Surgery for uterine prolapse 3 days ago  She spoke to surgical team who thought her rash was related to allergic reaction from chlorhexidine white  Additionally she did recently find a tick on her but there is no evidence of ECM rash  For now I recommend Benadryl 25 mg every 4-6 hours  If symptoms are worsening contact us  There is a rash on her neck I indicated if that worsens to contact us I would treat for Lyme if rash on neck worsens with doxycycline

## 2019-08-07 ENCOUNTER — APPOINTMENT (OUTPATIENT)
Dept: LAB | Facility: CLINIC | Age: 74
End: 2019-08-07
Payer: COMMERCIAL

## 2019-08-07 ENCOUNTER — HOSPITAL ENCOUNTER (OUTPATIENT)
Dept: MAMMOGRAPHY | Facility: CLINIC | Age: 74
Discharge: HOME | End: 2019-08-07
Payer: COMMERCIAL

## 2019-08-07 DIAGNOSIS — E28.39 MENOPAUSE OVARIAN FAILURE: ICD-10-CM

## 2019-08-07 DIAGNOSIS — E11.9 TYPE 2 DIABETES MELLITUS WITHOUT COMPLICATION, WITHOUT LONG-TERM CURRENT USE OF INSULIN (HCC): ICD-10-CM

## 2019-08-07 DIAGNOSIS — I10 ESSENTIAL HYPERTENSION: ICD-10-CM

## 2019-08-07 DIAGNOSIS — E78.2 MIXED HYPERLIPIDEMIA: ICD-10-CM

## 2019-08-07 DIAGNOSIS — E03.9 ACQUIRED HYPOTHYROIDISM: ICD-10-CM

## 2019-08-07 LAB
ALBUMIN SERPL BCP-MCNC: 4.3 G/DL (ref 3.5–5)
ALP SERPL-CCNC: 42 U/L (ref 46–116)
ALT SERPL W P-5'-P-CCNC: 37 U/L (ref 12–78)
ANION GAP SERPL CALCULATED.3IONS-SCNC: 7 MMOL/L (ref 4–13)
AST SERPL W P-5'-P-CCNC: 24 U/L (ref 5–45)
BASOPHILS # BLD AUTO: 0.1 THOUSANDS/ΜL (ref 0–0.1)
BASOPHILS NFR BLD AUTO: 1 % (ref 0–1)
BILIRUB SERPL-MCNC: 0.43 MG/DL (ref 0.2–1)
BUN SERPL-MCNC: 21 MG/DL (ref 5–25)
CALCIUM SERPL-MCNC: 9.3 MG/DL (ref 8.3–10.1)
CHLORIDE SERPL-SCNC: 107 MMOL/L (ref 100–108)
CHOLEST SERPL-MCNC: 94 MG/DL (ref 50–200)
CO2 SERPL-SCNC: 27 MMOL/L (ref 21–32)
CREAT SERPL-MCNC: 0.94 MG/DL (ref 0.6–1.3)
EOSINOPHIL # BLD AUTO: 0.28 THOUSAND/ΜL (ref 0–0.61)
EOSINOPHIL NFR BLD AUTO: 3 % (ref 0–6)
ERYTHROCYTE [DISTWIDTH] IN BLOOD BY AUTOMATED COUNT: 12.6 % (ref 11.6–15.1)
EST. AVERAGE GLUCOSE BLD GHB EST-MCNC: 134 MG/DL
GFR SERPL CREATININE-BSD FRML MDRD: 60 ML/MIN/1.73SQ M
GLUCOSE P FAST SERPL-MCNC: 89 MG/DL (ref 65–99)
HBA1C MFR BLD: 6.3 % (ref 4.2–6.3)
HCT VFR BLD AUTO: 39.3 % (ref 34.8–46.1)
HDLC SERPL-MCNC: 40 MG/DL (ref 40–60)
HGB BLD-MCNC: 12.7 G/DL (ref 11.5–15.4)
IMM GRANULOCYTES # BLD AUTO: 0.07 THOUSAND/UL (ref 0–0.2)
IMM GRANULOCYTES NFR BLD AUTO: 1 % (ref 0–2)
LDLC SERPL CALC-MCNC: 32 MG/DL (ref 0–100)
LYMPHOCYTES # BLD AUTO: 2.14 THOUSANDS/ΜL (ref 0.6–4.47)
LYMPHOCYTES NFR BLD AUTO: 24 % (ref 14–44)
MCH RBC QN AUTO: 32 PG (ref 26.8–34.3)
MCHC RBC AUTO-ENTMCNC: 32.3 G/DL (ref 31.4–37.4)
MCV RBC AUTO: 99 FL (ref 82–98)
MONOCYTES # BLD AUTO: 0.54 THOUSAND/ΜL (ref 0.17–1.22)
MONOCYTES NFR BLD AUTO: 6 % (ref 4–12)
NEUTROPHILS # BLD AUTO: 5.91 THOUSANDS/ΜL (ref 1.85–7.62)
NEUTS SEG NFR BLD AUTO: 65 % (ref 43–75)
NONHDLC SERPL-MCNC: 54 MG/DL
NRBC BLD AUTO-RTO: 0 /100 WBCS
PLATELET # BLD AUTO: 296 THOUSANDS/UL (ref 149–390)
PMV BLD AUTO: 9.9 FL (ref 8.9–12.7)
POTASSIUM SERPL-SCNC: 4.8 MMOL/L (ref 3.5–5.3)
PROT SERPL-MCNC: 7.6 G/DL (ref 6.4–8.2)
RBC # BLD AUTO: 3.97 MILLION/UL (ref 3.81–5.12)
SODIUM SERPL-SCNC: 141 MMOL/L (ref 136–145)
TRIGL SERPL-MCNC: 108 MG/DL
TSH SERPL DL<=0.05 MIU/L-ACNC: 2.63 UIU/ML (ref 0.36–3.74)
WBC # BLD AUTO: 9.04 THOUSAND/UL (ref 4.31–10.16)

## 2019-08-07 PROCEDURE — 83036 HEMOGLOBIN GLYCOSYLATED A1C: CPT

## 2019-08-07 PROCEDURE — 84443 ASSAY THYROID STIM HORMONE: CPT

## 2019-08-07 PROCEDURE — 80053 COMPREHEN METABOLIC PANEL: CPT

## 2019-08-07 PROCEDURE — 80061 LIPID PANEL: CPT

## 2019-08-07 PROCEDURE — 36415 COLL VENOUS BLD VENIPUNCTURE: CPT

## 2019-08-07 PROCEDURE — 77080 DXA BONE DENSITY AXIAL: CPT

## 2019-08-07 PROCEDURE — 85025 COMPLETE CBC W/AUTO DIFF WBC: CPT

## 2019-08-14 DIAGNOSIS — E11.9 TYPE 2 DIABETES MELLITUS WITHOUT COMPLICATION, WITHOUT LONG-TERM CURRENT USE OF INSULIN (HCC): ICD-10-CM

## 2019-08-15 RX ORDER — METFORMIN HYDROCHLORIDE 500 MG/1
TABLET, EXTENDED RELEASE ORAL
Qty: 120 TABLET | Refills: 5 | Status: SHIPPED | OUTPATIENT
Start: 2019-08-15 | End: 2019-12-02 | Stop reason: SDUPTHER

## 2019-08-19 ENCOUNTER — OFFICE VISIT (OUTPATIENT)
Dept: INTERNAL MEDICINE CLINIC | Facility: CLINIC | Age: 74
End: 2019-08-19
Payer: COMMERCIAL

## 2019-08-19 VITALS
DIASTOLIC BLOOD PRESSURE: 52 MMHG | SYSTOLIC BLOOD PRESSURE: 110 MMHG | HEIGHT: 64 IN | BODY MASS INDEX: 27.49 KG/M2 | WEIGHT: 161 LBS | HEART RATE: 77 BPM

## 2019-08-19 DIAGNOSIS — E11.9 TYPE 2 DIABETES MELLITUS WITHOUT COMPLICATION, WITHOUT LONG-TERM CURRENT USE OF INSULIN (HCC): Primary | ICD-10-CM

## 2019-08-19 DIAGNOSIS — E78.2 MIXED HYPERLIPIDEMIA: ICD-10-CM

## 2019-08-19 DIAGNOSIS — E03.9 ACQUIRED HYPOTHYROIDISM: ICD-10-CM

## 2019-08-19 DIAGNOSIS — M54.16 LUMBAR RADICULOPATHY: ICD-10-CM

## 2019-08-19 DIAGNOSIS — I10 ESSENTIAL HYPERTENSION: ICD-10-CM

## 2019-08-19 PROCEDURE — 3074F SYST BP LT 130 MM HG: CPT | Performed by: INTERNAL MEDICINE

## 2019-08-19 PROCEDURE — 99214 OFFICE O/P EST MOD 30 MIN: CPT | Performed by: INTERNAL MEDICINE

## 2019-08-19 NOTE — PATIENT INSTRUCTIONS
Lab data reviewed in detail and compared prior  Bone density reviewed and normal    Hypertension stable on present regimen    Hyperlipidemia at goal on atorvastatin    Hypothyroidism stable on levothyroxine    Type 2 diabetes mellitus under excellent control on metformin    Lumbar radiculopathy improved, okay to resume meloxicam if needed for pain  Routine follow-up after labs in 6 months, sooner as needed

## 2019-08-19 NOTE — PROGRESS NOTES
Assessment/Plan:    Diagnoses and all orders for this visit:    Type 2 diabetes mellitus without complication, without long-term current use of insulin (HCC)  -     Microalbumin / creatinine urine ratio; Future  -     Hemoglobin A1C; Future    Acquired hypothyroidism  -     TSH, 3rd generation with Free T4 reflex; Future    Essential hypertension  -     CBC and differential; Future  -     Comprehensive metabolic panel; Future    Lumbar radiculopathy    Mixed hyperlipidemia  -     Lipid panel; Future         Patient Instructions   Lab data reviewed in detail and compared prior  Bone density reviewed and normal    Hypertension stable on present regimen    Hyperlipidemia at goal on atorvastatin    Hypothyroidism stable on levothyroxine    Type 2 diabetes mellitus under excellent control on metformin    Lumbar radiculopathy improved, okay to resume meloxicam if needed for pain  Routine follow-up after labs in 6 months, sooner as needed  Subjective:      Patient ID: Clive Madison is a 76 y o  female    Feeling generally well  Recovering from gyn surgery 7/29  Lumbar radiculopathy resolved, not taking mobic or gabapentin  HTN/HPL-taking rx as directed, home bp's have been stable  DM- Taking metformin as directed  Am sugars stable 105  OAB-urination much better since surgery  2x nocturia  Hypothyroid taking rx as directed  Diarrhea since April, contributes to stool softener, colo in July, several polyps           Current Outpatient Medications:     acetaminophen (TYLENOL) 325 mg tablet, Take 2 tablets (650 mg total) by mouth every 6 (six) hours as needed for mild pain, Disp: 30 tablet, Rfl: 0    amLODIPine-benazepril (LOTREL 5-10) 5-10 MG per capsule, TAKE 1 CAPSULE EVERY DAY, Disp: 90 capsule, Rfl: 3    atorvastatin (LIPITOR) 20 mg tablet, TAKE 1 TABLET DAILY, Disp: 90 tablet, Rfl: 3    clotrimazole-betamethasone (LOTRISONE) 1-0 05 % cream, Apply topically as needed , Disp: , Rfl:     Cranberry (THERACRAN HP PO), Take by mouth, Disp: , Rfl:     docusate sodium (COLACE) 100 mg capsule, Take 1 capsule (100 mg total) by mouth 2 (two) times a day, Disp: 60 capsule, Rfl: 0    glucose blood (ONE TOUCH ULTRA TEST) test strip, 1 each by Other route daily Use as instructed, Disp: 100 each, Rfl: 2    levothyroxine 100 mcg tablet, 1 po daily 5 days per week, Disp: 90 tablet, Rfl: 3    metFORMIN (GLUCOPHAGE-XR) 500 mg 24 hr tablet, START WITH 1 TABLET DAILY W/ DINNER, INCREASE BY 1 TAB EACH WEEK AS TOLERATED UNTIL TAKING 4 TABS, Disp: 120 tablet, Rfl: 5    Multiple Vitamins-Minerals (CENTRUM SILVER) CHEW, Chew 1 tablet daily, Disp: , Rfl:     ONE TOUCH LANCETS MISC, by Does not apply route daily Test daily, Disp: 100 each, Rfl: 2    spironolactone (ALDACTONE) 25 mg tablet, TAKE 1 TABLET DAILY, Disp: 90 tablet, Rfl: 3    valACYclovir (VALTREX) 1,000 mg tablet, Take 1 tablet (1,000 mg total) by mouth as needed (as needed) for up to 119 days, Disp: 14 tablet, Rfl: 0    Recent Results (from the past 1008 hour(s))   Fingerstick Glucose (POCT)    Collection Time: 07/29/19  8:59 AM   Result Value Ref Range    POC Glucose 105 65 - 140 mg/dl   Fingerstick Glucose (POCT)    Collection Time: 07/29/19  2:06 PM   Result Value Ref Range    POC Glucose 97 65 - 140 mg/dl   Fingerstick Glucose (POCT)    Collection Time: 07/29/19  3:43 PM   Result Value Ref Range    POC Glucose 91 65 - 140 mg/dl   Fingerstick Glucose (POCT)    Collection Time: 07/29/19  8:32 PM   Result Value Ref Range    POC Glucose 132 65 - 140 mg/dl   Fingerstick Glucose (POCT)    Collection Time: 07/30/19  7:20 AM   Result Value Ref Range    POC Glucose 103 65 - 140 mg/dl   Fingerstick Glucose (POCT)    Collection Time: 07/30/19 11:11 AM   Result Value Ref Range    POC Glucose 111 65 - 140 mg/dl   CBC and differential    Collection Time: 08/07/19 11:45 AM   Result Value Ref Range    WBC 9 04 4 31 - 10 16 Thousand/uL    RBC 3 97 3 81 - 5 12 Million/uL Hemoglobin 12 7 11 5 - 15 4 g/dL    Hematocrit 39 3 34 8 - 46 1 %    MCV 99 (H) 82 - 98 fL    MCH 32 0 26 8 - 34 3 pg    MCHC 32 3 31 4 - 37 4 g/dL    RDW 12 6 11 6 - 15 1 %    MPV 9 9 8 9 - 12 7 fL    Platelets 064 471 - 997 Thousands/uL    nRBC 0 /100 WBCs    Neutrophils Relative 65 43 - 75 %    Immat GRANS % 1 0 - 2 %    Lymphocytes Relative 24 14 - 44 %    Monocytes Relative 6 4 - 12 %    Eosinophils Relative 3 0 - 6 %    Basophils Relative 1 0 - 1 %    Neutrophils Absolute 5 91 1 85 - 7 62 Thousands/µL    Immature Grans Absolute 0 07 0 00 - 0 20 Thousand/uL    Lymphocytes Absolute 2 14 0 60 - 4 47 Thousands/µL    Monocytes Absolute 0 54 0 17 - 1 22 Thousand/µL    Eosinophils Absolute 0 28 0 00 - 0 61 Thousand/µL    Basophils Absolute 0 10 0 00 - 0 10 Thousands/µL   Comprehensive metabolic panel    Collection Time: 08/07/19 11:45 AM   Result Value Ref Range    Sodium 141 136 - 145 mmol/L    Potassium 4 8 3 5 - 5 3 mmol/L    Chloride 107 100 - 108 mmol/L    CO2 27 21 - 32 mmol/L    ANION GAP 7 4 - 13 mmol/L    BUN 21 5 - 25 mg/dL    Creatinine 0 94 0 60 - 1 30 mg/dL    Glucose, Fasting 89 65 - 99 mg/dL    Calcium 9 3 8 3 - 10 1 mg/dL    AST 24 5 - 45 U/L    ALT 37 12 - 78 U/L    Alkaline Phosphatase 42 (L) 46 - 116 U/L    Total Protein 7 6 6 4 - 8 2 g/dL    Albumin 4 3 3 5 - 5 0 g/dL    Total Bilirubin 0 43 0 20 - 1 00 mg/dL    eGFR 60 ml/min/1 73sq m   Lipid panel    Collection Time: 08/07/19 11:45 AM   Result Value Ref Range    Cholesterol 94 50 - 200 mg/dL    Triglycerides 108 <=150 mg/dL    HDL, Direct 40 40 - 60 mg/dL    LDL Calculated 32 0 - 100 mg/dL    Non-HDL-Chol (CHOL-HDL) 54 mg/dl   Hemoglobin A1C    Collection Time: 08/07/19 11:45 AM   Result Value Ref Range    Hemoglobin A1C 6 3 4 2 - 6 3 %     mg/dl   TSH, 3rd generation with Free T4 reflex    Collection Time: 08/07/19 11:45 AM   Result Value Ref Range    TSH 3RD [facility] 2 630 0 358 - 3 740 uIU/mL       The following portions of the patient's history were reviewed and updated as appropriate: allergies, current medications, past family history, past medical history, past social history, past surgical history and problem list      Review of Systems   Constitutional: Negative for appetite change, chills, diaphoresis, fatigue, fever and unexpected weight change  HENT: Negative for congestion, hearing loss and rhinorrhea  Eyes: Negative for visual disturbance  Respiratory: Negative for cough, chest tightness, shortness of breath and wheezing  Cardiovascular: Negative for chest pain, palpitations and leg swelling  Gastrointestinal: Positive for diarrhea  Negative for abdominal pain and blood in stool  Endocrine: Negative for cold intolerance, heat intolerance, polydipsia and polyuria  Genitourinary: Negative for difficulty urinating, dysuria, frequency and urgency  Musculoskeletal: Negative for arthralgias and myalgias  Skin: Negative for rash  Neurological: Negative for dizziness, weakness, light-headedness and headaches  Hematological: Does not bruise/bleed easily  Psychiatric/Behavioral: Negative for dysphoric mood and sleep disturbance  Objective:      Vitals:    08/19/19 1653   BP: 110/52   Pulse: 77          Physical Exam   Constitutional: She is oriented to person, place, and time  She appears well-developed and well-nourished  HENT:   Head: Normocephalic and atraumatic  Nose: Nose normal    Mouth/Throat: Oropharynx is clear and moist    Eyes: Pupils are equal, round, and reactive to light  Conjunctivae and EOM are normal  No scleral icterus  Neck: Normal range of motion  Neck supple  No JVD present  No tracheal deviation present  No thyromegaly present  Cardiovascular: Normal rate, regular rhythm and intact distal pulses  Exam reveals no gallop and no friction rub  No murmur heard  Pulmonary/Chest: Effort normal and breath sounds normal  No respiratory distress  She has no wheezes   She has no rales    Musculoskeletal: She exhibits no edema or deformity  Lymphadenopathy:     She has no cervical adenopathy  Neurological: She is alert and oriented to person, place, and time  No cranial nerve deficit  Skin: Skin is warm and dry  No rash noted  No erythema  No pallor  Psychiatric: She has a normal mood and affect   Her behavior is normal  Judgment and thought content normal

## 2019-08-27 ENCOUNTER — HOSPITAL ENCOUNTER (OUTPATIENT)
Dept: RADIOLOGY | Facility: HOSPITAL | Age: 74
Discharge: HOME | End: 2019-08-27
Attending: INTERNAL MEDICINE
Payer: COMMERCIAL

## 2019-08-27 ENCOUNTER — TELEPHONE (OUTPATIENT)
Dept: INTERNAL MEDICINE CLINIC | Facility: CLINIC | Age: 74
End: 2019-08-27

## 2019-08-27 DIAGNOSIS — R05.9 COUGH: ICD-10-CM

## 2019-08-27 DIAGNOSIS — R05.9 COUGH: Primary | ICD-10-CM

## 2019-08-27 PROCEDURE — 71046 X-RAY EXAM CHEST 2 VIEWS: CPT

## 2019-08-27 NOTE — TELEPHONE ENCOUNTER
----- Message from Zuri Garza MD sent at 8/27/2019  5:18 PM EDT -----  Notify -chest x-ray is normal

## 2019-08-29 DIAGNOSIS — M54.16 LUMBAR RADICULOPATHY: ICD-10-CM

## 2019-08-29 RX ORDER — GABAPENTIN 300 MG/1
CAPSULE ORAL
Qty: 90 CAPSULE | Refills: 3 | Status: SHIPPED | OUTPATIENT
Start: 2019-08-29 | End: 2019-11-19

## 2019-08-29 RX ORDER — MELOXICAM 15 MG/1
TABLET ORAL
Qty: 30 TABLET | Refills: 1 | Status: SHIPPED | OUTPATIENT
Start: 2019-08-29 | End: 2019-09-21 | Stop reason: SDUPTHER

## 2019-09-21 DIAGNOSIS — M54.16 LUMBAR RADICULOPATHY: ICD-10-CM

## 2019-09-22 RX ORDER — MELOXICAM 15 MG/1
TABLET ORAL
Qty: 30 TABLET | Refills: 1 | Status: SHIPPED | OUTPATIENT
Start: 2019-09-22 | End: 2019-10-20 | Stop reason: SDUPTHER

## 2019-10-20 DIAGNOSIS — M54.16 LUMBAR RADICULOPATHY: ICD-10-CM

## 2019-10-20 RX ORDER — MELOXICAM 15 MG/1
TABLET ORAL
Qty: 30 TABLET | Refills: 1 | Status: SHIPPED | OUTPATIENT
Start: 2019-10-20 | End: 2019-11-13 | Stop reason: SDUPTHER

## 2019-11-04 DIAGNOSIS — E11.9 TYPE 2 DIABETES MELLITUS WITHOUT COMPLICATION, WITHOUT LONG-TERM CURRENT USE OF INSULIN (HCC): ICD-10-CM

## 2019-11-04 RX ORDER — LANCETS 30 GAUGE
EACH MISCELLANEOUS
Qty: 100 EACH | Refills: 2 | Status: SHIPPED | OUTPATIENT
Start: 2019-11-04

## 2019-11-06 DIAGNOSIS — E11.9 TYPE 2 DIABETES MELLITUS WITHOUT COMPLICATION, WITHOUT LONG-TERM CURRENT USE OF INSULIN (HCC): ICD-10-CM

## 2019-11-13 DIAGNOSIS — M54.16 LUMBAR RADICULOPATHY: ICD-10-CM

## 2019-11-13 RX ORDER — MELOXICAM 15 MG/1
TABLET ORAL
Qty: 30 TABLET | Refills: 1 | Status: SHIPPED | OUTPATIENT
Start: 2019-11-13 | End: 2019-12-07 | Stop reason: SDUPTHER

## 2019-11-19 ENCOUNTER — APPOINTMENT (OUTPATIENT)
Dept: LAB | Facility: CLINIC | Age: 74
End: 2019-11-19
Payer: COMMERCIAL

## 2019-11-19 ENCOUNTER — OFFICE VISIT (OUTPATIENT)
Dept: INTERNAL MEDICINE CLINIC | Facility: CLINIC | Age: 74
End: 2019-11-19
Payer: COMMERCIAL

## 2019-11-19 VITALS
WEIGHT: 160.4 LBS | BODY MASS INDEX: 27.39 KG/M2 | HEART RATE: 80 BPM | SYSTOLIC BLOOD PRESSURE: 114 MMHG | DIASTOLIC BLOOD PRESSURE: 58 MMHG | HEIGHT: 64 IN | RESPIRATION RATE: 12 BRPM

## 2019-11-19 DIAGNOSIS — G47.62 NOCTURNAL LEG CRAMPS: ICD-10-CM

## 2019-11-19 DIAGNOSIS — E78.2 MIXED HYPERLIPIDEMIA: ICD-10-CM

## 2019-11-19 DIAGNOSIS — L23.1 ALLERGIC CONTACT DERMATITIS DUE TO ADHESIVES: ICD-10-CM

## 2019-11-19 DIAGNOSIS — F32.9 REACTIVE DEPRESSION: ICD-10-CM

## 2019-11-19 DIAGNOSIS — N30.00 ACUTE CYSTITIS WITHOUT HEMATURIA: Primary | ICD-10-CM

## 2019-11-19 LAB
ANION GAP SERPL CALCULATED.3IONS-SCNC: 9 MMOL/L (ref 4–13)
BACTERIA UR QL AUTO: ABNORMAL /HPF
BILIRUB UR QL STRIP: NEGATIVE
BUN SERPL-MCNC: 21 MG/DL (ref 5–25)
CALCIUM SERPL-MCNC: 9.7 MG/DL (ref 8.3–10.1)
CHLORIDE SERPL-SCNC: 108 MMOL/L (ref 100–108)
CK SERPL-CCNC: 106 U/L (ref 26–192)
CLARITY UR: ABNORMAL
CO2 SERPL-SCNC: 24 MMOL/L (ref 21–32)
COLOR UR: YELLOW
CREAT SERPL-MCNC: 0.93 MG/DL (ref 0.6–1.3)
GFR SERPL CREATININE-BSD FRML MDRD: 61 ML/MIN/1.73SQ M
GLUCOSE SERPL-MCNC: 113 MG/DL (ref 65–140)
GLUCOSE UR STRIP-MCNC: NEGATIVE MG/DL
HGB UR QL STRIP.AUTO: ABNORMAL
KETONES UR STRIP-MCNC: NEGATIVE MG/DL
LEUKOCYTE ESTERASE UR QL STRIP: ABNORMAL
MAGNESIUM SERPL-MCNC: 1.5 MG/DL (ref 1.6–2.6)
NITRITE UR QL STRIP: POSITIVE
NON-SQ EPI CELLS URNS QL MICRO: ABNORMAL /HPF
PH UR STRIP.AUTO: 5.5 [PH]
PHOSPHATE SERPL-MCNC: 3.3 MG/DL (ref 2.3–4.1)
POTASSIUM SERPL-SCNC: 4.6 MMOL/L (ref 3.5–5.3)
PROT UR STRIP-MCNC: ABNORMAL MG/DL
RBC #/AREA URNS AUTO: ABNORMAL /HPF
SODIUM SERPL-SCNC: 141 MMOL/L (ref 136–145)
SP GR UR STRIP.AUTO: 1.01 (ref 1–1.03)
UROBILINOGEN UR QL STRIP.AUTO: 0.2 E.U./DL
WBC #/AREA URNS AUTO: ABNORMAL /HPF

## 2019-11-19 PROCEDURE — 99214 OFFICE O/P EST MOD 30 MIN: CPT | Performed by: INTERNAL MEDICINE

## 2019-11-19 PROCEDURE — 83735 ASSAY OF MAGNESIUM: CPT

## 2019-11-19 PROCEDURE — 80048 BASIC METABOLIC PNL TOTAL CA: CPT

## 2019-11-19 PROCEDURE — 81001 URINALYSIS AUTO W/SCOPE: CPT | Performed by: INTERNAL MEDICINE

## 2019-11-19 PROCEDURE — 36415 COLL VENOUS BLD VENIPUNCTURE: CPT

## 2019-11-19 PROCEDURE — 87186 SC STD MICRODIL/AGAR DIL: CPT | Performed by: INTERNAL MEDICINE

## 2019-11-19 PROCEDURE — 1036F TOBACCO NON-USER: CPT | Performed by: INTERNAL MEDICINE

## 2019-11-19 PROCEDURE — 84100 ASSAY OF PHOSPHORUS: CPT

## 2019-11-19 PROCEDURE — 87086 URINE CULTURE/COLONY COUNT: CPT | Performed by: INTERNAL MEDICINE

## 2019-11-19 PROCEDURE — 82550 ASSAY OF CK (CPK): CPT

## 2019-11-19 PROCEDURE — 1160F RVW MEDS BY RX/DR IN RCRD: CPT | Performed by: INTERNAL MEDICINE

## 2019-11-19 RX ORDER — ESCITALOPRAM OXALATE 5 MG/1
5 TABLET ORAL DAILY
Qty: 30 TABLET | Refills: 5 | Status: SHIPPED | OUTPATIENT
Start: 2019-11-19 | End: 2020-05-13

## 2019-11-19 RX ORDER — CEPHALEXIN 500 MG/1
500 CAPSULE ORAL EVERY 12 HOURS SCHEDULED
Qty: 14 CAPSULE | Refills: 0 | Status: SHIPPED | OUTPATIENT
Start: 2019-11-19 | End: 2019-11-26

## 2019-11-19 NOTE — PATIENT INSTRUCTIONS
Symptoms suggestive of acute cystitis-check urinalysis and culture, will treat empirically with Keflex and follow-up culture results  If you are unable to give a sample then it is okay just to treat with cephalexin and only do the urinalysis if symptoms fail to improve as expected over the next couple of days  Contact me for any rash or other allergic reaction  Dermatitis appears to be related to adhesive  Use topical hydrocortisone available over-the-counter and avoid further use of these Band-Aids  Nocturnal leg cramps-often there is no known underlying cause or specific treatment for this problem  Will check electrolytes and follow accordingly  Hold atorvastatin for a period of 3 weeks and see if that improves the symptoms if not go back on the atorvastatin, if it does improve the symptoms then contact me for alternative treatment of cholesterol      Reactive depression/anxiety and insomnia-start Lexapro 5 mg daily for 4-8 days and then increase to 10 mg daily, follow-up in 1 month

## 2019-11-19 NOTE — PROGRESS NOTES
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    Mixed hyperlipidemia    Nocturnal leg cramps  -     Magnesium; Future  -     Phosphorus; Future  -     CK; Future  -     Basic metabolic panel; Future    Reactive depression  -     escitalopram (LEXAPRO) 5 mg tablet; Take 1 tablet (5 mg total) by mouth daily         Patient Instructions   Symptoms suggestive of acute cystitis-check urinalysis and culture, will treat empirically with Keflex and follow-up culture results  If you are unable to give a sample then it is okay just to treat with cephalexin and only do the urinalysis if symptoms fail to improve as expected over the next couple of days  Contact me for any rash or other allergic reaction  Dermatitis appears to be related to adhesive  Use topical hydrocortisone available over-the-counter and avoid further use of these Band-Aids  Nocturnal leg cramps-often there is no known underlying cause or specific treatment for this problem  Will check electrolytes and follow accordingly  Hold atorvastatin for a period of 3 weeks and see if that improves the symptoms if not go back on the atorvastatin, if it does improve the symptoms then contact me for alternative treatment of cholesterol  Reactive depression/anxiety and insomnia-start Lexapro 5 mg daily for 4-8 days and then increase to 10 mg daily, follow-up in 1 month      Subjective:      Patient ID: Darhawaa Mikal is a 76 y o  female    Patient presents for evaluation of probable urinary tract infection  She has noticed increased urgency, frequency and dysuria over the last 2 days  She has not had any hematuria  She denies any nausea, vomiting, fever, chill or flank pain      She was scratched by her cat a few days ago and put a Band-Aid over the lesion on her left cheek and now she has and hot and itchy well-circumscribed area on the left cheek in the shape of her Band-Aid  Patient is also experiencing cramps in the legs mostly at night almost every night  Cramps wake her up from sleep  She needs to get out of bed and put the foot down to alleviate the pain  Patient also notes difficulty sleeping, depression and anxiety related to dealing with her 's advanced malignancy and trying to be strong for him  She becomes tearful as we discussed this          Current Outpatient Medications:     acetaminophen (TYLENOL) 325 mg tablet, Take 2 tablets (650 mg total) by mouth every 6 (six) hours as needed for mild pain, Disp: 30 tablet, Rfl: 0    amLODIPine-benazepril (LOTREL 5-10) 5-10 MG per capsule, TAKE 1 CAPSULE EVERY DAY, Disp: 90 capsule, Rfl: 3    atorvastatin (LIPITOR) 20 mg tablet, TAKE 1 TABLET DAILY, Disp: 90 tablet, Rfl: 3    clotrimazole-betamethasone (LOTRISONE) 1-0 05 % cream, Apply topically as needed , Disp: , Rfl:     Lancets (ONETOUCH DELICA PLUS [id number]) MISC, TEST DAILY, Disp: 100 each, Rfl: 2    levothyroxine 100 mcg tablet, 1 po daily 5 days per week, Disp: 90 tablet, Rfl: 3    meloxicam (MOBIC) 15 mg tablet, TAKE 1 TABLET BY MOUTH EVERY DAY (Patient taking differently: as needed ), Disp: 30 tablet, Rfl: 1    metFORMIN (GLUCOPHAGE-XR) 500 mg 24 hr tablet, START WITH 1 TABLET DAILY W/ DINNER, INCREASE BY 1 TAB EACH WEEK AS TOLERATED UNTIL TAKING 4 TABS, Disp: 120 tablet, Rfl: 5    Multiple Vitamins-Minerals (CENTRUM SILVER) CHEW, Chew 1 tablet daily, Disp: , Rfl:     ONE TOUCH ULTRA TEST test strip, 1 EACH BY OTHER ROUTE DAILY USE AS INSTRUCTED, Disp: 100 each, Rfl: 2    spironolactone (ALDACTONE) 25 mg tablet, TAKE 1 TABLET DAILY, Disp: 90 tablet, Rfl: 3    valACYclovir (VALTREX) 1,000 mg tablet, Take 1 tablet (1,000 mg total) by mouth as needed (as needed) for up to 119 days, Disp: 14 tablet, Rfl: 0    cephalexin (KEFLEX) 500 mg capsule, Take 1 capsule (500 mg total) by mouth every 12 (twelve) hours for 7 days, Disp: 14 capsule, Rfl: 0    escitalopram (LEXAPRO) 5 mg tablet, Take 1 tablet (5 mg total) by mouth daily, Disp: 30 tablet, Rfl: 5    No results found for this or any previous visit (from the past 1008 hour(s))  The following portions of the patient's history were reviewed and updated as appropriate: allergies, current medications, past family history, past medical history, past social history, past surgical history and problem list      Review of Systems   Constitutional: Negative for appetite change, chills, diaphoresis, fatigue, fever and unexpected weight change  HENT: Negative for congestion, hearing loss and rhinorrhea  Eyes: Negative for visual disturbance  Respiratory: Negative for cough, chest tightness, shortness of breath and wheezing  Cardiovascular: Negative for chest pain, palpitations and leg swelling  Gastrointestinal: Negative for abdominal pain and blood in stool  Endocrine: Negative for cold intolerance, heat intolerance, polydipsia and polyuria  Genitourinary: Positive for dysuria, frequency and urgency  Negative for difficulty urinating  Musculoskeletal: Negative for arthralgias and myalgias  Skin: Positive for rash  Neurological: Negative for dizziness, weakness, light-headedness and headaches  Hematological: Does not bruise/bleed easily  Psychiatric/Behavioral: Negative for dysphoric mood and sleep disturbance  Objective:      Vitals:    11/19/19 1523   BP: 114/58   Pulse: 80   Resp: 12          Physical Exam   Constitutional: She is oriented to person, place, and time  She appears well-developed and well-nourished  HENT:   Head: Normocephalic and atraumatic  Nose: Nose normal    Mouth/Throat: Oropharynx is clear and moist    Eyes: Pupils are equal, round, and reactive to light  Conjunctivae and EOM are normal  No scleral icterus  Neck: Normal range of motion  Neck supple  No JVD present  No tracheal deviation present  No thyromegaly present  Cardiovascular: Normal rate, regular rhythm and intact distal pulses  Exam reveals no gallop and no friction rub  No murmur heard  Pulmonary/Chest: Effort normal and breath sounds normal  No respiratory distress  She has no wheezes  She has no rales  Abdominal: Soft  Bowel sounds are normal  She exhibits no distension and no mass  There is no tenderness  There is no rebound and no guarding  No cvat, no suprapubic ttp   Musculoskeletal: She exhibits no edema, tenderness or deformity  Lymphadenopathy:     She has no cervical adenopathy  Neurological: She is alert and oriented to person, place, and time  No cranial nerve deficit  Skin: Skin is warm and dry  No rash noted  No erythema  No pallor  Well-circumscribed area on the left cheek approximately 2 and 0 5 cm around that has exact shape of her Band-Aid with central scabbed lesion with minimal erythema surrounding it   Psychiatric: She has a normal mood and affect   Her behavior is normal  Judgment and thought content normal

## 2019-11-21 ENCOUNTER — TELEPHONE (OUTPATIENT)
Dept: INTERNAL MEDICINE CLINIC | Facility: CLINIC | Age: 74
End: 2019-11-21

## 2019-11-21 LAB — BACTERIA UR CULT: ABNORMAL

## 2019-11-21 NOTE — TELEPHONE ENCOUNTER
----- Message from Cleon Dakins, MD sent at 11/21/2019  1:38 PM EST -----  Urine culture shows E coli susceptible to cephalexin, how is she doing, it should be getting better

## 2019-11-21 NOTE — TELEPHONE ENCOUNTER
Patient said that when you sent in the Lexapro, you told her to split it for the first 4 days, she said its so tiny its just dust, doesn't know if she should just take the full pill

## 2019-12-02 DIAGNOSIS — E11.9 TYPE 2 DIABETES MELLITUS WITHOUT COMPLICATION, WITHOUT LONG-TERM CURRENT USE OF INSULIN (HCC): ICD-10-CM

## 2019-12-02 RX ORDER — METFORMIN HYDROCHLORIDE 500 MG/1
TABLET, EXTENDED RELEASE ORAL
Qty: 360 TABLET | Refills: 1 | Status: SHIPPED | OUTPATIENT
Start: 2019-12-02 | End: 2020-02-20

## 2019-12-07 DIAGNOSIS — M54.16 LUMBAR RADICULOPATHY: ICD-10-CM

## 2019-12-08 RX ORDER — MELOXICAM 15 MG/1
15 TABLET ORAL AS NEEDED
Qty: 30 TABLET | Refills: 5 | Status: SHIPPED | OUTPATIENT
Start: 2019-12-08 | End: 2020-06-03

## 2019-12-19 ENCOUNTER — OFFICE VISIT (OUTPATIENT)
Dept: INTERNAL MEDICINE CLINIC | Facility: CLINIC | Age: 74
End: 2019-12-19
Payer: COMMERCIAL

## 2019-12-19 VITALS
HEIGHT: 64 IN | WEIGHT: 166.2 LBS | RESPIRATION RATE: 14 BRPM | HEART RATE: 74 BPM | BODY MASS INDEX: 28.38 KG/M2 | DIASTOLIC BLOOD PRESSURE: 68 MMHG | SYSTOLIC BLOOD PRESSURE: 122 MMHG

## 2019-12-19 DIAGNOSIS — E78.2 MIXED HYPERLIPIDEMIA: ICD-10-CM

## 2019-12-19 DIAGNOSIS — E11.9 TYPE 2 DIABETES MELLITUS WITHOUT COMPLICATION, WITHOUT LONG-TERM CURRENT USE OF INSULIN (HCC): Primary | ICD-10-CM

## 2019-12-19 DIAGNOSIS — R79.0 LOW MAGNESIUM LEVEL: ICD-10-CM

## 2019-12-19 PROBLEM — F32.9 REACTIVE DEPRESSION: Status: ACTIVE | Noted: 2019-12-19

## 2019-12-19 PROCEDURE — 1160F RVW MEDS BY RX/DR IN RCRD: CPT | Performed by: NURSE PRACTITIONER

## 2019-12-19 PROCEDURE — 1036F TOBACCO NON-USER: CPT | Performed by: NURSE PRACTITIONER

## 2019-12-19 PROCEDURE — 3008F BODY MASS INDEX DOCD: CPT | Performed by: NURSE PRACTITIONER

## 2019-12-19 PROCEDURE — 99214 OFFICE O/P EST MOD 30 MIN: CPT | Performed by: NURSE PRACTITIONER

## 2019-12-19 NOTE — PATIENT INSTRUCTIONS
Depression anxiety markedly proved on low-dose Lexapro continue same but try taking it in the evening since she is noticing a lot of fatigue during the day     low magnesium cramping is improved after magnesium supplement monitor for diarrhea     hyperlipidemia she has been off of her a torus statin for the last 3 weeks to see if this was contributing to her cramping, we will restart if cramping starts Back contact us

## 2019-12-19 NOTE — PROGRESS NOTES
Assessment/Plan:    Patient Instructions    Depression anxiety markedly proved on low-dose Lexapro continue same but try taking it in the evening since she is noticing a lot of fatigue during the day     low magnesium cramping is improved after magnesium supplement monitor for diarrhea     hyperlipidemia she has been off of her a torus statin for the last 3 weeks to see if this was contributing to her cramping, we will restart if cramping starts Back contact us         Diagnoses and all orders for this visit:    Type 2 diabetes mellitus without complication, without long-term current use of insulin ([facility])    Mixed hyperlipidemia    Low magnesium level         Subjective:      Patient ID: Genet Stearns is a 76 y o  female     Patient is here to follow-up depression medication, she says that her sleep is better but not great, and she is less edgy and irritable she does notice feeling fatigued during the day but she also admits that she is taking Lexapro in the morning she is taking a whole tablet     she was experiencing a lot of cramping in her legs, she was found to have low magnesium she started on a supplement there has been no change in her diarrhea which is chronic in nature anyway    But her cramping has resolved additionally she held her  Her a torus statin wonders when she should restart        Current Outpatient Medications:     acetaminophen (TYLENOL) 325 mg tablet, Take 2 tablets (650 mg total) by mouth every 6 (six) hours as needed for mild pain, Disp: 30 tablet, Rfl: 0    amLODIPine-benazepril (LOTREL 5-10) 5-10 MG per capsule, TAKE 1 CAPSULE EVERY DAY, Disp: 90 capsule, Rfl: 3    atorvastatin (LIPITOR) 20 mg tablet, TAKE 1 TABLET DAILY, Disp: 90 tablet, Rfl: 3    clotrimazole-betamethasone (LOTRISONE) 1-0 05 % cream, Apply topically as needed , Disp: , Rfl:     escitalopram (LEXAPRO) 5 mg tablet, Take 1 tablet (5 mg total) by mouth daily, Disp: 30 tablet, Rfl: 5    Lancets ([facility] [id number]) MISC, TEST DAILY, Disp: 100 each, Rfl: 2    levothyroxine 100 mcg tablet, 1 po daily 5 days per week, Disp: 90 tablet, Rfl: 3    meloxicam (MOBIC) 15 mg tablet, Take 1 tablet (15 mg total) by mouth as needed for moderate pain, Disp: 30 tablet, Rfl: 5    metFORMIN (GLUCOPHAGE-XR) 500 mg 24 hr tablet, START WITH 1 TABLET DAILY W/ DINNER, INCREASE BY 1 TAB EACH WEEK AS TOLERATED UNTIL TAKING 4 TABS, Disp: 360 tablet, Rfl: 1    Multiple Vitamins-Minerals (CENTRUM SILVER) CHEW, Chew 1 tablet daily, Disp: , Rfl:     ONE TOUCH ULTRA TEST test strip, 1 EACH BY OTHER ROUTE DAILY USE AS INSTRUCTED, Disp: 100 each, Rfl: 2    spironolactone (ALDACTONE) 25 mg tablet, TAKE 1 TABLET DAILY, Disp: 90 tablet, Rfl: 3    valACYclovir (VALTREX) 1,000 mg tablet, Take 1 tablet (1,000 mg total) by mouth as needed (as needed) for up to 119 days, Disp: 14 tablet, Rfl: 0    Recent Results (from the past 1008 hour(s))   UA w Reflex to Microscopic w Reflex to Culture    Collection Time: 11/19/19  4:02 PM   Result Value Ref Range    Color, UA Yellow     Clarity, UA Turbid     Specific [place], UA 1 014 1 003 - 1 030    pH, UA 5 5 4 5, 5 0, 5 5, 6 0, 6 5, 7 0, 7 5, 8 0    Leukocytes, UA Large (A) Negative    Nitrite, UA Positive (A) Negative    Protein, UA 30 (1+) (A) Negative mg/dl    Glucose, UA Negative Negative mg/dl    Ketones, UA Negative Negative mg/dl    Urobilinogen, UA 0 2 0 2, 1 0 E U /dl E U /dl    Bilirubin, UA Negative Negative    Blood, UA Small (A) Negative   Magnesium    Collection Time: 11/19/19  4:02 PM   Result Value Ref Range    Magnesium 1 5 (L) 1 6 - 2 6 mg/dL   Phosphorus    Collection Time: 11/19/19  4:02 PM   Result Value Ref Range    Phosphorus 3 3 2 3 - 4 1 mg/dL   CK    Collection Time: 11/19/19  4:02 PM   Result Value Ref Range    Total  26 - 192 U/L   Basic metabolic panel    Collection Time: 11/19/19  4:02 PM   Result Value Ref Range    Sodium 141 136 - 145 mmol/L    Potassium 4 6 3 5 - 5 3 mmol/L    Chloride 108 100 - 108 mmol/L    CO2 24 21 - 32 mmol/L    ANION GAP 9 4 - 13 mmol/L    BUN 21 5 - 25 mg/dL    Creatinine 0 93 0 60 - 1 30 mg/dL    Glucose 113 65 - 140 mg/dL    Calcium 9 7 8 3 - 10 1 mg/dL    eGFR 61 ml/min/1 73sq m   Urine Microscopic    Collection Time: 11/19/19  4:02 PM   Result Value Ref Range    RBC, UA None Seen None Seen, 0-5 /hpf    WBC, UA Innumerable (A) None Seen, 0-5, 5-55, 5-65 /hpf    Epithelial Cells Moderate (A) None Seen, Occasional /hpf    Bacteria, UA Innumerable (A) None Seen, Occasional /hpf   Urine culture    Collection Time: 11/19/19  4:02 PM   Result Value Ref Range    Urine Culture >100,000 cfu/ml Escherichia coli (A)        Susceptibility    Escherichia coli - ERIN     ZID Performed Yes       Ampicillin ($$) <=8 00 Susceptible ug/ml     Aztreonam ($$$)  <=4 Susceptible ug/ml     Cefazolin ($) <=2 00 Susceptible ug/ml     Ciprofloxacin ($)  <=1 00 Susceptible ug/ml     Gentamicin ($$) <=1 Susceptible ug/ml     Levofloxacin ($) <=0 25 Susceptible ug/ml     Nitrofurantoin <=32 Susceptible ug/ml     Tetracycline <=4 Susceptible ug/ml     Tobramycin ($) <=1 Susceptible ug/ml     Trimethoprim + Sulfamethoxazole ($$$) <=2/38 Susceptible ug/ml       The following portions of the patient's history were reviewed and updated as appropriate: allergies, current medications, past family history, past medical history, past social history, past surgical history and problem list      Review of Systems   Constitutional: Negative for appetite change, chills, diaphoresis, fatigue, fever and unexpected weight change  HENT: Negative for postnasal drip and sneezing  Eyes: Negative for visual disturbance  Respiratory: Negative for chest tightness and shortness of breath  Cardiovascular: Negative for chest pain, palpitations and leg swelling  Gastrointestinal: Negative for abdominal pain and blood in stool     Endocrine: Negative for cold intolerance, heat intolerance, polydipsia, polyphagia and polyuria  Genitourinary: Negative for difficulty urinating, dysuria, frequency and urgency  Musculoskeletal: Negative for arthralgias and myalgias  Skin: Negative for rash and wound  Neurological: Negative for dizziness, weakness, light-headedness and headaches  Hematological: Negative for adenopathy  Psychiatric/Behavioral: Negative for confusion, dysphoric mood and sleep disturbance  The patient is not nervous/anxious  Objective:      /68 (BP Location: Left arm, Patient Position: Sitting, Cuff Size: Standard)   Pulse 74   Resp 14   Ht 5' 4" (1 626 m)   Wt 75 4 kg (166 lb 3 2 oz)   BMI 28 53 kg/m²        Physical Exam   Constitutional: She is oriented to person, place, and time  She appears well-developed  No distress  HENT:   Head: Normocephalic and atraumatic  Nose: Nose normal    Mouth/Throat: Oropharynx is clear and moist    Eyes: Pupils are equal, round, and reactive to light  Conjunctivae and EOM are normal    Neck: Normal range of motion  Neck supple  No JVD present  No tracheal deviation present  No thyromegaly present  Cardiovascular: Normal rate, regular rhythm, normal heart sounds and intact distal pulses  Exam reveals no gallop and no friction rub  No murmur heard  Pulmonary/Chest: Effort normal and breath sounds normal  No respiratory distress  She has no wheezes  She has no rales  Abdominal: Soft  Bowel sounds are normal  She exhibits no distension  There is no tenderness  Musculoskeletal: Normal range of motion  She exhibits no edema  Lymphadenopathy:     She has no cervical adenopathy  Neurological: She is alert and oriented to person, place, and time  No cranial nerve deficit  Skin: Skin is warm and dry  No rash noted  She is not diaphoretic  Psychiatric: She has a normal mood and affect   Her behavior is normal  Judgment and thought content normal

## 2020-01-02 ENCOUNTER — OFFICE VISIT (OUTPATIENT)
Dept: INTERNAL MEDICINE CLINIC | Facility: CLINIC | Age: 75
End: 2020-01-02
Payer: COMMERCIAL

## 2020-01-02 VITALS
HEIGHT: 64 IN | WEIGHT: 164.4 LBS | TEMPERATURE: 98 F | DIASTOLIC BLOOD PRESSURE: 60 MMHG | SYSTOLIC BLOOD PRESSURE: 126 MMHG | BODY MASS INDEX: 28.07 KG/M2 | HEART RATE: 72 BPM

## 2020-01-02 DIAGNOSIS — J06.9 UPPER RESPIRATORY TRACT INFECTION, UNSPECIFIED TYPE: Primary | ICD-10-CM

## 2020-01-02 PROCEDURE — 1036F TOBACCO NON-USER: CPT | Performed by: NURSE PRACTITIONER

## 2020-01-02 PROCEDURE — 1160F RVW MEDS BY RX/DR IN RCRD: CPT | Performed by: NURSE PRACTITIONER

## 2020-01-02 PROCEDURE — 99213 OFFICE O/P EST LOW 20 MIN: CPT | Performed by: NURSE PRACTITIONER

## 2020-01-02 RX ORDER — LANCETS 30 GAUGE
EACH MISCELLANEOUS
COMMUNITY
Start: 2019-11-04 | End: 2020-06-08

## 2020-01-02 RX ORDER — MULTIVITAMIN WITH IRON
1 TABLET ORAL DAILY
COMMUNITY
End: 2021-01-13

## 2020-01-02 NOTE — PROGRESS NOTES
Assessment/Plan:    Patient Instructions   Upper respiratory infection- no indication for antibioitcs  Recommend increasing fluids, rest, tylenol and/or motrin as needed, humidifcation  I expect you will continue with some degree of symptoms for the next 5-7 days but if symptoms are not improving contact us  Ok to use tylenol severe cold daytime and nighttime  Diagnoses and all orders for this visit:    Upper respiratory tract infection, unspecified type    Other orders  -     ONETOUCH DELICA LANCETS 03L MISC; TEST DAILY  -     Magnesium 250 MG TABS; Take 1 tablet by mouth daily         Subjective:      Patient ID: Francisco Santana is a 76 y o  female    Patient has not felt well since last weekend  On Friday she started with minor symptoms, then on Saturday and Sunday she had full-blown nasal congestion postnasal drip runny nose cough some shortness of breath occasional wheeze no fevers chills feels fatigued, taking Tylenol over-the-counter          Current Outpatient Medications:     acetaminophen (TYLENOL) 325 mg tablet, Take 2 tablets (650 mg total) by mouth every 6 (six) hours as needed for mild pain, Disp: 30 tablet, Rfl: 0    amLODIPine-benazepril (LOTREL 5-10) 5-10 MG per capsule, TAKE 1 CAPSULE EVERY DAY, Disp: 90 capsule, Rfl: 3    clotrimazole-betamethasone (LOTRISONE) 1-0 05 % cream, Apply topically as needed , Disp: , Rfl:     escitalopram (LEXAPRO) 5 mg tablet, Take 1 tablet (5 mg total) by mouth daily, Disp: 30 tablet, Rfl: 5    Lancets (ONETOUCH DELICA PLUS [id number]) MISC, TEST DAILY, Disp: 100 each, Rfl: 2    levothyroxine 100 mcg tablet, 1 po daily 5 days per week, Disp: 90 tablet, Rfl: 3    Magnesium 250 MG TABS, Take 1 tablet by mouth daily, Disp: , Rfl:     meloxicam (MOBIC) 15 mg tablet, Take 1 tablet (15 mg total) by mouth as needed for moderate pain, Disp: 30 tablet, Rfl: 5    metFORMIN (GLUCOPHAGE-XR) 500 mg 24 hr tablet, START WITH 1 TABLET DAILY W/ DINNER, INCREASE BY 1 TAB EACH WEEK AS TOLERATED UNTIL TAKING 4 TABS, Disp: 360 tablet, Rfl: 1    Multiple Vitamins-Minerals (CENTRUM SILVER) CHEW, Chew 1 tablet daily, Disp: , Rfl:     ONE TOUCH ULTRA TEST test strip, 1 EACH BY OTHER ROUTE DAILY USE AS INSTRUCTED, Disp: 100 each, Rfl: 2    spironolactone (ALDACTONE) 25 mg tablet, TAKE 1 TABLET DAILY, Disp: 90 tablet, Rfl: 3    ONETOUCH DELICA LANCETS 53B MISC, TEST DAILY, Disp: , Rfl:     valACYclovir (VALTREX) 1,000 mg tablet, Take 1 tablet (1,000 mg total) by mouth as needed (as needed) for up to 119 days, Disp: 14 tablet, Rfl: 0    No results found for this or any previous visit (from the past 1008 hour(s))  The following portions of the patient's history were reviewed and updated as appropriate: allergies, current medications, past family history, past medical history, past social history, past surgical history and problem list      Review of Systems   Constitutional: Positive for appetite change, chills and fatigue  HENT: Positive for congestion, postnasal drip and sinus pressure  Respiratory: Positive for cough, shortness of breath and wheezing  Cardiovascular: Negative for chest pain and leg swelling  Gastrointestinal: Negative for abdominal pain and nausea  Endocrine: Negative  Genitourinary: Negative for difficulty urinating  Musculoskeletal: Positive for arthralgias and myalgias  Allergic/Immunologic: Negative  Neurological: Positive for weakness and headaches  Negative for dizziness  Objective:      /60   Pulse 72   Temp 98 °F (36 7 °C)   Ht 5' 4" (1 626 m)   Wt 74 6 kg (164 lb 6 4 oz)   BMI 28 22 kg/m²        Physical Exam   Constitutional: She is oriented to person, place, and time  She appears well-developed  No distress  HENT:   Head: Normocephalic and atraumatic  Nose: Nose normal    Mouth/Throat: Oropharynx is clear and moist    Eyes: Pupils are equal, round, and reactive to light   Conjunctivae and EOM are normal  Neck: Normal range of motion  Neck supple  No JVD present  No tracheal deviation present  No thyromegaly present  Cardiovascular: Normal rate, regular rhythm, normal heart sounds and intact distal pulses  Exam reveals no gallop and no friction rub  No murmur heard  Pulmonary/Chest: Effort normal and breath sounds normal  No respiratory distress  She has no wheezes  She has no rales  Abdominal: Soft  Bowel sounds are normal  She exhibits no distension  There is no tenderness  Musculoskeletal: Normal range of motion  She exhibits no edema  Lymphadenopathy:     She has no cervical adenopathy  Neurological: She is alert and oriented to person, place, and time  No cranial nerve deficit  Skin: Skin is warm and dry  No rash noted  She is not diaphoretic  Psychiatric: She has a normal mood and affect  Her behavior is normal  Judgment and thought content normal    BMI Counseling: Body mass index is 28 22 kg/m²  The BMI is above normal  Nutrition recommendations include decreasing portion sizes and limiting drinks that contain sugar  Exercise recommendations include exercising 3-5 times per week  No pharmacotherapy was ordered

## 2020-01-02 NOTE — PATIENT INSTRUCTIONS
Upper respiratory infection- no indication for antibioitcs  Recommend increasing fluids, rest, tylenol and/or motrin as needed, humidifcation  I expect you will continue with some degree of symptoms for the next 5-7 days but if symptoms are not improving contact us  Ok to use tylenol severe cold daytime and nighttime

## 2020-02-17 ENCOUNTER — APPOINTMENT (OUTPATIENT)
Dept: LAB | Facility: HOSPITAL | Age: 75
End: 2020-02-17
Attending: INTERNAL MEDICINE
Payer: COMMERCIAL

## 2020-02-17 DIAGNOSIS — E78.2 MIXED HYPERLIPIDEMIA: ICD-10-CM

## 2020-02-17 DIAGNOSIS — I10 ESSENTIAL HYPERTENSION: ICD-10-CM

## 2020-02-17 DIAGNOSIS — E11.9 TYPE 2 DIABETES MELLITUS WITHOUT COMPLICATION, WITHOUT LONG-TERM CURRENT USE OF INSULIN (HCC): ICD-10-CM

## 2020-02-17 DIAGNOSIS — E03.9 ACQUIRED HYPOTHYROIDISM: ICD-10-CM

## 2020-02-17 LAB
ALBUMIN SERPL BCP-MCNC: 4 G/DL (ref 3.5–5)
ALP SERPL-CCNC: 42 U/L (ref 46–116)
ALT SERPL W P-5'-P-CCNC: 33 U/L (ref 12–78)
ANION GAP SERPL CALCULATED.3IONS-SCNC: 9 MMOL/L (ref 4–13)
AST SERPL W P-5'-P-CCNC: 22 U/L (ref 5–45)
BASOPHILS # BLD AUTO: 0.08 THOUSANDS/ΜL (ref 0–0.1)
BASOPHILS NFR BLD AUTO: 1 % (ref 0–1)
BILIRUB SERPL-MCNC: 0.4 MG/DL (ref 0.2–1)
BUN SERPL-MCNC: 21 MG/DL (ref 5–25)
CALCIUM SERPL-MCNC: 9 MG/DL (ref 8.3–10.1)
CHLORIDE SERPL-SCNC: 103 MMOL/L (ref 100–108)
CHOLEST SERPL-MCNC: 120 MG/DL (ref 50–200)
CO2 SERPL-SCNC: 28 MMOL/L (ref 21–32)
CREAT SERPL-MCNC: 0.97 MG/DL (ref 0.6–1.3)
CREAT UR-MCNC: 143 MG/DL
EOSINOPHIL # BLD AUTO: 0.18 THOUSAND/ΜL (ref 0–0.61)
EOSINOPHIL NFR BLD AUTO: 2 % (ref 0–6)
ERYTHROCYTE [DISTWIDTH] IN BLOOD BY AUTOMATED COUNT: 12.4 % (ref 11.6–15.1)
EST. AVERAGE GLUCOSE BLD GHB EST-MCNC: 128 MG/DL
GFR SERPL CREATININE-BSD FRML MDRD: 58 ML/MIN/1.73SQ M
GLUCOSE P FAST SERPL-MCNC: 129 MG/DL (ref 65–99)
HBA1C MFR BLD: 6.1 %
HCT VFR BLD AUTO: 42.3 % (ref 34.8–46.1)
HDLC SERPL-MCNC: 49 MG/DL
HGB BLD-MCNC: 13.6 G/DL (ref 11.5–15.4)
IMM GRANULOCYTES # BLD AUTO: 0.03 THOUSAND/UL (ref 0–0.2)
IMM GRANULOCYTES NFR BLD AUTO: 0 % (ref 0–2)
LDLC SERPL CALC-MCNC: 44 MG/DL (ref 0–100)
LYMPHOCYTES # BLD AUTO: 2.1 THOUSANDS/ΜL (ref 0.6–4.47)
LYMPHOCYTES NFR BLD AUTO: 28 % (ref 14–44)
MCH RBC QN AUTO: 32.8 PG (ref 26.8–34.3)
MCHC RBC AUTO-ENTMCNC: 32.2 G/DL (ref 31.4–37.4)
MCV RBC AUTO: 102 FL (ref 82–98)
MICROALBUMIN UR-MCNC: 27.8 MG/L (ref 0–20)
MICROALBUMIN/CREAT 24H UR: 19 MG/G CREATININE (ref 0–30)
MONOCYTES # BLD AUTO: 0.57 THOUSAND/ΜL (ref 0.17–1.22)
MONOCYTES NFR BLD AUTO: 8 % (ref 4–12)
NEUTROPHILS # BLD AUTO: 4.56 THOUSANDS/ΜL (ref 1.85–7.62)
NEUTS SEG NFR BLD AUTO: 61 % (ref 43–75)
NONHDLC SERPL-MCNC: 71 MG/DL
NRBC BLD AUTO-RTO: 0 /100 WBCS
PLATELET # BLD AUTO: 287 THOUSANDS/UL (ref 149–390)
PMV BLD AUTO: 9.5 FL (ref 8.9–12.7)
POTASSIUM SERPL-SCNC: 4.1 MMOL/L (ref 3.5–5.3)
PROT SERPL-MCNC: 7.9 G/DL (ref 6.4–8.2)
RBC # BLD AUTO: 4.15 MILLION/UL (ref 3.81–5.12)
SODIUM SERPL-SCNC: 140 MMOL/L (ref 136–145)
TRIGL SERPL-MCNC: 134 MG/DL
TSH SERPL DL<=0.05 MIU/L-ACNC: 1.36 UIU/ML (ref 0.36–3.74)
WBC # BLD AUTO: 7.52 THOUSAND/UL (ref 4.31–10.16)

## 2020-02-17 PROCEDURE — 83036 HEMOGLOBIN GLYCOSYLATED A1C: CPT

## 2020-02-17 PROCEDURE — 3061F NEG MICROALBUMINURIA REV: CPT | Performed by: INTERNAL MEDICINE

## 2020-02-17 PROCEDURE — 80061 LIPID PANEL: CPT

## 2020-02-17 PROCEDURE — 82570 ASSAY OF URINE CREATININE: CPT

## 2020-02-17 PROCEDURE — 85025 COMPLETE CBC W/AUTO DIFF WBC: CPT

## 2020-02-17 PROCEDURE — 84443 ASSAY THYROID STIM HORMONE: CPT

## 2020-02-17 PROCEDURE — 82043 UR ALBUMIN QUANTITATIVE: CPT

## 2020-02-17 PROCEDURE — 36415 COLL VENOUS BLD VENIPUNCTURE: CPT

## 2020-02-17 PROCEDURE — 80053 COMPREHEN METABOLIC PANEL: CPT

## 2020-02-20 ENCOUNTER — OFFICE VISIT (OUTPATIENT)
Dept: INTERNAL MEDICINE CLINIC | Facility: CLINIC | Age: 75
End: 2020-02-20
Payer: COMMERCIAL

## 2020-02-20 VITALS
RESPIRATION RATE: 12 BRPM | BODY MASS INDEX: 28.71 KG/M2 | HEIGHT: 64 IN | SYSTOLIC BLOOD PRESSURE: 126 MMHG | HEART RATE: 70 BPM | WEIGHT: 168.2 LBS | DIASTOLIC BLOOD PRESSURE: 78 MMHG

## 2020-02-20 DIAGNOSIS — M54.16 LUMBAR RADICULOPATHY: ICD-10-CM

## 2020-02-20 DIAGNOSIS — F32.9 REACTIVE DEPRESSION: ICD-10-CM

## 2020-02-20 DIAGNOSIS — E11.9 TYPE 2 DIABETES MELLITUS WITHOUT COMPLICATION, WITHOUT LONG-TERM CURRENT USE OF INSULIN (HCC): ICD-10-CM

## 2020-02-20 DIAGNOSIS — E78.2 MIXED HYPERLIPIDEMIA: ICD-10-CM

## 2020-02-20 DIAGNOSIS — E03.9 ACQUIRED HYPOTHYROIDISM: Primary | ICD-10-CM

## 2020-02-20 DIAGNOSIS — I10 ESSENTIAL HYPERTENSION: ICD-10-CM

## 2020-02-20 PROCEDURE — 1101F PT FALLS ASSESS-DOCD LE1/YR: CPT | Performed by: INTERNAL MEDICINE

## 2020-02-20 PROCEDURE — 99214 OFFICE O/P EST MOD 30 MIN: CPT | Performed by: INTERNAL MEDICINE

## 2020-02-20 PROCEDURE — 1160F RVW MEDS BY RX/DR IN RCRD: CPT | Performed by: INTERNAL MEDICINE

## 2020-02-20 PROCEDURE — 3288F FALL RISK ASSESSMENT DOCD: CPT | Performed by: INTERNAL MEDICINE

## 2020-02-20 PROCEDURE — 3078F DIAST BP <80 MM HG: CPT | Performed by: INTERNAL MEDICINE

## 2020-02-20 PROCEDURE — 4040F PNEUMOC VAC/ADMIN/RCVD: CPT | Performed by: INTERNAL MEDICINE

## 2020-02-20 PROCEDURE — 3008F BODY MASS INDEX DOCD: CPT | Performed by: INTERNAL MEDICINE

## 2020-02-20 PROCEDURE — 3044F HG A1C LEVEL LT 7.0%: CPT | Performed by: INTERNAL MEDICINE

## 2020-02-20 PROCEDURE — 3060F POS MICROALBUMINURIA REV: CPT | Performed by: INTERNAL MEDICINE

## 2020-02-20 PROCEDURE — 3074F SYST BP LT 130 MM HG: CPT | Performed by: INTERNAL MEDICINE

## 2020-02-20 PROCEDURE — 1036F TOBACCO NON-USER: CPT | Performed by: INTERNAL MEDICINE

## 2020-02-20 RX ORDER — ATORVASTATIN CALCIUM 10 MG/1
10 TABLET, FILM COATED ORAL DAILY
Qty: 90 TABLET | Refills: 3 | Status: SHIPPED | OUTPATIENT
Start: 2020-02-20 | End: 2021-02-07

## 2020-02-20 RX ORDER — METFORMIN HYDROCHLORIDE 500 MG/1
TABLET, EXTENDED RELEASE ORAL
Qty: 270 TABLET | Refills: 1
Start: 2020-02-20 | End: 2020-05-29

## 2020-02-20 RX ORDER — ATORVASTATIN CALCIUM 10 MG/1
10 TABLET, FILM COATED ORAL DAILY
COMMUNITY
End: 2020-02-20 | Stop reason: SDUPTHER

## 2020-02-20 NOTE — PATIENT INSTRUCTIONS
Lab data reviewed in detail and compared to prior    Diabetes under excellent control on metformin though she is having diarrhea with 2000 mg per day, I have advised she go down to 1500 mg per day and further decreased to a 1000 mg per day if needed to resolve the diarrhea issue  Contact me if blood sugars running greater than 170s    Hypertension and hyperlipidemia remained optimally controlled on present regimen    Reactive depression and insomnia stable with Lexapro    Hypothyroidism stable on levothyroxine    Routine follow-up after labs in 6 months, sooner as needed

## 2020-02-20 NOTE — PROGRESS NOTES
Assessment/Plan:    Diagnoses and all orders for this visit:    Acquired hypothyroidism    Type 2 diabetes mellitus without complication, without long-term current use of insulin (HCC)  -     metFORMIN (GLUCOPHAGE-XR) 500 mg 24 hr tablet; 3 tabs daily w/ dinner  -     Hemoglobin A1C; Future    Essential hypertension  -     TSH, 3rd generation with Free T4 reflex; Future    Lumbar radiculopathy    Mixed hyperlipidemia  -     atorvastatin (LIPITOR) 10 mg tablet; Take 1 tablet (10 mg total) by mouth daily  -     CBC and differential; Future  -     Comprehensive metabolic panel; Future  -     Lipid panel; Future    Reactive depression    Other orders  -     Discontinue: atorvastatin (LIPITOR) 10 mg tablet; Take 10 mg by mouth daily         Patient Instructions   Lab data reviewed in detail and compared to prior    Diabetes under excellent control on metformin though she is having diarrhea with 2000 mg per day, I have advised she go down to 1500 mg per day and further decreased to a 1000 mg per day if needed to resolve the diarrhea issue  Contact me if blood sugars running greater than 170s    Hypertension and hyperlipidemia remained optimally controlled on present regimen    Reactive depression and insomnia stable with Lexapro    Hypothyroidism stable on levothyroxine    Routine follow-up after labs in 6 months, sooner as needed  Subjective:      Patient ID: Art Thompson is a 76 y o  female    F/u mmp and review labs  Feeling generally well  Lumbar radiculopathy stable, not using any mobic lately  Leg cramps resolved w/ magnesium supplement  HTN/HPL-taking rx as directed, home bp's have been stable  DM- Taking metformin as directed  Am sugars increased lately to 130's  OAB-urination much better since surgery, still w/  2x nocturia  Hypothyroid taking rx as directed  Still having loose stool    [place] was unremarkable        Current Outpatient Medications:     acetaminophen (TYLENOL) 325 mg tablet, Take 2 tablets (650 mg total) by mouth every 6 (six) hours as needed for mild pain, Disp: 30 tablet, Rfl: 0    amLODIPine-benazepril (LOTREL 5-10) 5-10 MG per capsule, TAKE 1 CAPSULE EVERY DAY, Disp: 90 capsule, Rfl: 3    atorvastatin (LIPITOR) 10 mg tablet, Take 1 tablet (10 mg total) by mouth daily, Disp: 90 tablet, Rfl: 3    escitalopram (LEXAPRO) 5 mg tablet, Take 1 tablet (5 mg total) by mouth daily, Disp: 30 tablet, Rfl: 5    Lancets (ONETOUCH DELICA PLUS [id number]) MISC, TEST DAILY, Disp: 100 each, Rfl: 2    levothyroxine 100 mcg tablet, 1 po daily 5 days per week, Disp: 90 tablet, Rfl: 3    Magnesium 250 MG TABS, Take 1 tablet by mouth daily, Disp: , Rfl:     meloxicam (MOBIC) 15 mg tablet, Take 1 tablet (15 mg total) by mouth as needed for moderate pain, Disp: 30 tablet, Rfl: 5    metFORMIN (GLUCOPHAGE-XR) 500 mg 24 hr tablet, 3 tabs daily w/ dinner, Disp: 270 tablet, Rfl: 1    Multiple Vitamins-Minerals (CENTRUM SILVER) CHEW, Chew 1 tablet daily, Disp: , Rfl:     ONE TOUCH ULTRA TEST test strip, 1 EACH BY OTHER ROUTE DAILY USE AS INSTRUCTED, Disp: 100 each, Rfl: 2    ONETOUCH DELICA LANCETS 35F MISC, TEST DAILY, Disp: , Rfl:     spironolactone (ALDACTONE) 25 mg tablet, TAKE 1 TABLET DAILY, Disp: 90 tablet, Rfl: 3    valACYclovir (VALTREX) 1,000 mg tablet, Take 1 tablet (1,000 mg total) by mouth as needed (as needed) for up to 119 days, Disp: 14 tablet, Rfl: 0    Recent Results (from the past 1008 hour(s))   Microalbumin / creatinine urine ratio    Collection Time: 02/17/20  9:48 AM   Result Value Ref Range    Creatinine, Ur 143 0 mg/dL    Microalbum  ,U,Random 27 8 (H) 0 0 - 20 0 mg/L    Microalb Creat Ratio 19 0 - 30 mg/g creatinine   CBC and differential    Collection Time: 02/17/20  9:48 AM   Result Value Ref Range    WBC 7 52 4 31 - 10 16 Thousand/uL    RBC 4 15 3 81 - 5 12 Million/uL    Hemoglobin 13 6 11 5 - 15 4 g/dL    Hematocrit 42 3 34 8 - 46 1 %     (H) 82 - 98 fL    MCH 32 8 26 8 - 34 3 pg    MCHC 32 2 31 4 - 37 4 g/dL    RDW 12 4 11 6 - 15 1 %    MPV 9 5 8 9 - 12 7 fL    Platelets 596 938 - 133 Thousands/uL    nRBC 0 /100 WBCs    Neutrophils Relative 61 43 - 75 %    Immat GRANS % 0 0 - 2 %    Lymphocytes Relative 28 14 - 44 %    Monocytes Relative 8 4 - 12 %    Eosinophils Relative 2 0 - 6 %    Basophils Relative 1 0 - 1 %    Neutrophils Absolute 4 56 1 85 - 7 62 Thousands/µL    Immature Grans Absolute 0 03 0 00 - 0 20 Thousand/uL    Lymphocytes Absolute 2 10 0 60 - 4 47 Thousands/µL    Monocytes Absolute 0 57 0 17 - 1 22 Thousand/µL    Eosinophils Absolute 0 18 0 00 - 0 61 Thousand/µL    Basophils Absolute 0 08 0 00 - 0 10 Thousands/µL   Comprehensive metabolic panel    Collection Time: 02/17/20  9:48 AM   Result Value Ref Range    Sodium 140 136 - 145 mmol/L    Potassium 4 1 3 5 - 5 3 mmol/L    Chloride 103 100 - 108 mmol/L    CO2 28 21 - 32 mmol/L    ANION GAP 9 4 - 13 mmol/L    BUN 21 5 - 25 mg/dL    Creatinine 0 97 0 60 - 1 30 mg/dL    Glucose, Fasting 129 (H) 65 - 99 mg/dL    Calcium 9 0 8 3 - 10 1 mg/dL    AST 22 5 - 45 U/L    ALT 33 12 - 78 U/L    Alkaline Phosphatase 42 (L) 46 - 116 U/L    Total Protein 7 9 6 4 - 8 2 g/dL    Albumin 4 0 3 5 - 5 0 g/dL    Total Bilirubin 0 40 0 20 - 1 00 mg/dL    eGFR 58 ml/min/1 73sq m   Lipid panel    Collection Time: 02/17/20  9:48 AM   Result Value Ref Range    Cholesterol 120 50 - 200 mg/dL    Triglycerides 134 <=150 mg/dL    HDL, Direct 49 >=40 mg/dL    LDL Calculated 44 0 - 100 mg/dL    Non-HDL-Chol (CHOL-HDL) 71 mg/dl   Hemoglobin A1C    Collection Time: 02/17/20  9:48 AM   Result Value Ref Range    Hemoglobin A1C 6 1 (H) Normal 3 8-5 6%; PreDiabetic 5 7-6 4%;  Diabetic >=6 5%; Glycemic control for adults with diabetes <7 0% %     mg/dl   TSH, 3rd generation with Free T4 reflex    Collection Time: 02/17/20  9:48 AM   Result Value Ref Range    TSH 3RD GENERATON 1 359 0 358 - 3 740 uIU/mL       The following portions of the patient's history were reviewed and updated as appropriate: allergies, current medications, past family history, past medical history, past social history, past surgical history and problem list      Review of Systems   Constitutional: Negative for appetite change, chills, diaphoresis, fatigue, fever and unexpected weight change  HENT: Negative for congestion, hearing loss and rhinorrhea  Eyes: Negative for visual disturbance  Respiratory: Negative for cough, chest tightness, shortness of breath and wheezing  Cardiovascular: Negative for chest pain, palpitations and leg swelling  Gastrointestinal: Negative for abdominal pain and blood in stool  Endocrine: Negative for cold intolerance, heat intolerance, polydipsia and polyuria  Genitourinary: Negative for difficulty urinating, dysuria, frequency and urgency  Musculoskeletal: Negative for arthralgias and myalgias  Skin: Negative for rash  Neurological: Negative for dizziness, weakness, light-headedness and headaches  Hematological: Does not bruise/bleed easily  Psychiatric/Behavioral: Positive for sleep disturbance  Negative for dysphoric mood  Objective:      Vitals:    02/20/20 1426   BP: 126/78   Pulse: 70   Resp: 12          Physical Exam   Constitutional: She is oriented to person, place, and time  She appears well-developed and well-nourished  HENT:   Head: Normocephalic and atraumatic  Nose: Nose normal    Mouth/Throat: Oropharynx is clear and moist    Eyes: Pupils are equal, round, and reactive to light  Conjunctivae and EOM are normal  No scleral icterus  Neck: Normal range of motion  Neck supple  No JVD present  No tracheal deviation present  No thyromegaly present  Cardiovascular: Normal rate, regular rhythm and intact distal pulses  Exam reveals no gallop and no friction rub  No murmur heard  Pulmonary/Chest: Effort normal and breath sounds normal  No respiratory distress  She has no wheezes  She has no rales  Musculoskeletal: She exhibits no edema or deformity  Lymphadenopathy:     She has no cervical adenopathy  Neurological: She is alert and oriented to person, place, and time  No cranial nerve deficit  Skin: Skin is warm and dry  No rash noted  No erythema  No pallor  Psychiatric: She has a normal mood and affect   Her behavior is normal  Judgment and thought content normal

## 2020-05-13 DIAGNOSIS — F32.9 REACTIVE DEPRESSION: ICD-10-CM

## 2020-05-13 RX ORDER — ESCITALOPRAM OXALATE 5 MG/1
TABLET ORAL
Qty: 90 TABLET | Refills: 1 | Status: SHIPPED | OUTPATIENT
Start: 2020-05-13 | End: 2020-11-09

## 2020-05-29 DIAGNOSIS — E11.9 TYPE 2 DIABETES MELLITUS WITHOUT COMPLICATION, WITHOUT LONG-TERM CURRENT USE OF INSULIN (HCC): ICD-10-CM

## 2020-05-29 RX ORDER — METFORMIN HYDROCHLORIDE 500 MG/1
TABLET, EXTENDED RELEASE ORAL
Qty: 360 TABLET | Refills: 1 | Status: SHIPPED | OUTPATIENT
Start: 2020-05-29 | End: 2020-08-13

## 2020-06-03 DIAGNOSIS — M54.16 LUMBAR RADICULOPATHY: ICD-10-CM

## 2020-06-03 RX ORDER — MELOXICAM 15 MG/1
15 TABLET ORAL AS NEEDED
Qty: 90 TABLET | Refills: 1 | Status: SHIPPED | OUTPATIENT
Start: 2020-06-03 | End: 2020-12-22

## 2020-06-08 DIAGNOSIS — E11.9 TYPE 2 DIABETES MELLITUS WITHOUT COMPLICATION, WITHOUT LONG-TERM CURRENT USE OF INSULIN (HCC): Primary | ICD-10-CM

## 2020-06-08 RX ORDER — LANCETS 30 GAUGE
EACH MISCELLANEOUS
Qty: 100 EACH | Refills: 2 | Status: SHIPPED | OUTPATIENT
Start: 2020-06-08

## 2020-06-24 DIAGNOSIS — E03.9 ACQUIRED HYPOTHYROIDISM: ICD-10-CM

## 2020-06-24 DIAGNOSIS — I10 ESSENTIAL HYPERTENSION: ICD-10-CM

## 2020-06-24 RX ORDER — SPIRONOLACTONE 25 MG/1
TABLET ORAL
Qty: 90 TABLET | Refills: 3 | Status: SHIPPED | OUTPATIENT
Start: 2020-06-24 | End: 2021-06-16

## 2020-06-24 RX ORDER — AMLODIPINE BESYLATE AND BENAZEPRIL HYDROCHLORIDE 5; 10 MG/1; MG/1
CAPSULE ORAL
Qty: 90 CAPSULE | Refills: 3 | Status: SHIPPED | OUTPATIENT
Start: 2020-06-24 | End: 2021-06-16

## 2020-06-25 RX ORDER — LEVOTHYROXINE SODIUM 0.1 MG/1
TABLET ORAL
Qty: 90 TABLET | Refills: 3 | Status: SHIPPED | OUTPATIENT
Start: 2020-06-25 | End: 2021-06-17

## 2020-08-04 ENCOUNTER — APPOINTMENT (OUTPATIENT)
Dept: LAB | Facility: HOSPITAL | Age: 75
End: 2020-08-04
Attending: INTERNAL MEDICINE
Payer: COMMERCIAL

## 2020-08-04 DIAGNOSIS — E78.2 MIXED HYPERLIPIDEMIA: ICD-10-CM

## 2020-08-04 DIAGNOSIS — E11.9 TYPE 2 DIABETES MELLITUS WITHOUT COMPLICATION, WITHOUT LONG-TERM CURRENT USE OF INSULIN (HCC): ICD-10-CM

## 2020-08-04 DIAGNOSIS — I10 ESSENTIAL HYPERTENSION: ICD-10-CM

## 2020-08-04 LAB
ALBUMIN SERPL BCP-MCNC: 3.9 G/DL (ref 3.5–5)
ALP SERPL-CCNC: 48 U/L (ref 46–116)
ALT SERPL W P-5'-P-CCNC: 39 U/L (ref 12–78)
ANION GAP SERPL CALCULATED.3IONS-SCNC: 10 MMOL/L (ref 4–13)
AST SERPL W P-5'-P-CCNC: 22 U/L (ref 5–45)
BASOPHILS # BLD AUTO: 0.05 THOUSANDS/ΜL (ref 0–0.1)
BASOPHILS NFR BLD AUTO: 1 % (ref 0–1)
BILIRUB SERPL-MCNC: 0.3 MG/DL (ref 0.2–1)
BUN SERPL-MCNC: 24 MG/DL (ref 5–25)
CALCIUM SERPL-MCNC: 9 MG/DL (ref 8.3–10.1)
CHLORIDE SERPL-SCNC: 105 MMOL/L (ref 100–108)
CHOLEST SERPL-MCNC: 96 MG/DL (ref 50–200)
CO2 SERPL-SCNC: 26 MMOL/L (ref 21–32)
CREAT SERPL-MCNC: 1.07 MG/DL (ref 0.6–1.3)
EOSINOPHIL # BLD AUTO: 0.21 THOUSAND/ΜL (ref 0–0.61)
EOSINOPHIL NFR BLD AUTO: 3 % (ref 0–6)
ERYTHROCYTE [DISTWIDTH] IN BLOOD BY AUTOMATED COUNT: 12.3 % (ref 11.6–15.1)
EST. AVERAGE GLUCOSE BLD GHB EST-MCNC: 148 MG/DL
GFR SERPL CREATININE-BSD FRML MDRD: 51 ML/MIN/1.73SQ M
GLUCOSE P FAST SERPL-MCNC: 142 MG/DL (ref 65–99)
HBA1C MFR BLD: 6.8 %
HCT VFR BLD AUTO: 39.6 % (ref 34.8–46.1)
HDLC SERPL-MCNC: 42 MG/DL
HGB BLD-MCNC: 12.9 G/DL (ref 11.5–15.4)
IMM GRANULOCYTES # BLD AUTO: 0.02 THOUSAND/UL (ref 0–0.2)
IMM GRANULOCYTES NFR BLD AUTO: 0 % (ref 0–2)
LDLC SERPL CALC-MCNC: 35 MG/DL (ref 0–100)
LYMPHOCYTES # BLD AUTO: 2.09 THOUSANDS/ΜL (ref 0.6–4.47)
LYMPHOCYTES NFR BLD AUTO: 27 % (ref 14–44)
MCH RBC QN AUTO: 32.6 PG (ref 26.8–34.3)
MCHC RBC AUTO-ENTMCNC: 32.6 G/DL (ref 31.4–37.4)
MCV RBC AUTO: 100 FL (ref 82–98)
MONOCYTES # BLD AUTO: 0.5 THOUSAND/ΜL (ref 0.17–1.22)
MONOCYTES NFR BLD AUTO: 6 % (ref 4–12)
NEUTROPHILS # BLD AUTO: 5.02 THOUSANDS/ΜL (ref 1.85–7.62)
NEUTS SEG NFR BLD AUTO: 63 % (ref 43–75)
NONHDLC SERPL-MCNC: 54 MG/DL
NRBC BLD AUTO-RTO: 0 /100 WBCS
PLATELET # BLD AUTO: 264 THOUSANDS/UL (ref 149–390)
PMV BLD AUTO: 9.4 FL (ref 8.9–12.7)
POTASSIUM SERPL-SCNC: 4.6 MMOL/L (ref 3.5–5.3)
PROT SERPL-MCNC: 7.8 G/DL (ref 6.4–8.2)
RBC # BLD AUTO: 3.96 MILLION/UL (ref 3.81–5.12)
SODIUM SERPL-SCNC: 141 MMOL/L (ref 136–145)
TRIGL SERPL-MCNC: 95 MG/DL
TSH SERPL DL<=0.05 MIU/L-ACNC: 0.56 UIU/ML (ref 0.36–3.74)
WBC # BLD AUTO: 7.89 THOUSAND/UL (ref 4.31–10.16)

## 2020-08-04 PROCEDURE — 85025 COMPLETE CBC W/AUTO DIFF WBC: CPT

## 2020-08-04 PROCEDURE — 80061 LIPID PANEL: CPT

## 2020-08-04 PROCEDURE — 84443 ASSAY THYROID STIM HORMONE: CPT

## 2020-08-04 PROCEDURE — 3044F HG A1C LEVEL LT 7.0%: CPT | Performed by: INTERNAL MEDICINE

## 2020-08-04 PROCEDURE — 36415 COLL VENOUS BLD VENIPUNCTURE: CPT

## 2020-08-04 PROCEDURE — 80053 COMPREHEN METABOLIC PANEL: CPT

## 2020-08-04 PROCEDURE — 83036 HEMOGLOBIN GLYCOSYLATED A1C: CPT

## 2020-08-13 ENCOUNTER — OFFICE VISIT (OUTPATIENT)
Dept: INTERNAL MEDICINE CLINIC | Facility: CLINIC | Age: 75
End: 2020-08-13
Payer: COMMERCIAL

## 2020-08-13 VITALS
DIASTOLIC BLOOD PRESSURE: 60 MMHG | TEMPERATURE: 98.1 F | WEIGHT: 168.2 LBS | BODY MASS INDEX: 28.71 KG/M2 | SYSTOLIC BLOOD PRESSURE: 110 MMHG | HEIGHT: 64 IN | HEART RATE: 68 BPM | RESPIRATION RATE: 12 BRPM

## 2020-08-13 DIAGNOSIS — N18.30 CKD (CHRONIC KIDNEY DISEASE) STAGE 3, GFR 30-59 ML/MIN (HCC): ICD-10-CM

## 2020-08-13 DIAGNOSIS — D75.89 MACROCYTOSIS WITHOUT ANEMIA: ICD-10-CM

## 2020-08-13 DIAGNOSIS — R19.7 DIARRHEA, UNSPECIFIED TYPE: ICD-10-CM

## 2020-08-13 DIAGNOSIS — B00.9 HERPES SIMPLEX INFECTION: ICD-10-CM

## 2020-08-13 DIAGNOSIS — E03.9 ACQUIRED HYPOTHYROIDISM: Primary | ICD-10-CM

## 2020-08-13 DIAGNOSIS — M79.10 MYALGIA: ICD-10-CM

## 2020-08-13 DIAGNOSIS — F32.9 REACTIVE DEPRESSION: ICD-10-CM

## 2020-08-13 DIAGNOSIS — I10 ESSENTIAL HYPERTENSION: ICD-10-CM

## 2020-08-13 DIAGNOSIS — G89.29 CHRONIC RIGHT-SIDED LOW BACK PAIN WITHOUT SCIATICA: ICD-10-CM

## 2020-08-13 DIAGNOSIS — E78.2 MIXED HYPERLIPIDEMIA: ICD-10-CM

## 2020-08-13 DIAGNOSIS — E53.8 VITAMIN B12 DEFICIENCY: ICD-10-CM

## 2020-08-13 DIAGNOSIS — M54.50 CHRONIC RIGHT-SIDED LOW BACK PAIN WITHOUT SCIATICA: ICD-10-CM

## 2020-08-13 DIAGNOSIS — Z11.59 NEED FOR HEPATITIS C SCREENING TEST: ICD-10-CM

## 2020-08-13 DIAGNOSIS — E11.9 TYPE 2 DIABETES MELLITUS WITHOUT COMPLICATION, WITHOUT LONG-TERM CURRENT USE OF INSULIN (HCC): ICD-10-CM

## 2020-08-13 PROCEDURE — 99215 OFFICE O/P EST HI 40 MIN: CPT | Performed by: INTERNAL MEDICINE

## 2020-08-13 PROCEDURE — 3008F BODY MASS INDEX DOCD: CPT | Performed by: INTERNAL MEDICINE

## 2020-08-13 PROCEDURE — 3066F NEPHROPATHY DOC TX: CPT | Performed by: INTERNAL MEDICINE

## 2020-08-13 PROCEDURE — 1036F TOBACCO NON-USER: CPT | Performed by: INTERNAL MEDICINE

## 2020-08-13 PROCEDURE — 1160F RVW MEDS BY RX/DR IN RCRD: CPT | Performed by: INTERNAL MEDICINE

## 2020-08-13 PROCEDURE — 3060F POS MICROALBUMINURIA REV: CPT | Performed by: INTERNAL MEDICINE

## 2020-08-13 PROCEDURE — 3074F SYST BP LT 130 MM HG: CPT | Performed by: INTERNAL MEDICINE

## 2020-08-13 PROCEDURE — 3044F HG A1C LEVEL LT 7.0%: CPT | Performed by: INTERNAL MEDICINE

## 2020-08-13 PROCEDURE — 3078F DIAST BP <80 MM HG: CPT | Performed by: INTERNAL MEDICINE

## 2020-08-13 PROCEDURE — 4040F PNEUMOC VAC/ADMIN/RCVD: CPT | Performed by: INTERNAL MEDICINE

## 2020-08-13 RX ORDER — VALACYCLOVIR HYDROCHLORIDE 1 G/1
1000 TABLET, FILM COATED ORAL AS NEEDED
Qty: 14 TABLET | Refills: 0 | Status: SHIPPED | OUTPATIENT
Start: 2020-08-13 | End: 2022-05-12

## 2020-08-13 RX ORDER — METFORMIN HYDROCHLORIDE 500 MG/1
TABLET, EXTENDED RELEASE ORAL
Qty: 360 TABLET | Refills: 1
Start: 2020-08-13 | End: 2020-12-22

## 2020-08-13 NOTE — PATIENT INSTRUCTIONS
Lab data reviewed in detail and compared prior    Type 2 diabetes mellitus is under excellent control with metformin however patient is experiencing diarrhea and loose stool and therefore will cut back to 500 mg twice daily metformin and monitor symptoms, increase to 1500 mg per day if able  Will also discontinue magnesium as below    Diarrhea-magnesium can be contributing to this  Discontinue magnesium before changing the metformin regimen and monitor this for a few days  Also monitor for leg cramps  If the cramps return then you may need to get back on some magnesium supplement  If still having diarrhea with 1000 mg of metformin daily than contact me and we may need to consider alternative medication  Hypertension and hyperlipidemia stable on present regimen    Chronic kidney disease stage 3-use meloxicam judiciously  Keep well-hydrated      Macrocytosis without anemia-rule out B12 deficiency especially in light of metformin use    Hypothyroidism stable on levothyroxine    Depression stable on Lexapro    Low back pain-referral to physical therapy

## 2020-08-13 NOTE — PROGRESS NOTES
Assessment/Plan:    Diagnoses and all orders for this visit:    Acquired hypothyroidism    Herpes simplex infection  -     valACYclovir (VALTREX) 1,000 mg tablet; Take 1 tablet (1,000 mg total) by mouth as needed (as needed)    Type 2 diabetes mellitus without complication, without long-term current use of insulin (HCC)  -     metFORMIN (GLUCOPHAGE-XR) 500 mg 24 hr tablet; 1 po bid, increase to 3 per day as tolerated  -     CBC and differential; Future  -     Comprehensive metabolic panel; Future  -     Lipid panel; Future  -     Hemoglobin A1C; Future    Essential hypertension  -     Magnesium; Future    Mixed hyperlipidemia    Reactive depression    Diarrhea, unspecified type    Myalgia  -     Magnesium; Future    Vitamin B12 deficiency  -     Folate; Future  -     Vitamin B12; Future    Chronic right-sided low back pain without sciatica  -     Ambulatory referral to Physical Therapy; Future    Need for hepatitis C screening test  -     Hepatitis C antibody; Future    CKD (chronic kidney disease) stage 3, GFR 30-59 ml/min ([facility])    Macrocytosis without anemia         Patient Instructions   Lab data reviewed in detail and compared prior    Type 2 diabetes mellitus is under excellent control with metformin however patient is experiencing diarrhea and loose stool and therefore will cut back to 500 mg twice daily metformin and monitor symptoms, increase to 1500 mg per day if able  Will also discontinue magnesium as below    Diarrhea-magnesium can be contributing to this  Discontinue magnesium before changing the metformin regimen and monitor this for a few days  Also monitor for leg cramps  If the cramps return then you may need to get back on some magnesium supplement  If still having diarrhea with 1000 mg of metformin daily than contact me and we may need to consider alternative medication  Hypertension and hyperlipidemia stable on present regimen    Chronic kidney disease stage 3-use meloxicam judiciously  Keep well-hydrated  Macrocytosis without anemia-rule out B12 deficiency especially in light of metformin use    Hypothyroidism stable on levothyroxine    Depression stable on Lexapro    Low back pain-referral to physical therapy      Subjective:      Patient ID: Miki Dukes is a 76 y o  female    F/u mmp and review labs  Feeling generally well  Low back pain waxes and wanes, not doing any PT exercises  Using meloxicam as needed  Leg cramps resolved w/ magnesium supplement, taking 250 qd  HTN/HPL-taking rx as directed, home bp's have been stable  DM- Taking metformin as directed  Am sugars increased lately1230-160's  Notes diarrhea, worse in am, ave 4-5 bm per day  OAB-urination much better since surgery, still w/  3-4x nocturia, now w/ more urgency as well  Seeing Dr Vin Lemons in Sept   Hypothyroid taking rx as directed  Current Outpatient Medications:     acetaminophen (TYLENOL) 325 mg tablet, Take 2 tablets (650 mg total) by mouth every 6 (six) hours as needed for mild pain, Disp: 30 tablet, Rfl: 0    amLODIPine-benazepril (LOTREL 5-10) 5-10 MG per capsule, TAKE 1 CAPSULE EVERY DAY, Disp: 90 capsule, Rfl: 3    atorvastatin (LIPITOR) 10 mg tablet, Take 1 tablet (10 mg total) by mouth daily, Disp: 90 tablet, Rfl: 3    escitalopram (LEXAPRO) 5 mg tablet, TAKE 1 TABLET BY MOUTH EVERY DAY, Disp: 90 tablet, Rfl: 1    Lancets (ONETOUCH DELICA PLUS [id number]) MISC, TEST DAILY, Disp: 100 each, Rfl: 2    levothyroxine 100 mcg tablet, TAKE 1 TABLET DAILY  , Disp: 90 tablet, Rfl: 3    Magnesium 250 MG TABS, Take 1 tablet by mouth daily, Disp: , Rfl:     meloxicam (MOBIC) 15 mg tablet, TAKE 1 TABLET (15 MG TOTAL) BY MOUTH AS NEEDED FOR MODERATE PAIN, Disp: 90 tablet, Rfl: 1    metFORMIN (GLUCOPHAGE-XR) 500 mg 24 hr tablet, 1 po bid, increase to 3 per day as tolerated  , Disp: 360 tablet, Rfl: 1    Multiple Vitamins-Minerals (CENTRUM SILVER) CHEW, Chew 1 tablet daily, Disp: , Rfl:     ONE TOUCH ULTRA TEST test strip, 1 EACH BY OTHER ROUTE DAILY USE AS INSTRUCTED, Disp: 100 each, Rfl: 2    OneTouch Delica Lancets 77Q MISC, TEST 1 DAILY AS INSTRUCTED, Disp: 100 each, Rfl: 2    spironolactone (ALDACTONE) 25 mg tablet, TAKE 1 TABLET DAILY, Disp: 90 tablet, Rfl: 3    valACYclovir (VALTREX) 1,000 mg tablet, Take 1 tablet (1,000 mg total) by mouth as needed (as needed), Disp: 14 tablet, Rfl: 0    Recent Results (from the past 1008 hour(s))   CBC and differential    Collection Time: 08/04/20 10:10 AM   Result Value Ref Range    WBC 7 89 4 31 - 10 16 Thousand/uL    RBC 3 96 3 81 - 5 12 Million/uL    Hemoglobin 12 9 11 5 - 15 4 g/dL    Hematocrit 39 6 34 8 - 46 1 %     (H) 82 - 98 fL    MCH 32 6 26 8 - 34 3 pg    MCHC 32 6 31 4 - 37 4 g/dL    RDW 12 3 11 6 - 15 1 %    MPV 9 4 8 9 - 12 7 fL    Platelets 020 186 - 138 Thousands/uL    nRBC 0 /100 WBCs    Neutrophils Relative 63 43 - 75 %    Immat GRANS % 0 0 - 2 %    Lymphocytes Relative 27 14 - 44 %    Monocytes Relative 6 4 - 12 %    Eosinophils Relative 3 0 - 6 %    Basophils Relative 1 0 - 1 %    Neutrophils Absolute 5 02 1 85 - 7 62 Thousands/µL    Immature Grans Absolute 0 02 0 00 - 0 20 Thousand/uL    Lymphocytes Absolute 2 09 0 60 - 4 47 Thousands/µL    Monocytes Absolute 0 50 0 17 - 1 22 Thousand/µL    Eosinophils Absolute 0 21 0 00 - 0 61 Thousand/µL    Basophils Absolute 0 05 0 00 - 0 10 Thousands/µL   Comprehensive metabolic panel    Collection Time: 08/04/20 10:10 AM   Result Value Ref Range    Sodium 141 136 - 145 mmol/L    Potassium 4 6 3 5 - 5 3 mmol/L    Chloride 105 100 - 108 mmol/L    CO2 26 21 - 32 mmol/L    ANION GAP 10 4 - 13 mmol/L    BUN 24 5 - 25 mg/dL    Creatinine 1 07 0 60 - 1 30 mg/dL    Glucose, Fasting 142 (H) 65 - 99 mg/dL    Calcium 9 0 8 3 - 10 1 mg/dL    AST 22 5 - 45 U/L    ALT 39 12 - 78 U/L    Alkaline Phosphatase 48 46 - 116 U/L    Total Protein 7 8 6 4 - 8 2 g/dL    Albumin 3 9 3 5 - 5 0 g/dL    Total Bilirubin 0 30 0 20 - 1 00 mg/dL    eGFR 51 ml/min/1 73sq m   Lipid panel    Collection Time: 08/04/20 10:10 AM   Result Value Ref Range    Cholesterol 96 50 - 200 mg/dL    Triglycerides 95 <=150 mg/dL    HDL, Direct 42 >=40 mg/dL    LDL Calculated 35 0 - 100 mg/dL    Non-HDL-Chol (CHOL-HDL) 54 mg/dl   Hemoglobin A1C    Collection Time: 08/04/20 10:10 AM   Result Value Ref Range    Hemoglobin A1C 6 8 (H) Normal 3 8-5 6%; PreDiabetic 5 7-6 4%; Diabetic >=6 5%; Glycemic control for adults with diabetes <7 0% %     mg/dl   TSH, 3rd generation with Free T4 reflex    Collection Time: 08/04/20 10:10 AM   Result Value Ref Range    TSH 3RD GENERATON 0 557 0 358 - 3 740 uIU/mL       The following portions of the patient's history were reviewed and updated as appropriate: allergies, current medications, past family history, past medical history, past social history, past surgical history and problem list      Review of Systems   Constitutional: Negative for appetite change, chills, diaphoresis, fatigue, fever and unexpected weight change  HENT: Negative for congestion, hearing loss and rhinorrhea  Eyes: Negative for visual disturbance  Respiratory: Negative for cough, chest tightness, shortness of breath and wheezing  Cardiovascular: Negative for chest pain, palpitations and leg swelling  Gastrointestinal: Positive for diarrhea  Negative for abdominal pain and blood in stool  Endocrine: Negative for cold intolerance, heat intolerance, polydipsia and polyuria  Genitourinary: Positive for urgency  Negative for difficulty urinating, dysuria and frequency  Musculoskeletal: Positive for arthralgias, back pain and myalgias  Skin: Negative for rash  Neurological: Negative for dizziness, weakness, light-headedness and headaches  Hematological: Does not bruise/bleed easily  Psychiatric/Behavioral: Negative for dysphoric mood and sleep disturbance           Objective:      Vitals:    08/13/20 1350   BP: 110/60   Pulse: 68   Resp: 12   Temp: 98 1 °F (36 7 °C)          Physical Exam  Constitutional:       Appearance: She is well-developed  HENT:      Head: Normocephalic and atraumatic  Nose: Nose normal    Eyes:      General: No scleral icterus  Conjunctiva/sclera: Conjunctivae normal       Pupils: Pupils are equal, round, and reactive to light  Neck:      Musculoskeletal: Normal range of motion and neck supple  Thyroid: No thyromegaly  Vascular: No JVD  Trachea: No tracheal deviation  Cardiovascular:      Rate and Rhythm: Normal rate and regular rhythm  Heart sounds: No murmur  No friction rub  No gallop  Pulmonary:      Effort: Pulmonary effort is normal  No respiratory distress  Breath sounds: Normal breath sounds  No wheezing or rales  Abdominal:      General: Bowel sounds are normal  There is no distension  Palpations: Abdomen is soft  There is no mass  Tenderness: There is no abdominal tenderness  There is no guarding or rebound  Musculoskeletal:         General: No tenderness or deformity  Lymphadenopathy:      Cervical: No cervical adenopathy  Skin:     General: Skin is warm and dry  Coloration: Skin is not pale  Findings: No erythema or rash  Neurological:      Mental Status: She is alert and oriented to person, place, and time  Cranial Nerves: No cranial nerve deficit  Psychiatric:         Behavior: Behavior normal          Thought Content:  Thought content normal          Judgment: Judgment normal

## 2020-11-09 DIAGNOSIS — F32.9 REACTIVE DEPRESSION: ICD-10-CM

## 2020-11-09 RX ORDER — ESCITALOPRAM OXALATE 5 MG/1
TABLET ORAL
Qty: 90 TABLET | Refills: 1 | Status: SHIPPED | OUTPATIENT
Start: 2020-11-09 | End: 2021-05-07

## 2020-12-14 ENCOUNTER — LAB (OUTPATIENT)
Dept: LAB | Facility: HOSPITAL | Age: 75
End: 2020-12-14
Attending: INTERNAL MEDICINE
Payer: COMMERCIAL

## 2020-12-14 DIAGNOSIS — E11.9 TYPE 2 DIABETES MELLITUS WITHOUT COMPLICATION, WITHOUT LONG-TERM CURRENT USE OF INSULIN (HCC): ICD-10-CM

## 2020-12-14 DIAGNOSIS — M79.10 MYALGIA: ICD-10-CM

## 2020-12-14 DIAGNOSIS — I10 ESSENTIAL HYPERTENSION: ICD-10-CM

## 2020-12-14 DIAGNOSIS — Z11.59 NEED FOR HEPATITIS C SCREENING TEST: ICD-10-CM

## 2020-12-14 DIAGNOSIS — E53.8 VITAMIN B12 DEFICIENCY: ICD-10-CM

## 2020-12-14 LAB
ALBUMIN SERPL BCP-MCNC: 4.1 G/DL (ref 3.5–5)
ALP SERPL-CCNC: 48 U/L (ref 46–116)
ALT SERPL W P-5'-P-CCNC: 42 U/L (ref 12–78)
ANION GAP SERPL CALCULATED.3IONS-SCNC: 10 MMOL/L (ref 4–13)
AST SERPL W P-5'-P-CCNC: 30 U/L (ref 5–45)
BASOPHILS # BLD AUTO: 0.07 THOUSANDS/ΜL (ref 0–0.1)
BASOPHILS NFR BLD AUTO: 1 % (ref 0–1)
BILIRUB SERPL-MCNC: 0.4 MG/DL (ref 0.2–1)
BUN SERPL-MCNC: 21 MG/DL (ref 5–25)
CALCIUM SERPL-MCNC: 9.2 MG/DL (ref 8.3–10.1)
CHLORIDE SERPL-SCNC: 105 MMOL/L (ref 100–108)
CHOLEST SERPL-MCNC: 113 MG/DL (ref 50–200)
CO2 SERPL-SCNC: 28 MMOL/L (ref 21–32)
CREAT SERPL-MCNC: 1.08 MG/DL (ref 0.6–1.3)
EOSINOPHIL # BLD AUTO: 0.17 THOUSAND/ΜL (ref 0–0.61)
EOSINOPHIL NFR BLD AUTO: 2 % (ref 0–6)
ERYTHROCYTE [DISTWIDTH] IN BLOOD BY AUTOMATED COUNT: 12 % (ref 11.6–15.1)
EST. AVERAGE GLUCOSE BLD GHB EST-MCNC: 143 MG/DL
GFR SERPL CREATININE-BSD FRML MDRD: 50 ML/MIN/1.73SQ M
GLUCOSE P FAST SERPL-MCNC: 119 MG/DL (ref 65–99)
HBA1C MFR BLD: 6.6 %
HCT VFR BLD AUTO: 42.2 % (ref 34.8–46.1)
HDLC SERPL-MCNC: 45 MG/DL
HGB BLD-MCNC: 13.8 G/DL (ref 11.5–15.4)
IMM GRANULOCYTES # BLD AUTO: 0.03 THOUSAND/UL (ref 0–0.2)
IMM GRANULOCYTES NFR BLD AUTO: 0 % (ref 0–2)
LDLC SERPL CALC-MCNC: 46 MG/DL (ref 0–100)
LYMPHOCYTES # BLD AUTO: 1.97 THOUSANDS/ΜL (ref 0.6–4.47)
LYMPHOCYTES NFR BLD AUTO: 25 % (ref 14–44)
MAGNESIUM SERPL-MCNC: 1.6 MG/DL (ref 1.6–2.6)
MCH RBC QN AUTO: 32.3 PG (ref 26.8–34.3)
MCHC RBC AUTO-ENTMCNC: 32.7 G/DL (ref 31.4–37.4)
MCV RBC AUTO: 99 FL (ref 82–98)
MONOCYTES # BLD AUTO: 0.52 THOUSAND/ΜL (ref 0.17–1.22)
MONOCYTES NFR BLD AUTO: 7 % (ref 4–12)
NEUTROPHILS # BLD AUTO: 5.08 THOUSANDS/ΜL (ref 1.85–7.62)
NEUTS SEG NFR BLD AUTO: 65 % (ref 43–75)
NONHDLC SERPL-MCNC: 68 MG/DL
NRBC BLD AUTO-RTO: 0 /100 WBCS
PLATELET # BLD AUTO: 276 THOUSANDS/UL (ref 149–390)
PMV BLD AUTO: 9.5 FL (ref 8.9–12.7)
POTASSIUM SERPL-SCNC: 5.2 MMOL/L (ref 3.5–5.3)
PROT SERPL-MCNC: 7.9 G/DL (ref 6.4–8.2)
RBC # BLD AUTO: 4.27 MILLION/UL (ref 3.81–5.12)
SODIUM SERPL-SCNC: 143 MMOL/L (ref 136–145)
TRIGL SERPL-MCNC: 112 MG/DL
WBC # BLD AUTO: 7.84 THOUSAND/UL (ref 4.31–10.16)

## 2020-12-14 PROCEDURE — 85025 COMPLETE CBC W/AUTO DIFF WBC: CPT

## 2020-12-14 PROCEDURE — 80061 LIPID PANEL: CPT

## 2020-12-14 PROCEDURE — 83036 HEMOGLOBIN GLYCOSYLATED A1C: CPT

## 2020-12-14 PROCEDURE — 86803 HEPATITIS C AB TEST: CPT

## 2020-12-14 PROCEDURE — 83735 ASSAY OF MAGNESIUM: CPT

## 2020-12-14 PROCEDURE — 82607 VITAMIN B-12: CPT

## 2020-12-14 PROCEDURE — 36415 COLL VENOUS BLD VENIPUNCTURE: CPT

## 2020-12-14 PROCEDURE — 82746 ASSAY OF FOLIC ACID SERUM: CPT

## 2020-12-14 PROCEDURE — 80053 COMPREHEN METABOLIC PANEL: CPT

## 2020-12-15 LAB
FOLATE SERPL-MCNC: >20 NG/ML (ref 3.1–17.5)
HCV AB SER QL: NORMAL
VIT B12 SERPL-MCNC: 276 PG/ML (ref 100–900)

## 2020-12-22 DIAGNOSIS — E11.9 TYPE 2 DIABETES MELLITUS WITHOUT COMPLICATION, WITHOUT LONG-TERM CURRENT USE OF INSULIN (HCC): ICD-10-CM

## 2020-12-22 DIAGNOSIS — M54.16 LUMBAR RADICULOPATHY: ICD-10-CM

## 2020-12-22 RX ORDER — MELOXICAM 15 MG/1
15 TABLET ORAL AS NEEDED
Qty: 90 TABLET | Refills: 1 | Status: SHIPPED | OUTPATIENT
Start: 2020-12-22 | End: 2021-01-13

## 2020-12-22 RX ORDER — METFORMIN HYDROCHLORIDE 500 MG/1
TABLET, EXTENDED RELEASE ORAL
Qty: 360 TABLET | Refills: 1 | Status: SHIPPED | OUTPATIENT
Start: 2020-12-22 | End: 2021-06-17

## 2021-01-13 ENCOUNTER — OFFICE VISIT (OUTPATIENT)
Dept: INTERNAL MEDICINE CLINIC | Facility: CLINIC | Age: 76
End: 2021-01-13
Payer: COMMERCIAL

## 2021-01-13 VITALS
HEIGHT: 64 IN | SYSTOLIC BLOOD PRESSURE: 102 MMHG | TEMPERATURE: 98.2 F | WEIGHT: 175 LBS | DIASTOLIC BLOOD PRESSURE: 62 MMHG | HEART RATE: 72 BPM | BODY MASS INDEX: 29.88 KG/M2 | RESPIRATION RATE: 16 BRPM

## 2021-01-13 DIAGNOSIS — I10 ESSENTIAL HYPERTENSION: ICD-10-CM

## 2021-01-13 DIAGNOSIS — E03.9 ACQUIRED HYPOTHYROIDISM: ICD-10-CM

## 2021-01-13 DIAGNOSIS — D75.89 MACROCYTOSIS WITHOUT ANEMIA: ICD-10-CM

## 2021-01-13 DIAGNOSIS — F32.9 REACTIVE DEPRESSION: ICD-10-CM

## 2021-01-13 DIAGNOSIS — E11.9 TYPE 2 DIABETES MELLITUS WITHOUT COMPLICATION, WITHOUT LONG-TERM CURRENT USE OF INSULIN (HCC): Primary | ICD-10-CM

## 2021-01-13 DIAGNOSIS — E78.2 MIXED HYPERLIPIDEMIA: ICD-10-CM

## 2021-01-13 DIAGNOSIS — R79.0 LOW MAGNESIUM LEVEL: ICD-10-CM

## 2021-01-13 PROCEDURE — 1160F RVW MEDS BY RX/DR IN RCRD: CPT | Performed by: INTERNAL MEDICINE

## 2021-01-13 PROCEDURE — 3074F SYST BP LT 130 MM HG: CPT | Performed by: INTERNAL MEDICINE

## 2021-01-13 PROCEDURE — 1036F TOBACCO NON-USER: CPT | Performed by: INTERNAL MEDICINE

## 2021-01-13 PROCEDURE — 3725F SCREEN DEPRESSION PERFORMED: CPT | Performed by: INTERNAL MEDICINE

## 2021-01-13 PROCEDURE — 3078F DIAST BP <80 MM HG: CPT | Performed by: INTERNAL MEDICINE

## 2021-01-13 PROCEDURE — 99214 OFFICE O/P EST MOD 30 MIN: CPT | Performed by: INTERNAL MEDICINE

## 2021-01-13 NOTE — PROGRESS NOTES
Assessment/Plan:    Diagnoses and all orders for this visit:    Type 2 diabetes mellitus without complication, without long-term current use of insulin (HCC)  -     Hemoglobin A1C; Future    Acquired hypothyroidism  -     TSH, 3rd generation with Free T4 reflex; Future    Reactive depression    Essential hypertension  -     CBC and differential; Future  -     Comprehensive metabolic panel; Future    Mixed hyperlipidemia  -     Lipid panel; Future    Macrocytosis without anemia    Low magnesium level         BMI Counseling: Body mass index is 30 04 kg/m²  The BMI is above normal  Nutrition recommendations include decreasing portion sizes, decreasing fast food intake, consuming healthier snacks, limiting drinks that contain sugar, moderation in carbohydrate intake and reducing intake of saturated and trans fat  Exercise recommendations include moderate physical activity 150 minutes/week  No pharmacotherapy was ordered  Patient Instructions   Lab data reviewed in detail and compared prior    Type 2 diabetes mellitus remained stable on metformin with improved A1c down to 6 6  Hypertension stable on present regimen    Hyperlipidemia at goal on atorvastatin    Depression stable with Lexapro    Hypo magnesemia-consider eating [place] nuts, Mag level normalized    Routine follow-up after labs in 6 months, sooner as needed  Subjective:      Patient ID: Neftaly Castorena is a 76 y o  female    F/u mmp and review labs  Feeling generally well  Gained back most of the wt she had lost, admits upset w/ death/covid and holidays  Low back pain waxes and wanes, not doing any PT exercises  Stopped using meloxicam   Now using motrin hs prn  Leg cramps resolved, stopped mg d/t diarrhea  Keeping active, but no formal exercise  HTN/HPL-taking rx as directed, home bp's have been stable 120/70's  DM- Taking metformin as directed  Am sugars increased lately130-160's    Notes diarrhea, worse in am, ave 1-3 loose bm per day  OAB-urination much better since surgery, nocturia improved, now once, still w/ more urgency  Hypothyroid taking rx as directed  Current Outpatient Medications:     acetaminophen (TYLENOL) 325 mg tablet, Take 2 tablets (650 mg total) by mouth every 6 (six) hours as needed for mild pain, Disp: 30 tablet, Rfl: 0    amLODIPine-benazepril (LOTREL 5-10) 5-10 MG per capsule, TAKE 1 CAPSULE EVERY DAY, Disp: 90 capsule, Rfl: 3    atorvastatin (LIPITOR) 10 mg tablet, Take 1 tablet (10 mg total) by mouth daily, Disp: 90 tablet, Rfl: 3    escitalopram (LEXAPRO) 5 mg tablet, TAKE 1 TABLET BY MOUTH EVERY DAY, Disp: 90 tablet, Rfl: 1    Lancets (ONETOUCH DELICA PLUS TIHKRL42Z) MISC, TEST DAILY, Disp: 100 each, Rfl: 2    levothyroxine 100 mcg tablet, TAKE 1 TABLET DAILY  , Disp: 90 tablet, Rfl: 3    metFORMIN (GLUCOPHAGE-XR) 500 mg 24 hr tablet, START WITH 1 TABLET DAILY W/ DINNER, INCREASE BY 1 TAB EACH WEEK AS TOLERATED UNTIL TAKING 4 TABS (Patient taking differently: Take 500 mg by mouth 4 (four) times a day START WITH 1 TABLET DAILY W/ DINNER, INCREASE BY 1 TAB EACH WEEK AS TOLERATED UNTIL TAKING 4 TABS), Disp: 360 tablet, Rfl: 1    Multiple Vitamins-Minerals (CENTRUM SILVER) CHEW, Chew 1 tablet daily, Disp: , Rfl:     ONE TOUCH ULTRA TEST test strip, 1 EACH BY OTHER ROUTE DAILY USE AS INSTRUCTED, Disp: 100 each, Rfl: 2    OneTouch Delica Lancets 43G MISC, TEST 1 DAILY AS INSTRUCTED, Disp: 100 each, Rfl: 2    spironolactone (ALDACTONE) 25 mg tablet, TAKE 1 TABLET DAILY, Disp: 90 tablet, Rfl: 3    valACYclovir (VALTREX) 1,000 mg tablet, Take 1 tablet (1,000 mg total) by mouth as needed (as needed), Disp: 14 tablet, Rfl: 0    Recent Results (from the past 1008 hour(s))   Folate    Collection Time: 12/14/20  2:37 PM   Result Value Ref Range    Folate >20 0 (H) 3 1 - 17 5 ng/mL   Vitamin B12    Collection Time: 12/14/20  2:37 PM   Result Value Ref Range    Vitamin B-12 276 100 - 900 pg/mL   CBC and differential    Collection Time: 12/14/20  2:37 PM   Result Value Ref Range    WBC 7 84 4 31 - 10 16 Thousand/uL    RBC 4 27 3 81 - 5 12 Million/uL    Hemoglobin 13 8 11 5 - 15 4 g/dL    Hematocrit 42 2 34 8 - 46 1 %    MCV 99 (H) 82 - 98 fL    MCH 32 3 26 8 - 34 3 pg    MCHC 32 7 31 4 - 37 4 g/dL    RDW 12 0 11 6 - 15 1 %    MPV 9 5 8 9 - 12 7 fL    Platelets 210 806 - 083 Thousands/uL    nRBC 0 /100 WBCs    Neutrophils Relative 65 43 - 75 %    Immat GRANS % 0 0 - 2 %    Lymphocytes Relative 25 14 - 44 %    Monocytes Relative 7 4 - 12 %    Eosinophils Relative 2 0 - 6 %    Basophils Relative 1 0 - 1 %    Neutrophils Absolute 5 08 1 85 - 7 62 Thousands/µL    Immature Grans Absolute 0 03 0 00 - 0 20 Thousand/uL    Lymphocytes Absolute 1 97 0 60 - 4 47 Thousands/µL    Monocytes Absolute 0 52 0 17 - 1 22 Thousand/µL    Eosinophils Absolute 0 17 0 00 - 0 61 Thousand/µL    Basophils Absolute 0 07 0 00 - 0 10 Thousands/µL   Comprehensive metabolic panel    Collection Time: 12/14/20  2:37 PM   Result Value Ref Range    Sodium 143 136 - 145 mmol/L    Potassium 5 2 3 5 - 5 3 mmol/L    Chloride 105 100 - 108 mmol/L    CO2 28 21 - 32 mmol/L    ANION GAP 10 4 - 13 mmol/L    BUN 21 5 - 25 mg/dL    Creatinine 1 08 0 60 - 1 30 mg/dL    Glucose, Fasting 119 (H) 65 - 99 mg/dL    Calcium 9 2 8 3 - 10 1 mg/dL    AST 30 5 - 45 U/L    ALT 42 12 - 78 U/L    Alkaline Phosphatase 48 46 - 116 U/L    Total Protein 7 9 6 4 - 8 2 g/dL    Albumin 4 1 3 5 - 5 0 g/dL    Total Bilirubin 0 40 0 20 - 1 00 mg/dL    eGFR 50 ml/min/1 73sq m   Lipid panel    Collection Time: 12/14/20  2:37 PM   Result Value Ref Range    Cholesterol 113 50 - 200 mg/dL    Triglycerides 112 <=150 mg/dL    HDL, Direct 45 >=40 mg/dL    LDL Calculated 46 0 - 100 mg/dL    Non-HDL-Chol (CHOL-HDL) 68 mg/dl   Hemoglobin A1C    Collection Time: 12/14/20  2:37 PM   Result Value Ref Range    Hemoglobin A1C 6 6 (H) Normal 3 8-5 6%; PreDiabetic 5 7-6 4%;  Diabetic >=6 5%; Glycemic control for adults with diabetes <7 0% %     mg/dl   Hepatitis C antibody    Collection Time: 12/14/20  2:37 PM   Result Value Ref Range    Hepatitis C Ab Non-reactive Non-reactive   Magnesium    Collection Time: 12/14/20  2:37 PM   Result Value Ref Range    Magnesium 1 6 1 6 - 2 6 mg/dL       The following portions of the patient's history were reviewed and updated as appropriate: allergies, current medications, past family history, past medical history, past social history, past surgical history and problem list      Review of Systems   Constitutional: Positive for fatigue  Negative for appetite change, chills, diaphoresis, fever and unexpected weight change  HENT: Negative for congestion, hearing loss and rhinorrhea  Eyes: Negative for visual disturbance  Respiratory: Negative for cough, chest tightness, shortness of breath and wheezing  Cardiovascular: Negative for chest pain, palpitations and leg swelling  Gastrointestinal: Negative for abdominal pain and blood in stool  Endocrine: Negative for cold intolerance, heat intolerance, polydipsia and polyuria  Genitourinary: Negative for difficulty urinating, dysuria, frequency and urgency  Musculoskeletal: Negative for arthralgias and myalgias  Skin: Negative for rash  Neurological: Negative for dizziness, weakness, light-headedness and headaches  Hematological: Does not bruise/bleed easily  Psychiatric/Behavioral: Negative for dysphoric mood and sleep disturbance  Objective:      Vitals:    01/13/21 1602   BP: 102/62   Pulse: 72   Resp: 16   Temp: 98 2 °F (36 8 °C)          Physical Exam  Constitutional:       Appearance: She is well-developed  HENT:      Head: Normocephalic and atraumatic  Nose: Nose normal    Eyes:      General: No scleral icterus  Conjunctiva/sclera: Conjunctivae normal       Pupils: Pupils are equal, round, and reactive to light     Neck:      Musculoskeletal: Normal range of motion and neck supple  Thyroid: No thyromegaly  Vascular: No JVD  Trachea: No tracheal deviation  Cardiovascular:      Rate and Rhythm: Normal rate and regular rhythm  Pulses: no weak pulses          Dorsalis pedis pulses are 1+ on the right side and 1+ on the left side  Posterior tibial pulses are 1+ on the right side and 1+ on the left side  Heart sounds: No murmur  No friction rub  No gallop  Pulmonary:      Effort: Pulmonary effort is normal  No respiratory distress  Breath sounds: Normal breath sounds  No wheezing or rales  Musculoskeletal:         General: No deformity  Feet:      Right foot:      Skin integrity: No ulcer, skin breakdown, erythema, warmth, callus or dry skin  Left foot:      Skin integrity: No ulcer, skin breakdown, erythema, warmth, callus or dry skin  Lymphadenopathy:      Cervical: No cervical adenopathy  Skin:     General: Skin is warm and dry  Coloration: Skin is not pale  Findings: No erythema or rash  Neurological:      Mental Status: She is alert and oriented to person, place, and time  Cranial Nerves: No cranial nerve deficit  Psychiatric:         Behavior: Behavior normal          Thought Content: Thought content normal          Judgment: Judgment normal      Diabetic Foot Exam    Patient's shoes and socks removed  Right Foot/Ankle   Right Foot Inspection  Skin Exam: skin normal and skin intact no dry skin, no warmth, no callus, no erythema, no maceration, no abnormal color, no pre-ulcer, no ulcer and no callus                          Toe Exam: ROM and strength within normal limits  Sensory     Proprioception: intact   Monofilament testing: intact  Vascular  Capillary refills: < 3 seconds  The right DP pulse is 1+  The right PT pulse is 1+       Left Foot/Ankle  Left Foot Inspection  Skin Exam: skin normal and skin intactno dry skin, no warmth, no erythema, no maceration, normal color, no pre-ulcer, no ulcer and no callus Toe Exam: ROM and strength within normal limits                   Sensory     Proprioception: intact  Monofilament: intact  Vascular  Capillary refills: < 3 seconds  The left DP pulse is 1+  The left PT pulse is 1+  Assign Risk Category:  No deformity present; No loss of protective sensation;  No weak pulses       Risk: 0

## 2021-01-13 NOTE — PATIENT INSTRUCTIONS
Lab data reviewed in detail and compared prior    Type 2 diabetes mellitus remained stable on metformin with improved A1c down to 6 6  Hypertension stable on present regimen    Hyperlipidemia at goal on atorvastatin    Depression stable with Lexapro    Hypo magnesemia-consider eating [place] nuts, Mag level normalized    Routine follow-up after labs in 6 months, sooner as needed

## 2021-01-20 DIAGNOSIS — Z23 ENCOUNTER FOR IMMUNIZATION: ICD-10-CM

## 2021-01-28 LAB
LEFT EYE DIABETIC RETINOPATHY: NORMAL
RIGHT EYE DIABETIC RETINOPATHY: NORMAL

## 2021-02-07 DIAGNOSIS — E78.2 MIXED HYPERLIPIDEMIA: ICD-10-CM

## 2021-02-07 RX ORDER — ATORVASTATIN CALCIUM 10 MG/1
TABLET, FILM COATED ORAL
Qty: 90 TABLET | Refills: 3 | Status: SHIPPED | OUTPATIENT
Start: 2021-02-07 | End: 2022-02-04

## 2021-02-08 ENCOUNTER — IMMUNIZATIONS (OUTPATIENT)
Dept: FAMILY MEDICINE CLINIC | Facility: HOSPITAL | Age: 76
End: 2021-02-08

## 2021-02-08 DIAGNOSIS — Z23 ENCOUNTER FOR IMMUNIZATION: Primary | ICD-10-CM

## 2021-02-08 PROCEDURE — 91301 SARS-COV-2 / COVID-19 MRNA VACCINE (MODERNA) 100 MCG: CPT

## 2021-02-08 PROCEDURE — 0011A SARS-COV-2 / COVID-19 MRNA VACCINE (MODERNA) 100 MCG: CPT

## 2021-03-08 ENCOUNTER — IMMUNIZATIONS (OUTPATIENT)
Dept: FAMILY MEDICINE CLINIC | Facility: HOSPITAL | Age: 76
End: 2021-03-08

## 2021-03-08 DIAGNOSIS — Z23 ENCOUNTER FOR IMMUNIZATION: Primary | ICD-10-CM

## 2021-03-08 PROCEDURE — 0012A SARS-COV-2 / COVID-19 MRNA VACCINE (MODERNA) 100 MCG: CPT

## 2021-03-08 PROCEDURE — 91301 SARS-COV-2 / COVID-19 MRNA VACCINE (MODERNA) 100 MCG: CPT

## 2021-03-16 ENCOUNTER — TRANSCRIBE ORDERS (OUTPATIENT)
Dept: ADMINISTRATIVE | Facility: HOSPITAL | Age: 76
End: 2021-03-16

## 2021-03-16 ENCOUNTER — APPOINTMENT (OUTPATIENT)
Dept: LAB | Facility: HOSPITAL | Age: 76
End: 2021-03-16
Payer: COMMERCIAL

## 2021-03-16 DIAGNOSIS — R39.15 URGENCY OF URINATION: ICD-10-CM

## 2021-03-16 DIAGNOSIS — R35.0 FREQUENCY OF URINATION: ICD-10-CM

## 2021-03-16 DIAGNOSIS — R35.0 FREQUENCY OF URINATION: Primary | ICD-10-CM

## 2021-03-16 LAB
BACTERIA UR QL AUTO: ABNORMAL /HPF
BILIRUB UR QL STRIP: NEGATIVE
CLARITY UR: ABNORMAL
COLOR UR: YELLOW
GLUCOSE UR STRIP-MCNC: NEGATIVE MG/DL
HGB UR QL STRIP.AUTO: ABNORMAL
KETONES UR STRIP-MCNC: ABNORMAL MG/DL
LEUKOCYTE ESTERASE UR QL STRIP: ABNORMAL
MUCOUS THREADS UR QL AUTO: ABNORMAL
NITRITE UR QL STRIP: POSITIVE
NON-SQ EPI CELLS URNS QL MICRO: ABNORMAL /HPF
PH UR STRIP.AUTO: 5.5 [PH]
PROT UR STRIP-MCNC: >=300 MG/DL
RBC #/AREA URNS AUTO: ABNORMAL /HPF
SP GR UR STRIP.AUTO: >=1.03 (ref 1–1.03)
UROBILINOGEN UR QL STRIP.AUTO: 0.2 E.U./DL
WBC #/AREA URNS AUTO: ABNORMAL /HPF

## 2021-03-16 PROCEDURE — 81001 URINALYSIS AUTO W/SCOPE: CPT | Performed by: OBSTETRICS & GYNECOLOGY

## 2021-03-16 PROCEDURE — 87186 SC STD MICRODIL/AGAR DIL: CPT

## 2021-03-16 PROCEDURE — 87077 CULTURE AEROBIC IDENTIFY: CPT

## 2021-03-16 PROCEDURE — 87086 URINE CULTURE/COLONY COUNT: CPT

## 2021-03-18 LAB
BACTERIA UR CULT: ABNORMAL
BACTERIA UR CULT: ABNORMAL

## 2021-05-07 DIAGNOSIS — F32.9 REACTIVE DEPRESSION: ICD-10-CM

## 2021-05-07 RX ORDER — ESCITALOPRAM OXALATE 5 MG/1
TABLET ORAL
Qty: 90 TABLET | Refills: 1 | Status: SHIPPED | OUTPATIENT
Start: 2021-05-07 | End: 2021-11-13

## 2021-06-16 DIAGNOSIS — E03.9 ACQUIRED HYPOTHYROIDISM: ICD-10-CM

## 2021-06-16 DIAGNOSIS — I10 ESSENTIAL HYPERTENSION: ICD-10-CM

## 2021-06-16 DIAGNOSIS — E11.9 TYPE 2 DIABETES MELLITUS WITHOUT COMPLICATION, WITHOUT LONG-TERM CURRENT USE OF INSULIN (HCC): ICD-10-CM

## 2021-06-16 RX ORDER — SPIRONOLACTONE 25 MG/1
TABLET ORAL
Qty: 90 TABLET | Refills: 3 | Status: SHIPPED | OUTPATIENT
Start: 2021-06-16 | End: 2022-06-10

## 2021-06-16 RX ORDER — AMLODIPINE BESYLATE AND BENAZEPRIL HYDROCHLORIDE 5; 10 MG/1; MG/1
CAPSULE ORAL
Qty: 90 CAPSULE | Refills: 3 | Status: SHIPPED | OUTPATIENT
Start: 2021-06-16 | End: 2022-06-10

## 2021-06-17 RX ORDER — METFORMIN HYDROCHLORIDE 500 MG/1
TABLET, EXTENDED RELEASE ORAL
Qty: 360 TABLET | Refills: 1 | Status: SHIPPED | OUTPATIENT
Start: 2021-06-17 | End: 2021-12-05

## 2021-06-17 RX ORDER — LEVOTHYROXINE SODIUM 0.1 MG/1
TABLET ORAL
Qty: 90 TABLET | Refills: 3 | Status: SHIPPED | OUTPATIENT
Start: 2021-06-17 | End: 2022-06-10

## 2021-07-16 ENCOUNTER — APPOINTMENT (OUTPATIENT)
Dept: LAB | Facility: HOSPITAL | Age: 76
End: 2021-07-16
Attending: INTERNAL MEDICINE
Payer: COMMERCIAL

## 2021-07-16 DIAGNOSIS — E11.9 TYPE 2 DIABETES MELLITUS WITHOUT COMPLICATION, WITHOUT LONG-TERM CURRENT USE OF INSULIN (HCC): ICD-10-CM

## 2021-07-16 DIAGNOSIS — E03.9 ACQUIRED HYPOTHYROIDISM: ICD-10-CM

## 2021-07-16 DIAGNOSIS — E78.2 MIXED HYPERLIPIDEMIA: ICD-10-CM

## 2021-07-16 DIAGNOSIS — I10 ESSENTIAL HYPERTENSION: ICD-10-CM

## 2021-07-16 LAB
ALBUMIN SERPL BCP-MCNC: 3.9 G/DL (ref 3.5–5)
ALP SERPL-CCNC: 36 U/L (ref 46–116)
ALT SERPL W P-5'-P-CCNC: 46 U/L (ref 12–78)
ANION GAP SERPL CALCULATED.3IONS-SCNC: 9 MMOL/L (ref 4–13)
AST SERPL W P-5'-P-CCNC: 28 U/L (ref 5–45)
BASOPHILS # BLD AUTO: 0.07 THOUSANDS/ΜL (ref 0–0.1)
BASOPHILS NFR BLD AUTO: 1 % (ref 0–1)
BILIRUB SERPL-MCNC: 0.36 MG/DL (ref 0.2–1)
BUN SERPL-MCNC: 17 MG/DL (ref 5–25)
CALCIUM SERPL-MCNC: 8.9 MG/DL (ref 8.3–10.1)
CHLORIDE SERPL-SCNC: 105 MMOL/L (ref 100–108)
CHOLEST SERPL-MCNC: 107 MG/DL (ref 50–200)
CO2 SERPL-SCNC: 27 MMOL/L (ref 21–32)
CREAT SERPL-MCNC: 1.09 MG/DL (ref 0.6–1.3)
EOSINOPHIL # BLD AUTO: 0.19 THOUSAND/ΜL (ref 0–0.61)
EOSINOPHIL NFR BLD AUTO: 3 % (ref 0–6)
ERYTHROCYTE [DISTWIDTH] IN BLOOD BY AUTOMATED COUNT: 12.4 % (ref 11.6–15.1)
EST. AVERAGE GLUCOSE BLD GHB EST-MCNC: 151 MG/DL
GFR SERPL CREATININE-BSD FRML MDRD: 49 ML/MIN/1.73SQ M
GLUCOSE P FAST SERPL-MCNC: 142 MG/DL (ref 65–99)
HBA1C MFR BLD: 6.9 %
HCT VFR BLD AUTO: 39.8 % (ref 34.8–46.1)
HDLC SERPL-MCNC: 40 MG/DL
HGB BLD-MCNC: 12.8 G/DL (ref 11.5–15.4)
IMM GRANULOCYTES # BLD AUTO: 0.03 THOUSAND/UL (ref 0–0.2)
IMM GRANULOCYTES NFR BLD AUTO: 0 % (ref 0–2)
LDLC SERPL CALC-MCNC: 26 MG/DL (ref 0–100)
LYMPHOCYTES # BLD AUTO: 1.99 THOUSANDS/ΜL (ref 0.6–4.47)
LYMPHOCYTES NFR BLD AUTO: 29 % (ref 14–44)
MCH RBC QN AUTO: 31.9 PG (ref 26.8–34.3)
MCHC RBC AUTO-ENTMCNC: 32.2 G/DL (ref 31.4–37.4)
MCV RBC AUTO: 99 FL (ref 82–98)
MONOCYTES # BLD AUTO: 0.48 THOUSAND/ΜL (ref 0.17–1.22)
MONOCYTES NFR BLD AUTO: 7 % (ref 4–12)
NEUTROPHILS # BLD AUTO: 4.21 THOUSANDS/ΜL (ref 1.85–7.62)
NEUTS SEG NFR BLD AUTO: 60 % (ref 43–75)
NONHDLC SERPL-MCNC: 67 MG/DL
NRBC BLD AUTO-RTO: 0 /100 WBCS
PLATELET # BLD AUTO: 258 THOUSANDS/UL (ref 149–390)
PMV BLD AUTO: 9.7 FL (ref 8.9–12.7)
POTASSIUM SERPL-SCNC: 4.8 MMOL/L (ref 3.5–5.3)
PROT SERPL-MCNC: 7.3 G/DL (ref 6.4–8.2)
RBC # BLD AUTO: 4.01 MILLION/UL (ref 3.81–5.12)
SODIUM SERPL-SCNC: 141 MMOL/L (ref 136–145)
TRIGL SERPL-MCNC: 206 MG/DL
TSH SERPL DL<=0.05 MIU/L-ACNC: 0.88 UIU/ML (ref 0.36–3.74)
WBC # BLD AUTO: 6.97 THOUSAND/UL (ref 4.31–10.16)

## 2021-07-16 PROCEDURE — 80053 COMPREHEN METABOLIC PANEL: CPT

## 2021-07-16 PROCEDURE — 80061 LIPID PANEL: CPT

## 2021-07-16 PROCEDURE — 85025 COMPLETE CBC W/AUTO DIFF WBC: CPT

## 2021-07-16 PROCEDURE — 36415 COLL VENOUS BLD VENIPUNCTURE: CPT

## 2021-07-16 PROCEDURE — 84443 ASSAY THYROID STIM HORMONE: CPT

## 2021-07-16 PROCEDURE — 83036 HEMOGLOBIN GLYCOSYLATED A1C: CPT

## 2021-09-01 ENCOUNTER — OFFICE VISIT (OUTPATIENT)
Dept: INTERNAL MEDICINE CLINIC | Facility: CLINIC | Age: 76
End: 2021-09-01
Payer: COMMERCIAL

## 2021-09-01 VITALS
WEIGHT: 173.2 LBS | BODY MASS INDEX: 29.57 KG/M2 | DIASTOLIC BLOOD PRESSURE: 72 MMHG | HEIGHT: 64 IN | RESPIRATION RATE: 12 BRPM | TEMPERATURE: 98.4 F | OXYGEN SATURATION: 98 % | HEART RATE: 74 BPM | SYSTOLIC BLOOD PRESSURE: 128 MMHG

## 2021-09-01 DIAGNOSIS — F32.9 REACTIVE DEPRESSION: ICD-10-CM

## 2021-09-01 DIAGNOSIS — H60.91 OTITIS EXTERNA OF RIGHT EAR, UNSPECIFIED CHRONICITY, UNSPECIFIED TYPE: Primary | ICD-10-CM

## 2021-09-01 DIAGNOSIS — E03.9 ACQUIRED HYPOTHYROIDISM: ICD-10-CM

## 2021-09-01 DIAGNOSIS — E78.2 MIXED HYPERLIPIDEMIA: ICD-10-CM

## 2021-09-01 DIAGNOSIS — E11.9 TYPE 2 DIABETES MELLITUS WITHOUT COMPLICATION, WITHOUT LONG-TERM CURRENT USE OF INSULIN (HCC): ICD-10-CM

## 2021-09-01 DIAGNOSIS — H61.23 BILATERAL IMPACTED CERUMEN: ICD-10-CM

## 2021-09-01 DIAGNOSIS — I10 ESSENTIAL HYPERTENSION: ICD-10-CM

## 2021-09-01 PROCEDURE — 1036F TOBACCO NON-USER: CPT | Performed by: INTERNAL MEDICINE

## 2021-09-01 PROCEDURE — 69210 REMOVE IMPACTED EAR WAX UNI: CPT | Performed by: INTERNAL MEDICINE

## 2021-09-01 PROCEDURE — 3078F DIAST BP <80 MM HG: CPT | Performed by: INTERNAL MEDICINE

## 2021-09-01 PROCEDURE — 1160F RVW MEDS BY RX/DR IN RCRD: CPT | Performed by: INTERNAL MEDICINE

## 2021-09-01 PROCEDURE — 3288F FALL RISK ASSESSMENT DOCD: CPT | Performed by: INTERNAL MEDICINE

## 2021-09-01 PROCEDURE — 1101F PT FALLS ASSESS-DOCD LE1/YR: CPT | Performed by: INTERNAL MEDICINE

## 2021-09-01 PROCEDURE — 3074F SYST BP LT 130 MM HG: CPT | Performed by: INTERNAL MEDICINE

## 2021-09-01 PROCEDURE — 99214 OFFICE O/P EST MOD 30 MIN: CPT | Performed by: INTERNAL MEDICINE

## 2021-09-01 NOTE — ASSESSMENT & PLAN NOTE
Blood pressure seems to be well controlled    Continue your current regimen of Lotrel and spirinolactone

## 2021-09-01 NOTE — ASSESSMENT & PLAN NOTE
Today on examination-impacted cerumen bilateral ears more pronounced in the left ear  Ears were cleaned    Patient has been prescribed Cortisporin ear drops

## 2021-09-01 NOTE — ASSESSMENT & PLAN NOTE
Lab Results   Component Value Date    HGBA1C 6 9 (H) 07/16/2021   Blood sugars seem to be pretty well controlled  Continue current regimen of metformin    Watch diet, regular exercise

## 2021-09-01 NOTE — PROGRESS NOTES
Assessment/Plan:    Hypertension  Blood pressure seems to be well controlled  Continue your current regimen of Lotrel and spirinolactone    Diabetes ([facility] [address] )    Lab Results   Component Value Date    HGBA1C 6 9 (H) 07/16/2021   Blood sugars seem to be pretty well controlled  Continue current regimen of metformin  Watch diet, regular exercise    Hyperlipidemia  Lipid profile reviewed  Continue current dose of atorvastatin  Will repeat labs in 6 months    Hypothyroidism  Continue levothyroxine  Will repeat labs in 6 months    Reactive depression  Very well controlled with Lexapro  Otitis externa of right ear  Today on examination-impacted cerumen bilateral ears more pronounced in the left ear  Ears were cleaned  Patient has been prescribed Cortisporin ear drops       Diagnoses and all orders for this visit:    Otitis externa of right ear, unspecified chronicity, unspecified type  -     neomycin-colistin-hydrocortisone-thonzonium (CORTISPORIN TC) 0 33-0 3-1-0 05 % otic suspension; Administer 3 drops into both ears 4 (four) times a day    Reactive depression    Mixed hyperlipidemia  -     Lipid panel; Future    Essential hypertension  -     CBC and differential; Future  -     Comprehensive metabolic panel; Future    Type 2 diabetes mellitus without complication, without long-term current use of insulin (HCC)  -     CBC and differential; Future  -     Comprehensive metabolic panel; Future  -     HEMOGLOBIN A1C W/ EAG ESTIMATION; Future    Acquired hypothyroidism  -     TSH, 3rd generation with Free T4 reflex; Future          Subjective:      Patient ID: Stacy Barnett is a 68 y o  female  HPI  This is a 72-year-old female with past medical history of hypertension, hypothyroidism, diabetes mellitus not on insulin therapy, hyperlipidemia, depression who is here in the clinic for routine follow-up  Patient offers no new complaints  Patient reports fullness in her ears and feels as if wax is clogged up    Reports that blood glucose levels are in the range of 140s  Last A1c reviewed with the patient of 6 9  Otherwise no active complaints  Patient reports compliance with her medications  Reports symptoms of depression and anxiety are well controlled with Lexapro  Advised will repeat lab work in 6 months      The following portions of the patient's history were reviewed and updated as appropriate: allergies, current medications, past family history, past medical history, past social history, past surgical history and problem list     Review of Systems   Constitutional: Negative for activity change, appetite change, chills and fever  HENT: Negative for congestion, dental problem, ear pain and sore throat  Ear Fullness   Eyes: Negative for pain, discharge, itching and visual disturbance  Respiratory: Negative for apnea, cough, chest tightness and shortness of breath  Cardiovascular: Negative for chest pain and palpitations  Gastrointestinal: Negative for abdominal pain and vomiting  Genitourinary: Negative for dysuria and hematuria  Musculoskeletal: Negative for arthralgias and back pain  Skin: Negative for color change and rash  Neurological: Negative for seizures and syncope  All other systems reviewed and are negative  Objective:      /72 (BP Location: Left arm, Patient Position: Sitting, Cuff Size: Standard)   Pulse 74   Temp 98 4 °F (36 9 °C) (Tympanic)   Resp 12   Ht 5' 4" (1 626 m)   Wt 78 6 kg (173 lb 3 2 oz)   SpO2 98%   BMI 29 73 kg/m²          Physical Exam  Constitutional:       Appearance: Normal appearance  HENT:      Right Ear: There is impacted cerumen  Left Ear: There is impacted cerumen  Cardiovascular:      Rate and Rhythm: Normal rate  Pulses: Normal pulses  Pulmonary:      Effort: Pulmonary effort is normal    Abdominal:      General: Abdomen is flat  Musculoskeletal:         General: Normal range of motion        Cervical back: Normal range of motion  Right lower leg: No edema  Left lower leg: No edema  Skin:     General: Skin is warm  Capillary Refill: Capillary refill takes less than 2 seconds  Neurological:      General: No focal deficit present  Mental Status: She is alert and oriented to person, place, and time  Ear cerumen removal    Date/Time: 9/1/2021 5:38 PM  Performed by: Andrew Spear MD  Authorized by: Andrew Spear MD   Universal Protocol:  Procedure performed by:  Consent: Verbal consent obtained  Consent given by: patient  Patient understanding: patient states understanding of the procedure being performed      Patient location:  Clinic  Procedure details:     Location:  L ear and R ear    Procedure type: irrigation with instrumentation      Approach:  External  Post-procedure details:     Complication:  Macerated skin    Patient tolerance of procedure:   Tolerated well, no immediate complications

## 2021-09-01 NOTE — PATIENT INSTRUCTIONS
Monitor your blood sugar levels  Advised regular exercise   You are advised to use Corticosporin ear drops 3 drops in both ears  If you notice any ear discharge or if you have fevers please call our office  Repeat Labs in 6 months    Hypertension  Blood pressure seems to be well controlled  Continue your current regimen of Lotrel and spirinolactone    Diabetes ([facility] [address] )    Lab Results   Component Value Date    HGBA1C 6 9 (H) 07/16/2021   Blood sugars seem to be pretty well controlled  Continue current regimen of metformin  Watch diet, regular exercise    Hyperlipidemia  Lipid profile reviewed  Continue current dose of atorvastatin  Will repeat labs in 6 months    Hypothyroidism  Continue levothyroxine  Will repeat labs in 6 months    Reactive depression  Very well controlled with Lexapro

## 2021-11-10 ENCOUNTER — IMMUNIZATIONS (OUTPATIENT)
Dept: FAMILY MEDICINE CLINIC | Facility: HOSPITAL | Age: 76
End: 2021-11-10

## 2021-11-10 DIAGNOSIS — Z23 ENCOUNTER FOR IMMUNIZATION: Primary | ICD-10-CM

## 2021-11-10 PROCEDURE — 91306 COVID-19 MODERNA VACC 0.25 ML BOOSTER: CPT

## 2021-11-10 PROCEDURE — 0013A COVID-19 MODERNA VACC 0.25 ML BOOSTER: CPT

## 2021-11-13 DIAGNOSIS — F32.9 REACTIVE DEPRESSION: ICD-10-CM

## 2021-11-13 RX ORDER — ESCITALOPRAM OXALATE 5 MG/1
TABLET ORAL
Qty: 90 TABLET | Refills: 1 | Status: SHIPPED | OUTPATIENT
Start: 2021-11-13 | End: 2022-05-11

## 2021-12-05 DIAGNOSIS — E11.9 TYPE 2 DIABETES MELLITUS WITHOUT COMPLICATION, WITHOUT LONG-TERM CURRENT USE OF INSULIN (HCC): ICD-10-CM

## 2021-12-05 RX ORDER — METFORMIN HYDROCHLORIDE 500 MG/1
TABLET, EXTENDED RELEASE ORAL
Qty: 360 TABLET | Refills: 1 | Status: SHIPPED | OUTPATIENT
Start: 2021-12-05 | End: 2022-05-26

## 2022-02-04 DIAGNOSIS — E78.2 MIXED HYPERLIPIDEMIA: ICD-10-CM

## 2022-02-04 RX ORDER — ATORVASTATIN CALCIUM 10 MG/1
TABLET, FILM COATED ORAL
Qty: 90 TABLET | Refills: 3 | Status: SHIPPED | OUTPATIENT
Start: 2022-02-04

## 2022-02-14 ENCOUNTER — APPOINTMENT (EMERGENCY)
Dept: RADIOLOGY | Facility: HOSPITAL | Age: 77
End: 2022-02-14
Payer: COMMERCIAL

## 2022-02-14 ENCOUNTER — HOSPITAL ENCOUNTER (EMERGENCY)
Facility: HOSPITAL | Age: 77
Discharge: HOME | End: 2022-02-14
Attending: EMERGENCY MEDICINE | Admitting: EMERGENCY MEDICINE
Payer: COMMERCIAL

## 2022-02-14 VITALS
HEART RATE: 71 BPM | RESPIRATION RATE: 18 BRPM | SYSTOLIC BLOOD PRESSURE: 139 MMHG | OXYGEN SATURATION: 98 % | TEMPERATURE: 98.2 F | DIASTOLIC BLOOD PRESSURE: 62 MMHG

## 2022-02-14 DIAGNOSIS — L03.119 CELLULITIS OF FOOT: Primary | ICD-10-CM

## 2022-02-14 LAB
ALBUMIN SERPL BCP-MCNC: 4.1 G/DL (ref 3.5–5)
ALP SERPL-CCNC: 48 U/L (ref 46–116)
ALT SERPL W P-5'-P-CCNC: 41 U/L (ref 12–78)
ANION GAP SERPL CALCULATED.3IONS-SCNC: 9 MMOL/L (ref 4–13)
AST SERPL W P-5'-P-CCNC: 27 U/L (ref 5–45)
BASOPHILS # BLD AUTO: 0.04 THOUSANDS/ΜL (ref 0–0.1)
BASOPHILS NFR BLD AUTO: 0 % (ref 0–1)
BILIRUB SERPL-MCNC: 0.43 MG/DL (ref 0.2–1)
BUN SERPL-MCNC: 23 MG/DL (ref 5–25)
CALCIUM SERPL-MCNC: 10.1 MG/DL (ref 8.3–10.1)
CHLORIDE SERPL-SCNC: 104 MMOL/L (ref 100–108)
CO2 SERPL-SCNC: 27 MMOL/L (ref 21–32)
CREAT SERPL-MCNC: 1.07 MG/DL (ref 0.6–1.3)
CRP SERPL HS-MCNC: 4.55 MG/L
EOSINOPHIL # BLD AUTO: 0.35 THOUSAND/ΜL (ref 0–0.61)
EOSINOPHIL NFR BLD AUTO: 4 % (ref 0–6)
ERYTHROCYTE [DISTWIDTH] IN BLOOD BY AUTOMATED COUNT: 12.4 % (ref 11.6–15.1)
ERYTHROCYTE [SEDIMENTATION RATE] IN BLOOD: 12 MM/HOUR (ref 0–29)
GFR SERPL CREATININE-BSD FRML MDRD: 50 ML/MIN/1.73SQ M
GLUCOSE SERPL-MCNC: 125 MG/DL (ref 65–140)
HCT VFR BLD AUTO: 41.6 % (ref 34.8–46.1)
HGB BLD-MCNC: 13.4 G/DL (ref 11.5–15.4)
IMM GRANULOCYTES # BLD AUTO: 0.03 THOUSAND/UL (ref 0–0.2)
IMM GRANULOCYTES NFR BLD AUTO: 0 % (ref 0–2)
LACTATE SERPL-SCNC: 1.8 MMOL/L (ref 0.5–2)
LACTATE SERPL-SCNC: 3.2 MMOL/L (ref 0.5–2)
LYMPHOCYTES # BLD AUTO: 1.73 THOUSANDS/ΜL (ref 0.6–4.47)
LYMPHOCYTES NFR BLD AUTO: 17 % (ref 14–44)
MCH RBC QN AUTO: 31.8 PG (ref 26.8–34.3)
MCHC RBC AUTO-ENTMCNC: 32.2 G/DL (ref 31.4–37.4)
MCV RBC AUTO: 99 FL (ref 82–98)
MONOCYTES # BLD AUTO: 0.66 THOUSAND/ΜL (ref 0.17–1.22)
MONOCYTES NFR BLD AUTO: 7 % (ref 4–12)
NEUTROPHILS # BLD AUTO: 7.18 THOUSANDS/ΜL (ref 1.85–7.62)
NEUTS SEG NFR BLD AUTO: 72 % (ref 43–75)
NRBC BLD AUTO-RTO: 0 /100 WBCS
PLATELET # BLD AUTO: 255 THOUSANDS/UL (ref 149–390)
PMV BLD AUTO: 9.6 FL (ref 8.9–12.7)
POTASSIUM SERPL-SCNC: 4.7 MMOL/L (ref 3.5–5.3)
PROT SERPL-MCNC: 7.6 G/DL (ref 6.4–8.2)
RBC # BLD AUTO: 4.22 MILLION/UL (ref 3.81–5.12)
SODIUM SERPL-SCNC: 140 MMOL/L (ref 136–145)
WBC # BLD AUTO: 9.99 THOUSAND/UL (ref 4.31–10.16)

## 2022-02-14 PROCEDURE — 96366 THER/PROPH/DIAG IV INF ADDON: CPT

## 2022-02-14 PROCEDURE — 85025 COMPLETE CBC W/AUTO DIFF WBC: CPT | Performed by: EMERGENCY MEDICINE

## 2022-02-14 PROCEDURE — 86141 C-REACTIVE PROTEIN HS: CPT | Performed by: EMERGENCY MEDICINE

## 2022-02-14 PROCEDURE — 36415 COLL VENOUS BLD VENIPUNCTURE: CPT | Performed by: EMERGENCY MEDICINE

## 2022-02-14 PROCEDURE — 99285 EMERGENCY DEPT VISIT HI MDM: CPT | Performed by: EMERGENCY MEDICINE

## 2022-02-14 PROCEDURE — 99284 EMERGENCY DEPT VISIT MOD MDM: CPT

## 2022-02-14 PROCEDURE — 73630 X-RAY EXAM OF FOOT: CPT

## 2022-02-14 PROCEDURE — 85652 RBC SED RATE AUTOMATED: CPT | Performed by: EMERGENCY MEDICINE

## 2022-02-14 PROCEDURE — 96361 HYDRATE IV INFUSION ADD-ON: CPT

## 2022-02-14 PROCEDURE — 96365 THER/PROPH/DIAG IV INF INIT: CPT

## 2022-02-14 PROCEDURE — 83605 ASSAY OF LACTIC ACID: CPT | Performed by: EMERGENCY MEDICINE

## 2022-02-14 PROCEDURE — 80053 COMPREHEN METABOLIC PANEL: CPT | Performed by: EMERGENCY MEDICINE

## 2022-02-14 RX ORDER — CEPHALEXIN 500 MG/1
500 CAPSULE ORAL EVERY 6 HOURS SCHEDULED
Qty: 28 CAPSULE | Refills: 0 | Status: SHIPPED | OUTPATIENT
Start: 2022-02-14 | End: 2022-02-21

## 2022-02-14 RX ADMIN — CEFTRIAXONE 2000 MG: 2 INJECTION, POWDER, FOR SOLUTION INTRAMUSCULAR; INTRAVENOUS at 13:51

## 2022-02-14 RX ADMIN — SODIUM CHLORIDE 1000 ML: 0.9 INJECTION, SOLUTION INTRAVENOUS at 13:17

## 2022-02-14 NOTE — DISCHARGE INSTRUCTIONS
Take the antibiotics as prescribed  Follow-up with Podiatry  Return the emergency department for any new or worsening symptoms

## 2022-02-14 NOTE — ED PROVIDER NOTES
Pt Name: Geno Valentin  MRN: [id number]  [place] 1945  Age/Sex: 68 y o  female  Date of evaluation: 2/14/2022  PCP: Jin Gentile MD    [address] [place] [address]    Chief Complaint   Patient presents with    Foot Pain     left foot became hot and itchy yesterday and now its swollen and "split open"          HPI and MDM    68 y o  female presenting with left foot pain  Patient states yesterday morning she started having itchiness and some mild redness of her left foot around her big toe and her 2nd toe  She put on Benadryl ointment for the itchiness  Today she noticed in the morning that it is much more red, swollen, redness is extending proximally, there is an open wound in between her big toe and her 2nd toe  There is blisters on her 2nd and 3rd toes  Denies fevers, chills  Denies any injuries to the foot  She is diabetic  States she looked at her foot yesterday and saw no open wounds  No insect bites  Exam is concerning for cellulitis, lactic acid elevated, came down with IV fluids  Given IV antibiotics  Patient does not appear toxic  Discussed with Podiatry, Dr Nitesh Wilde colorectal read knee inked drying out the area with Betadine  Betadine was applied and dressing was applied by RN  Provided prescription for antibiotics  Advised close follow-up with Podiatry  Return precautions discussed, patient verbalized understanding and is in agreement with plan                Medications   sodium chloride 0 9 % bolus 1,000 mL (0 mL Intravenous Stopped 2/14/22 1613)   cefTRIAXone (ROCEPHIN) 2,000 mg in dextrose 5 % 50 mL IVPB (0 mg Intravenous Stopped 2/14/22 1613)         Past Medical and Surgical History    Past Medical History:   Diagnosis Date    Arthritis     Colon polyp     Diabetes mellitus ([address] )     Disease of thyroid gland     Elbow fracture     Foot fracture, left     GERD (gastroesophageal reflux disease)     occasional    Hypertension     Irritable bowel syndrome        Past Surgical History:   Procedure Laterality Date    APPENDECTOMY      CHOLECYSTECTOMY      DILATION AND CURETTAGE OF UTERUS  1995    IN CMBND ANTERPOST COLPORRAPHY W/CYSTO N/A 7/29/2019    Procedure: A&P COLPORRHAPHY; GRAFT;  Surgeon: Radha Castellano MD;  [facility];  Service: UroGynecology           IN CYSTOURETHROSCOPY N/A 7/29/2019    Procedure: Ramon Gey;  Surgeon: Radha Castellano MD;  [facility];  Service: UroGynecology           IN [facility] [address] N/A 7/29/2019    Procedure: VE COLPOPEXY;  Surgeon: Radha Castellano MD;  [facility];  Service: UroGynecology           IN SLING OPER STRES INCONTINENCE N/A 7/29/2019    Procedure: PUBOVAGINAL SLING;  Surgeon: Radha Castellano MD;  [facility];  Service: UroGynecology           TONSILLECTOMY      TONSILLECTOMY         Family History   Problem Relation Age of Onset    Colon cancer Mother         Bowel    Asthma Mother     Heart failure Father         Congestive    Ulcers Father     Heart attack Maternal Grandfather         Myocardial infarction    Stomach cancer Paternal Grandmother     Breast cancer Paternal Aunt     Ovarian cancer Neg Hx        Social History     Tobacco Use    Smoking status: Never Smoker    Smokeless tobacco: Never Used   Vaping Use    Vaping Use: Never used   Substance Use Topics    Alcohol use: Yes     Comment: rare, maybe once a year    Drug use: No           Allergies    Allergies   Allergen Reactions    Penicillins      Was child- reaction unknown       Home Medications    Prior to Admission medications    Medication Sig Start Date End Date Taking?  Authorizing Provider   acetaminophen (TYLENOL) 325 mg tablet Take 2 tablets (650 mg total) by mouth every 6 (six) hours as needed for mild pain 7/30/19   Kimber De La O MD   amLODIPine-benazepril (LOTREL 5-10) 5-10 MG per capsule TAKE 1 CAPSULE BY MOUTH EVERY DAY 6/16/21   Corry Chapman MD   atorvastatin (LIPITOR) 10 mg tablet TAKE 1 TABLET BY MOUTH EVERY DAY 2/4/22   Gilbert Clay MD   escitalopram (LEXAPRO) 5 mg tablet TAKE 1 TABLET BY MOUTH EVERY DAY 11/13/21   MD Chirag Bundy ([address]) [address] [facility] TEST DAILY 11/4/19   Gilbert Clay MD   levothyroxine 100 mcg tablet TAKE 1 TABLET BY MOUTH EVERY DAY 6/17/21   IRVING Paniagua   metFORMIN (GLUCOPHAGE-XR) 500 mg 24 hr tablet TAKE 4 TABLETS BY MOUTH EVERY DAY 12/5/21   Gilbert Clay MD   Multiple Vitamins-Minerals (CENTRUM SILVER) CHEW Chew 1 tablet daily    Historical Provider, MD   neomycin-colistin-hydrocortisone-thonzonium (CORTISPORIN TC) 0 33-0 3-1-0 05 % otic suspension Administer 3 drops into both ears 4 (four) times a day 9/1/21   Gilson Lunsford MD   ONE TOUCH ULTRA TEST test strip 1 EACH BY OTHER ROUTE DAILY USE AS INSTRUCTED 11/6/19   Albino Linger, MD   OneTouch Delica Lancets 47A MISC TEST 1 DAILY AS INSTRUCTED 6/8/20   Gilbert Clay MD   spironolactone (ALDACTONE) 25 mg tablet TAKE 1 TABLET BY MOUTH EVERY DAY 6/16/21   Gilbert Clay MD   valACYclovir (VALTREX) 1,000 mg tablet Take 1 tablet (1,000 mg total) by mouth as needed (as needed) 8/13/20 1/13/21  Gilbert Clay MD           Review of Systems    Review of Systems   Constitutional: Negative for chills and fever  HENT: Negative for rhinorrhea and sore throat  Eyes: Negative for pain and visual disturbance  Respiratory: Negative for cough and shortness of breath  Cardiovascular: Negative for chest pain and leg swelling  Gastrointestinal: Negative for abdominal pain, nausea and vomiting  Genitourinary: Negative for dysuria and hematuria  Musculoskeletal: Negative for back pain and myalgias  Skin: Positive for color change and wound  Left foot edema, blistering, swelling   Neurological: Negative for syncope and headaches             Physical Exam      ED Triage Vitals [02/14/22 1053]   Temperature Pulse Respirations Blood Pressure SpO2   98 2 °F (36 8 °C) 96 18 127/60 97 %      Temp Source Heart Rate Source Patient Position - Orthostatic VS BP Location FiO2 (%)   Oral Monitor Sitting Left arm --      Pain Score       --               Physical Exam  Constitutional:       General: She is not in acute distress  Appearance: She is not ill-appearing  HENT:      Head: Normocephalic and atraumatic  Nose: Nose normal    Eyes:      Extraocular Movements: Extraocular movements intact  Pupils: Pupils are equal, round, and reactive to light  Cardiovascular:      Rate and Rhythm: Normal rate and regular rhythm  Pulmonary:      Effort: Pulmonary effort is normal  No respiratory distress  Breath sounds: Normal breath sounds  Abdominal:      General: There is no distension  Tenderness: There is no abdominal tenderness  Musculoskeletal:         General: Swelling present  No deformity  Cervical back: Normal range of motion and neck supple  Skin:     General: Skin is warm  Findings: Erythema present  Comments: Erythema of left foot distally, primarily over the 1st 3 toes, open wound between 1st and 2nd toe, there is an in tact blister on 3rd toe base dorsal surface, open blister on 2nd toe base dorsal surface  Erythema extending to mid foot, there is edema as well  It is tender to touch  Neurological:      Mental Status: She is alert and oriented to person, place, and time  Mental status is at baseline  Sensory: No sensory deficit  Motor: No weakness  Psychiatric:         Mood and Affect: Mood normal               Diagnostic Results      Labs:    Results Reviewed     Procedure Component Value Units Date/Time    Lactic acid 2 Hours [[id number]]  (Normal) Collected: 02/14/22 1427    Lab Status: Final result Specimen: Blood from Arm, Right Updated: 02/14/22 1457     LACTIC ACID 1 8 mmol/L     Narrative:      Result may be elevated if tourniquet was used during collection      High sensitivity CRP [[id number]] Collected: 02/14/22 1225    Lab Status: Final result Specimen: Blood from Arm, Left Updated: 02/14/22 1315     CRP, High Sensitivity 4 55 mg/L     Narrative:            HsCRP Level       Relative Risk           <1 0 mg/L          Low           1 0 to 3 0 mg/L    Average           >3 0 mg/L          High        Lactic acid [[id number]]  (Abnormal) Collected: 02/14/22 1225    Lab Status: Final result Specimen: Blood from Arm, Left Updated: 02/14/22 1312     LACTIC ACID 3 2 mmol/L     Narrative:      Result may be elevated if tourniquet was used during collection      Comprehensive metabolic panel [[id number]] Collected: 02/14/22 1225    Lab Status: Final result Specimen: Blood from Arm, Left Updated: 02/14/22 1305     Sodium 140 mmol/L      Potassium 4 7 mmol/L      Chloride 104 mmol/L      CO2 27 mmol/L      ANION GAP 9 mmol/L      BUN 23 mg/dL      Creatinine 1 07 mg/dL      Glucose 125 mg/dL      Calcium 10 1 mg/dL      AST 27 U/L      ALT 41 U/L      Alkaline Phosphatase 48 U/L      Total Protein 7 6 g/dL      Albumin 4 1 g/dL      Total Bilirubin 0 43 mg/dL      eGFR 50 ml/min/1 73sq m     Narrative:      [place] guidelines for Chronic Kidney Disease (CKD):     Stage 1 with normal or high GFR (GFR > 90 mL/min/1 73 square meters)    Stage 2 Mild CKD (GFR = 60-89 mL/min/1 73 square meters)    Stage 3A Moderate CKD (GFR = 45-59 mL/min/1 73 square meters)    Stage 3B Moderate CKD (GFR = 30-44 mL/min/1 73 square meters)    Stage 4 Severe CKD (GFR = 15-29 mL/min/1 73 square meters)    Stage 5 End Stage CKD (GFR <15 mL/min/1 73 square meters)  Note: GFR calculation is accurate only with a steady state creatinine    Sedimentation rate, automated [[id number]]  (Normal) Collected: 02/14/22 1225    Lab Status: Final result Specimen: Blood from Arm, Left Updated: 02/14/22 1248     Sed Rate 12 mm/hour     CBC and differential [[id number]]  (Abnormal) Collected: 02/14/22 1225    Lab Status: Final result Specimen: Blood from Arm, Left Updated: 02/14/22 1241     WBC 9 99 Thousand/uL      RBC 4 22 Million/uL      Hemoglobin 13 4 g/dL      Hematocrit 41 6 %      MCV 99 fL      MCH 31 8 pg      MCHC 32 2 g/dL      RDW 12 4 %      MPV 9 6 fL      Platelets 699 Thousands/uL      nRBC 0 /100 WBCs      Neutrophils Relative 72 %      Immat GRANS % 0 %      Lymphocytes Relative 17 %      Monocytes Relative 7 %      Eosinophils Relative 4 %      Basophils Relative 0 %      Neutrophils Absolute 7 18 Thousands/µL      Immature Grans Absolute 0 03 Thousand/uL      Lymphocytes Absolute 1 73 Thousands/µL      Monocytes Absolute 0 66 Thousand/µL      Eosinophils Absolute 0 35 Thousand/µL      Basophils Absolute 0 04 Thousands/µL           All labs reviewed and utilized in the medical decision making process    Radiology:    XR foot 3+ views LEFT   Final Result   Heel spurs   Degenerative changes first MTP joint   No acute osseous abnormality  Workstation performed: [id number]             All radiology studies independently viewed by me and interpreted by the radiologist     Procedure    Procedures        FINAL IMPRESSION    Final diagnoses:   Cellulitis of foot         DISPOSITION    Time reflects when diagnosis was documented in both MDM as applicable and the Disposition within this note     Time User Action Codes Description Comment    2/14/2022  3:41 PM Karla Addiosn, [address] [place] [address] [[id number]] Cellulitis of foot       ED Disposition     ED Disposition Condition Date/Time Comment    Discharge Stable Mon Feb 14, 2022  3:41 PM Rafael Simon discharge to home/self care              Follow-up Information     Follow up With Specialties Details Why Contact [facility] Call today For follow up appointment within 1 week [address] [facility]  [phone]              PATIENT REFERRED TO:    [address] [place], [place] [address]robby Diggs[address]  [phone]    Call today  For follow up appointment within 1 week      DISCHARGE MEDICATIONS:    Discharge Medication List as of 2/14/2022  3:44 PM      START taking these medications    Details   cephalexin (KEFLEX) 500 mg capsule Take 1 capsule (500 mg total) by mouth every 6 (six) hours for 7 days, Starting Mon 2/14/2022, Until Mon 2/21/2022, Print         CONTINUE these medications which have NOT CHANGED    Details   acetaminophen (TYLENOL) 325 mg tablet Take 2 tablets (650 mg total) by mouth every 6 (six) hours as needed for mild pain, Starting Tue 7/30/2019, No Print      amLODIPine-benazepril (LOTREL 5-10) 5-10 MG per capsule TAKE 1 CAPSULE BY MOUTH EVERY DAY, Normal      atorvastatin (LIPITOR) 10 mg tablet TAKE 1 TABLET BY MOUTH EVERY DAY, Normal      escitalopram (LEXAPRO) 5 mg tablet TAKE 1 TABLET BY MOUTH EVERY DAY, Normal      !! Lancets (ONETOUCH DELICA PLUS [id number]) MISC TEST DAILY, Normal      levothyroxine 100 mcg tablet TAKE 1 TABLET BY MOUTH EVERY DAY, Normal      metFORMIN (GLUCOPHAGE-XR) 500 mg 24 hr tablet TAKE 4 TABLETS BY MOUTH EVERY DAY, Normal      Multiple Vitamins-Minerals (CENTRUM SILVER) CHEW Chew 1 tablet daily, Historical Med      neomycin-colistin-hydrocortisone-thonzonium (CORTISPORIN TC) 0 33-0 3-1-0 05 % otic suspension Administer 3 drops into both ears 4 (four) times a day, Starting Wed 9/1/2021, Normal      ONE TOUCH ULTRA TEST test strip 1 EACH BY OTHER ROUTE DAILY USE AS INSTRUCTED, Normal      !! OneTouch Delica Lancets 35J MISC TEST 1 DAILY AS INSTRUCTED, Normal      spironolactone (ALDACTONE) 25 mg tablet TAKE 1 TABLET BY MOUTH EVERY DAY, Normal      valACYclovir (VALTREX) 1,000 mg tablet Take 1 tablet (1,000 mg total) by mouth as needed (as needed), Starting Thu 8/13/2020, Until Wed 1/13/2021, Normal       !! - Potential duplicate medications found  Please discuss with provider  No discharge procedures on file           Danial Renee, DO        This note was partially completed using voice recognition technology, and was scanned for gross errors; however some errors may still exist  Please contact the author with any questions or requests for clarification        Desiree Ly, DO  02/14/22 0691

## 2022-03-04 ENCOUNTER — APPOINTMENT (OUTPATIENT)
Dept: LAB | Facility: HOSPITAL | Age: 77
End: 2022-03-04
Payer: COMMERCIAL

## 2022-03-04 DIAGNOSIS — I10 ESSENTIAL HYPERTENSION: ICD-10-CM

## 2022-03-04 DIAGNOSIS — E03.9 ACQUIRED HYPOTHYROIDISM: ICD-10-CM

## 2022-03-04 DIAGNOSIS — E78.2 MIXED HYPERLIPIDEMIA: ICD-10-CM

## 2022-03-04 DIAGNOSIS — E11.9 TYPE 2 DIABETES MELLITUS WITHOUT COMPLICATION, WITHOUT LONG-TERM CURRENT USE OF INSULIN (HCC): ICD-10-CM

## 2022-03-04 LAB
ALBUMIN SERPL BCP-MCNC: 4 G/DL (ref 3.5–5)
ALP SERPL-CCNC: 44 U/L (ref 46–116)
ALT SERPL W P-5'-P-CCNC: 41 U/L (ref 12–78)
ANION GAP SERPL CALCULATED.3IONS-SCNC: 10 MMOL/L (ref 4–13)
AST SERPL W P-5'-P-CCNC: 26 U/L (ref 5–45)
BASOPHILS # BLD AUTO: 0.07 THOUSANDS/ΜL (ref 0–0.1)
BASOPHILS NFR BLD AUTO: 1 % (ref 0–1)
BILIRUB SERPL-MCNC: 0.53 MG/DL (ref 0.2–1)
BUN SERPL-MCNC: 21 MG/DL (ref 5–25)
CALCIUM SERPL-MCNC: 9.1 MG/DL (ref 8.3–10.1)
CHLORIDE SERPL-SCNC: 105 MMOL/L (ref 100–108)
CHOLEST SERPL-MCNC: 121 MG/DL
CO2 SERPL-SCNC: 26 MMOL/L (ref 21–32)
CREAT SERPL-MCNC: 1.09 MG/DL (ref 0.6–1.3)
EOSINOPHIL # BLD AUTO: 0.24 THOUSAND/ΜL (ref 0–0.61)
EOSINOPHIL NFR BLD AUTO: 3 % (ref 0–6)
ERYTHROCYTE [DISTWIDTH] IN BLOOD BY AUTOMATED COUNT: 12.3 % (ref 11.6–15.1)
EST. AVERAGE GLUCOSE BLD GHB EST-MCNC: 160 MG/DL
GFR SERPL CREATININE-BSD FRML MDRD: 49 ML/MIN/1.73SQ M
GLUCOSE P FAST SERPL-MCNC: 182 MG/DL (ref 65–99)
HBA1C MFR BLD: 7.2 %
HCT VFR BLD AUTO: 39.5 % (ref 34.8–46.1)
HDLC SERPL-MCNC: 47 MG/DL
HGB BLD-MCNC: 13.5 G/DL (ref 11.5–15.4)
IMM GRANULOCYTES # BLD AUTO: 0.03 THOUSAND/UL (ref 0–0.2)
IMM GRANULOCYTES NFR BLD AUTO: 0 % (ref 0–2)
LDLC SERPL CALC-MCNC: 46 MG/DL (ref 0–100)
LYMPHOCYTES # BLD AUTO: 1.67 THOUSANDS/ΜL (ref 0.6–4.47)
LYMPHOCYTES NFR BLD AUTO: 24 % (ref 14–44)
MCH RBC QN AUTO: 32.8 PG (ref 26.8–34.3)
MCHC RBC AUTO-ENTMCNC: 34.2 G/DL (ref 31.4–37.4)
MCV RBC AUTO: 96 FL (ref 82–98)
MONOCYTES # BLD AUTO: 0.44 THOUSAND/ΜL (ref 0.17–1.22)
MONOCYTES NFR BLD AUTO: 6 % (ref 4–12)
NEUTROPHILS # BLD AUTO: 4.66 THOUSANDS/ΜL (ref 1.85–7.62)
NEUTS SEG NFR BLD AUTO: 66 % (ref 43–75)
NONHDLC SERPL-MCNC: 74 MG/DL
NRBC BLD AUTO-RTO: 0 /100 WBCS
PLATELET # BLD AUTO: 258 THOUSANDS/UL (ref 149–390)
PMV BLD AUTO: 9.5 FL (ref 8.9–12.7)
POTASSIUM SERPL-SCNC: 4.8 MMOL/L (ref 3.5–5.3)
PROT SERPL-MCNC: 7.6 G/DL (ref 6.4–8.2)
RBC # BLD AUTO: 4.12 MILLION/UL (ref 3.81–5.12)
SODIUM SERPL-SCNC: 141 MMOL/L (ref 136–145)
TRIGL SERPL-MCNC: 139 MG/DL
TSH SERPL DL<=0.05 MIU/L-ACNC: 0.69 UIU/ML (ref 0.36–3.74)
WBC # BLD AUTO: 7.11 THOUSAND/UL (ref 4.31–10.16)

## 2022-03-04 PROCEDURE — 80061 LIPID PANEL: CPT

## 2022-03-04 PROCEDURE — 36415 COLL VENOUS BLD VENIPUNCTURE: CPT

## 2022-03-04 PROCEDURE — 85025 COMPLETE CBC W/AUTO DIFF WBC: CPT

## 2022-03-04 PROCEDURE — 83036 HEMOGLOBIN GLYCOSYLATED A1C: CPT

## 2022-03-04 PROCEDURE — 84443 ASSAY THYROID STIM HORMONE: CPT

## 2022-03-04 PROCEDURE — 80053 COMPREHEN METABOLIC PANEL: CPT

## 2022-03-08 ENCOUNTER — OFFICE VISIT (OUTPATIENT)
Dept: INTERNAL MEDICINE CLINIC | Facility: CLINIC | Age: 77
End: 2022-03-08
Payer: COMMERCIAL

## 2022-03-08 VITALS
DIASTOLIC BLOOD PRESSURE: 64 MMHG | TEMPERATURE: 98.5 F | OXYGEN SATURATION: 97 % | SYSTOLIC BLOOD PRESSURE: 138 MMHG | HEIGHT: 64 IN | RESPIRATION RATE: 16 BRPM | BODY MASS INDEX: 30.25 KG/M2 | WEIGHT: 177.2 LBS | HEART RATE: 68 BPM

## 2022-03-08 DIAGNOSIS — F32.9 REACTIVE DEPRESSION: ICD-10-CM

## 2022-03-08 DIAGNOSIS — Z23 ENCOUNTER FOR IMMUNIZATION: Primary | ICD-10-CM

## 2022-03-08 DIAGNOSIS — E78.2 MIXED HYPERLIPIDEMIA: ICD-10-CM

## 2022-03-08 DIAGNOSIS — E03.9 ACQUIRED HYPOTHYROIDISM: ICD-10-CM

## 2022-03-08 DIAGNOSIS — I10 ESSENTIAL HYPERTENSION: ICD-10-CM

## 2022-03-08 DIAGNOSIS — N18.30 STAGE 3 CHRONIC KIDNEY DISEASE, UNSPECIFIED WHETHER STAGE 3A OR 3B CKD (HCC): ICD-10-CM

## 2022-03-08 DIAGNOSIS — R19.7 DIARRHEA, UNSPECIFIED TYPE: ICD-10-CM

## 2022-03-08 DIAGNOSIS — E11.9 TYPE 2 DIABETES MELLITUS WITHOUT COMPLICATION, WITHOUT LONG-TERM CURRENT USE OF INSULIN (HCC): ICD-10-CM

## 2022-03-08 DIAGNOSIS — M54.16 LUMBAR RADICULOPATHY: ICD-10-CM

## 2022-03-08 PROCEDURE — 3075F SYST BP GE 130 - 139MM HG: CPT | Performed by: INTERNAL MEDICINE

## 2022-03-08 PROCEDURE — 1160F RVW MEDS BY RX/DR IN RCRD: CPT | Performed by: INTERNAL MEDICINE

## 2022-03-08 PROCEDURE — 1036F TOBACCO NON-USER: CPT | Performed by: INTERNAL MEDICINE

## 2022-03-08 PROCEDURE — 3078F DIAST BP <80 MM HG: CPT | Performed by: INTERNAL MEDICINE

## 2022-03-08 PROCEDURE — 99214 OFFICE O/P EST MOD 30 MIN: CPT | Performed by: INTERNAL MEDICINE

## 2022-03-08 NOTE — PROGRESS NOTES
Assessment/Plan:    Diagnoses and all orders for this visit:    Encounter for immunization    Mixed hyperlipidemia    Type 2 diabetes mellitus without complication, without long-term current use of insulin ([facility])  -     CBC and differential; Future  -     Comprehensive metabolic panel; Future  -     Lipid panel; Future  -     Hemoglobin A1C; Future  -     TSH, 3rd generation with Free T4 reflex; Future    Reactive depression    Essential hypertension    Acquired hypothyroidism    Lumbar radiculopathy    Diarrhea, unspecified type    Stage 3 chronic kidney disease, unspecified whether stage 3a or 3b CKD ([facility] [address] )              Patient Instructions   Lab data reviewed in detail and compared prior    Type 2 diabetes mellitus is under slightly suboptimal control with A1c rising to 7 2  Issue complicated by diarrhea most likely related to metformin  Patient advised to add Metamucil daily and if this does not improve fecal urgency and diarrhea then go down to 1500 mg daily metformin and monitor blood sugars and send me a note through my chart with blood sugars and bowel symptomatology  Can certainly consider adding another agent if needed to optimize control and lifestyle  Hypertension stable on present regimen    Hyperlipidemia remains at goal on atorvastatin    Depression stable with Lexapro    Hypothyroidism stable on levothyroxine    Health maintenance up-to-date bone density was normal 2019 repeat 2024    Routine follow-up after labs in 6 months, sooner as needed  Subjective:      Patient ID: Nola Quan is a 68 y o  female    F/u mmp and review labs  Feeling generally well  Recovered from cellulitis 2/14, tx'd Keflex, f/u w/ Dr Beba Lin, DPM  Low back pain waxes and wanes, not doing any PT exercises  Using aleve prn  Keeping active, but no formal exercise  HTN/HPL-taking rx as directed, home bp's have been stable 120/70's  DM- Taking metformin as directed  Am sugars ave 140's    Notes diarrhea and fecal urgency, worse in am, ave 1-3 loose bm per day  OAB-stable since surgery, no urgency or incontinence  Hypothyroid taking rx as directed  Depression stable w/ lexapro           Current Outpatient Medications:     acetaminophen (TYLENOL) 325 mg tablet, Take 2 tablets (650 mg total) by mouth every 6 (six) hours as needed for mild pain, Disp: 30 tablet, Rfl: 0    amLODIPine-benazepril (LOTREL 5-10) 5-10 MG per capsule, TAKE 1 CAPSULE BY MOUTH EVERY DAY, Disp: 90 capsule, Rfl: 3    atorvastatin (LIPITOR) 10 mg tablet, TAKE 1 TABLET BY MOUTH EVERY DAY, Disp: 90 tablet, Rfl: 3    escitalopram (LEXAPRO) 5 mg tablet, TAKE 1 TABLET BY MOUTH EVERY DAY, Disp: 90 tablet, Rfl: 1    Lancets (ONETOUCH DELICA PLUS [id number]) MISC, TEST DAILY, Disp: 100 each, Rfl: 2    levothyroxine 100 mcg tablet, TAKE 1 TABLET BY MOUTH EVERY DAY, Disp: 90 tablet, Rfl: 3    metFORMIN (GLUCOPHAGE-XR) 500 mg 24 hr tablet, TAKE 4 TABLETS BY MOUTH EVERY DAY, Disp: 360 tablet, Rfl: 1    Multiple Vitamins-Minerals (CENTRUM SILVER) CHEW, Chew 1 tablet daily, Disp: , Rfl:     ONE TOUCH ULTRA TEST test strip, 1 EACH BY OTHER ROUTE DAILY USE AS INSTRUCTED, Disp: 100 each, Rfl: 2    OneTouch Delica Lancets 89E MISC, TEST 1 DAILY AS INSTRUCTED, Disp: 100 each, Rfl: 2    spironolactone (ALDACTONE) 25 mg tablet, TAKE 1 TABLET BY MOUTH EVERY DAY, Disp: 90 tablet, Rfl: 3    neomycin-colistin-hydrocortisone-thonzonium (CORTISPORIN TC) 0 33-0 3-1-0 05 % otic suspension, Administer 3 drops into both ears 4 (four) times a day, Disp: 10 mL, Rfl: 0    valACYclovir (VALTREX) 1,000 mg tablet, Take 1 tablet (1,000 mg total) by mouth as needed (as needed), Disp: 14 tablet, Rfl: 0    Recent Results (from the past 1008 hour(s))   CBC and differential    Collection Time: 02/14/22 12:25 PM   Result Value Ref Range    WBC 9 99 4 31 - 10 16 Thousand/uL    RBC 4 22 3 81 - 5 12 Million/uL    Hemoglobin 13 4 11 5 - 15 4 g/dL    Hematocrit 41 6 34 8 - 46 1 %    MCV 99 (H) 82 - 98 fL    MCH 31 8 26 8 - 34 3 pg    MCHC 32 2 31 4 - 37 4 g/dL    RDW 12 4 11 6 - 15 1 %    MPV 9 6 8 9 - 12 7 fL    Platelets 588 611 - 270 Thousands/uL    nRBC 0 /100 WBCs    Neutrophils Relative 72 43 - 75 %    Immat GRANS % 0 0 - 2 %    Lymphocytes Relative 17 14 - 44 %    Monocytes Relative 7 4 - 12 %    Eosinophils Relative 4 0 - 6 %    Basophils Relative 0 0 - 1 %    Neutrophils Absolute 7 18 1 85 - 7 62 Thousands/µL    Immature Grans Absolute 0 03 0 00 - 0 20 Thousand/uL    Lymphocytes Absolute 1 73 0 60 - 4 47 Thousands/µL    Monocytes Absolute 0 66 0 17 - 1 22 Thousand/µL    Eosinophils Absolute 0 35 0 00 - 0 61 Thousand/µL    Basophils Absolute 0 04 0 00 - 0 10 Thousands/µL   Comprehensive metabolic panel    Collection Time: 02/14/22 12:25 PM   Result Value Ref Range    Sodium 140 136 - 145 mmol/L    Potassium 4 7 3 5 - 5 3 mmol/L    Chloride 104 100 - 108 mmol/L    CO2 27 21 - 32 mmol/L    ANION GAP 9 4 - 13 mmol/L    BUN 23 5 - 25 mg/dL    Creatinine 1 07 0 60 - 1 30 mg/dL    Glucose 125 65 - 140 mg/dL    Calcium 10 1 8 3 - 10 1 mg/dL    AST 27 5 - 45 U/L    ALT 41 12 - 78 U/L    Alkaline Phosphatase 48 46 - 116 U/L    Total Protein 7 6 6 4 - 8 2 g/dL    Albumin 4 1 3 5 - 5 0 g/dL    Total Bilirubin 0 43 0 20 - 1 00 mg/dL    eGFR 50 ml/min/1 73sq m   Lactic acid    Collection Time: 02/14/22 12:25 PM   Result Value Ref Range    LACTIC ACID 3 2 (HH) 0 5 - 2 0 mmol/L   Sedimentation rate, automated    Collection Time: 02/14/22 12:25 PM   Result Value Ref Range    Sed Rate 12 0 - 29 mm/hour   High sensitivity CRP    Collection Time: 02/14/22 12:25 PM   Result Value Ref Range    CRP, High Sensitivity 4 55 mg/L   Lactic acid 2 Hours    Collection Time: 02/14/22  2:27 PM   Result Value Ref Range    LACTIC ACID 1 8 0 5 - 2 0 mmol/L   CBC and differential    Collection Time: 03/04/22 10:14 AM   Result Value Ref Range    WBC 7 11 4 31 - 10 16 Thousand/uL    RBC 4 12 3 81 - 5 12 Million/uL    Hemoglobin 13 5 11 5 - 15 4 g/dL    Hematocrit 39 5 34 8 - 46 1 %    MCV 96 82 - 98 fL    MCH 32 8 26 8 - 34 3 pg    MCHC 34 2 31 4 - 37 4 g/dL    RDW 12 3 11 6 - 15 1 %    MPV 9 5 8 9 - 12 7 fL    Platelets 589 542 - 962 Thousands/uL    nRBC 0 /100 WBCs    Neutrophils Relative 66 43 - 75 %    Immat GRANS % 0 0 - 2 %    Lymphocytes Relative 24 14 - 44 %    Monocytes Relative 6 4 - 12 %    Eosinophils Relative 3 0 - 6 %    Basophils Relative 1 0 - 1 %    Neutrophils Absolute 4 66 1 85 - 7 62 Thousands/µL    Immature Grans Absolute 0 03 0 00 - 0 20 Thousand/uL    Lymphocytes Absolute 1 67 0 60 - 4 47 Thousands/µL    Monocytes Absolute 0 44 0 17 - 1 22 Thousand/µL    Eosinophils Absolute 0 24 0 00 - 0 61 Thousand/µL    Basophils Absolute 0 07 0 00 - 0 10 Thousands/µL   Comprehensive metabolic panel    Collection Time: 03/04/22 10:14 AM   Result Value Ref Range    Sodium 141 136 - 145 mmol/L    Potassium 4 8 3 5 - 5 3 mmol/L    Chloride 105 100 - 108 mmol/L    CO2 26 21 - 32 mmol/L    ANION GAP 10 4 - 13 mmol/L    BUN 21 5 - 25 mg/dL    Creatinine 1 09 0 60 - 1 30 mg/dL    Glucose, Fasting 182 (H) 65 - 99 mg/dL    Calcium 9 1 8 3 - 10 1 mg/dL    AST 26 5 - 45 U/L    ALT 41 12 - 78 U/L    Alkaline Phosphatase 44 (L) 46 - 116 U/L    Total Protein 7 6 6 4 - 8 2 g/dL    Albumin 4 0 3 5 - 5 0 g/dL    Total Bilirubin 0 53 0 20 - 1 00 mg/dL    eGFR 49 ml/min/1 73sq m   TSH, 3rd generation with Free T4 reflex    Collection Time: 03/04/22 10:14 AM   Result Value Ref Range    TSH 3RD GENERATON 0 693 0 358 - 3 740 uIU/mL   HEMOGLOBIN A1C W/ EAG ESTIMATION    Collection Time: 03/04/22 10:14 AM   Result Value Ref Range    Hemoglobin A1C 7 2 (H) Normal 3 8-5 6%; PreDiabetic 5 7-6 4%;  Diabetic >=6 5%; Glycemic control for adults with diabetes <7 0% %     mg/dl   Lipid panel    Collection Time: 03/04/22 10:14 AM   Result Value Ref Range    Cholesterol 121 See Comment mg/dL    Triglycerides 139 See Comment mg/dL    HDL, Direct 47 (L) >=50 mg/dL    LDL Calculated 46 0 - 100 mg/dL    Non-HDL-Chol (CHOL-HDL) 74 mg/dl       The following portions of the patient's history were reviewed and updated as appropriate: allergies, current medications, past family history, past medical history, past social history, past surgical history and problem list      Review of Systems   Constitutional: Negative for appetite change, chills, diaphoresis, fatigue, fever and unexpected weight change  HENT: Negative for congestion, hearing loss and rhinorrhea  Eyes: Negative for visual disturbance  Respiratory: Negative for cough, chest tightness, shortness of breath and wheezing  Cardiovascular: Negative for chest pain, palpitations and leg swelling  Gastrointestinal: Positive for diarrhea  Negative for abdominal pain and blood in stool  Endocrine: Negative for cold intolerance, heat intolerance, polydipsia and polyuria  Genitourinary: Negative for difficulty urinating, dysuria, frequency and urgency  Musculoskeletal: Negative for arthralgias and myalgias  Skin: Negative for rash  Neurological: Negative for dizziness, weakness, light-headedness and headaches  Hematological: Does not bruise/bleed easily  Psychiatric/Behavioral: Negative for dysphoric mood and sleep disturbance  Objective:      Vitals:    03/08/22 1240   BP: 138/64   Pulse: 68   Resp: 16   Temp: 98 5 °F (36 9 °C)   SpO2: 97%          Physical Exam  Constitutional:       Appearance: She is well-developed  HENT:      Head: Normocephalic and atraumatic  Nose: Nose normal    Eyes:      General: No scleral icterus  Conjunctiva/sclera: Conjunctivae normal       Pupils: Pupils are equal, round, and reactive to light  Neck:      Thyroid: No thyromegaly  Vascular: No JVD  Trachea: No tracheal deviation  Cardiovascular:      Rate and Rhythm: Normal rate and regular rhythm        Pulses: no weak pulses          Dorsalis pedis pulses are 1+ on the right side and 1+ on the left side  Posterior tibial pulses are 1+ on the right side and 1+ on the left side  Heart sounds: No murmur heard  No friction rub  No gallop  Pulmonary:      Effort: Pulmonary effort is normal  No respiratory distress  Breath sounds: Normal breath sounds  No wheezing or rales  Musculoskeletal:         General: No deformity  Cervical back: Normal range of motion and neck supple  Feet:      Right foot:      Skin integrity: No ulcer, skin breakdown, erythema, warmth, callus or dry skin  Left foot:      Skin integrity: No ulcer, skin breakdown, erythema, warmth, callus or dry skin  Lymphadenopathy:      Cervical: No cervical adenopathy  Skin:     General: Skin is warm and dry  Coloration: Skin is not pale  Findings: No erythema or rash  Neurological:      Mental Status: She is alert and oriented to person, place, and time  Cranial Nerves: No cranial nerve deficit  Psychiatric:         Behavior: Behavior normal          Thought Content: Thought content normal          Judgment: Judgment normal      Diabetic Foot Exam    Patient's shoes and socks removed  Right Foot/Ankle   Right Foot Inspection  Skin Exam: skin normal and skin intact  No dry skin, no warmth, no callus, no erythema, no maceration, no abnormal color, no pre-ulcer, no ulcer and no callus  Toe Exam: ROM and strength within normal limits  Sensory   Proprioception: intact  Monofilament testing: intact    Vascular  Capillary refills: < 3 seconds  The right DP pulse is 1+  The right PT pulse is 1+  Left Foot/Ankle  Left Foot Inspection  Skin Exam: skin normal and skin intact  No dry skin, no warmth, no erythema, no maceration, normal color, no pre-ulcer, no ulcer and no callus  Toe Exam: ROM and strength within normal limits  Sensory   Proprioception: intact  Monofilament testing: intact    Vascular  Capillary refills: < 3 seconds  The left DP pulse is 1+   The left PT pulse is 1+      Assign Risk Category  No deformity present  No loss of protective sensation  No weak pulses  Risk: 0

## 2022-03-08 NOTE — PATIENT INSTRUCTIONS
Lab data reviewed in detail and compared prior    Type 2 diabetes mellitus is under slightly suboptimal control with A1c rising to 7 2  Issue complicated by diarrhea most likely related to metformin  Patient advised to add Metamucil daily and if this does not improve fecal urgency and diarrhea then go down to 1500 mg daily metformin and monitor blood sugars and send me a note through my chart with blood sugars and bowel symptomatology  Can certainly consider adding another agent if needed to optimize control and lifestyle  Hypertension stable on present regimen    Hyperlipidemia remains at goal on atorvastatin    Depression stable with Lexapro    Hypothyroidism stable on levothyroxine    Health maintenance up-to-date bone density was normal 2019 repeat 2024    Routine follow-up after labs in 6 months, sooner as needed

## 2022-05-04 ENCOUNTER — OFFICE VISIT (OUTPATIENT)
Dept: INTERNAL MEDICINE CLINIC | Facility: CLINIC | Age: 77
End: 2022-05-04
Payer: COMMERCIAL

## 2022-05-04 VITALS
SYSTOLIC BLOOD PRESSURE: 118 MMHG | TEMPERATURE: 97.5 F | HEIGHT: 64 IN | RESPIRATION RATE: 18 BRPM | WEIGHT: 177 LBS | OXYGEN SATURATION: 98 % | HEART RATE: 69 BPM | BODY MASS INDEX: 30.22 KG/M2 | DIASTOLIC BLOOD PRESSURE: 72 MMHG

## 2022-05-04 DIAGNOSIS — S63.8X2A SPRAIN OF CARPOMETACARPAL JOINT OF LEFT HAND, INITIAL ENCOUNTER: ICD-10-CM

## 2022-05-04 DIAGNOSIS — M25.532 LEFT WRIST PAIN: Primary | ICD-10-CM

## 2022-05-04 PROCEDURE — 99213 OFFICE O/P EST LOW 20 MIN: CPT | Performed by: INTERNAL MEDICINE

## 2022-05-04 PROCEDURE — 3725F SCREEN DEPRESSION PERFORMED: CPT | Performed by: INTERNAL MEDICINE

## 2022-05-04 NOTE — PATIENT INSTRUCTIONS
Left wrist and thumb pain likely related to [facility] arthritis and tendinitis of flexor tendons  Wrist was immobilized with Ace wrap  Patient encouraged to use the Ace wrap as much as possible over the next several days to 2 weeks as needed for pain  Start increased dose of ibuprofen 2 tablets, 400 mg 3 times daily  If symptoms worsen or fail to improve by Monday check x-rays of wrist and hand and will follow accordingly    Rest, ice, elevation are also important in addition to compression

## 2022-05-04 NOTE — PROGRESS NOTES
Assessment/Plan:    Diagnoses and all orders for this visit:    Left wrist pain  -     XR wrist 3+ vw left; Future  -     XR hand 3+ vw left; Future    Sprain of carpometacarpal joint of left hand, initial encounter  -     XR wrist 3+ vw left; Future  -     XR hand 3+ vw left; Future    Other orders  -     IBUPROFEN PO; Take by mouth every 6 (six) hours as needed for mild pain  -     psyllium (METAMUCIL) 0 52 g capsule; Take 0 52 g by mouth daily              Patient Instructions   Left wrist and thumb pain likely related to [facility] arthritis and tendinitis of flexor tendons  Wrist was immobilized with Ace wrap  Patient encouraged to use the Ace wrap as much as possible over the next several days to 2 weeks as needed for pain  Start increased dose of ibuprofen 2 tablets, 400 mg 3 times daily  If symptoms worsen or fail to improve by Monday check x-rays of wrist and hand and will follow accordingly  Rest, ice, elevation are also important in addition to compression      Subjective:      Patient ID: Eduardo Dias is a 68 y o  female    Patient presents acutely with complaint of left hand, wrist and thumb pain over the last 3 weeks  She denies any injury, trauma or overuse  She does note that she hold her phone in this hand when she does a lot of reading in the evening and perhaps it caused an overuse type injury  She has not had any fevers, chills, redness or swelling in the area  She gets some relief from ibuprofen 200 mg twice daily          Current Outpatient Medications:     amLODIPine-benazepril (LOTREL 5-10) 5-10 MG per capsule, TAKE 1 CAPSULE BY MOUTH EVERY DAY, Disp: 90 capsule, Rfl: 3    atorvastatin (LIPITOR) 10 mg tablet, TAKE 1 TABLET BY MOUTH EVERY DAY, Disp: 90 tablet, Rfl: 3    escitalopram (LEXAPRO) 5 mg tablet, TAKE 1 TABLET BY MOUTH EVERY DAY, Disp: 90 tablet, Rfl: 1    IBUPROFEN PO, Take by mouth every 6 (six) hours as needed for mild pain, Disp: , Rfl:     Lancets (ONETOUCH DELICA PLUS [id number]) MISC, TEST DAILY, Disp: 100 each, Rfl: 2    levothyroxine 100 mcg tablet, TAKE 1 TABLET BY MOUTH EVERY DAY, Disp: 90 tablet, Rfl: 3    metFORMIN (GLUCOPHAGE-XR) 500 mg 24 hr tablet, TAKE 4 TABLETS BY MOUTH EVERY DAY, Disp: 360 tablet, Rfl: 1    Multiple Vitamins-Minerals (CENTRUM SILVER) CHEW, Chew 1 tablet daily, Disp: , Rfl:     neomycin-colistin-hydrocortisone-thonzonium (CORTISPORIN TC) 0 33-0 3-1-0 05 % otic suspension, Administer 3 drops into both ears 4 (four) times a day (Patient taking differently: Administer 3 drops into both ears if needed  ), Disp: 10 mL, Rfl: 0    ONE TOUCH ULTRA TEST test strip, 1 EACH BY OTHER ROUTE DAILY USE AS INSTRUCTED, Disp: 100 each, Rfl: 2    OneTouch Delica Lancets 60U MISC, TEST 1 DAILY AS INSTRUCTED, Disp: 100 each, Rfl: 2    psyllium (METAMUCIL) 0 52 g capsule, Take 0 52 g by mouth daily, Disp: , Rfl:     spironolactone (ALDACTONE) 25 mg tablet, TAKE 1 TABLET BY MOUTH EVERY DAY, Disp: 90 tablet, Rfl: 3    valACYclovir (VALTREX) 1,000 mg tablet, Take 1 tablet (1,000 mg total) by mouth as needed (as needed), Disp: 14 tablet, Rfl: 0    acetaminophen (TYLENOL) 325 mg tablet, Take 2 tablets (650 mg total) by mouth every 6 (six) hours as needed for mild pain (Patient not taking: Reported on 5/4/2022 ), Disp: 30 tablet, Rfl: 0    No results found for this or any previous visit (from the past 1008 hour(s))  The following portions of the patient's history were reviewed and updated as appropriate: allergies, current medications, past family history, past medical history, past social history, past surgical history and problem list      Review of Systems   Constitutional: Negative for appetite change, chills, diaphoresis, fatigue, fever and unexpected weight change  HENT: Negative for congestion, hearing loss and rhinorrhea  Eyes: Negative for visual disturbance  Respiratory: Negative for cough, chest tightness, shortness of breath and wheezing  Cardiovascular: Negative for chest pain, palpitations and leg swelling  Gastrointestinal: Negative for abdominal pain and blood in stool  Endocrine: Negative for cold intolerance, heat intolerance, polydipsia and polyuria  Genitourinary: Negative for difficulty urinating, dysuria, frequency and urgency  Musculoskeletal: Positive for arthralgias  Negative for myalgias  Skin: Negative for rash  Neurological: Negative for dizziness, weakness, light-headedness and headaches  Hematological: Does not bruise/bleed easily  Psychiatric/Behavioral: Negative for dysphoric mood and sleep disturbance  Objective:      Vitals:    05/04/22 1648   BP: 118/72   Pulse: 69   Resp: 18   Temp: 97 5 °F (36 4 °C)   SpO2: 98%          Physical Exam  Constitutional:       Appearance: She is well-developed  HENT:      Head: Normocephalic and atraumatic  Pulmonary:      Effort: Pulmonary effort is normal    Musculoskeletal:      Comments: Left hand is normal on inspection  There is tenderness to palpation exquisitely at 1st [facility] and to a lesser extent at the IP joint  Pain is increased by flexion at the wrist, extension and medial and lateral range of motion are intact  There is tenderness along the medial flexor tendons   Neurological:      Mental Status: She is alert and oriented to person, place, and time  Psychiatric:         Behavior: Behavior normal  Behavior is cooperative  Thought Content:  Thought content normal          Judgment: Judgment normal

## 2022-05-09 ENCOUNTER — HOSPITAL ENCOUNTER (OUTPATIENT)
Dept: RADIOLOGY | Facility: HOSPITAL | Age: 77
Discharge: HOME | End: 2022-05-09
Payer: COMMERCIAL

## 2022-05-09 DIAGNOSIS — M25.532 LEFT WRIST PAIN: ICD-10-CM

## 2022-05-09 DIAGNOSIS — S63.8X2A SPRAIN OF CARPOMETACARPAL JOINT OF LEFT HAND, INITIAL ENCOUNTER: ICD-10-CM

## 2022-05-09 PROCEDURE — 73110 X-RAY EXAM OF WRIST: CPT

## 2022-05-09 PROCEDURE — 73130 X-RAY EXAM OF HAND: CPT

## 2022-05-11 DIAGNOSIS — F32.9 REACTIVE DEPRESSION: ICD-10-CM

## 2022-05-11 RX ORDER — ESCITALOPRAM OXALATE 5 MG/1
TABLET ORAL
Qty: 90 TABLET | Refills: 1 | Status: SHIPPED | OUTPATIENT
Start: 2022-05-11

## 2022-05-12 ENCOUNTER — APPOINTMENT (OUTPATIENT)
Dept: LAB | Facility: CLINIC | Age: 77
End: 2022-05-12
Payer: COMMERCIAL

## 2022-05-12 ENCOUNTER — OFFICE VISIT (OUTPATIENT)
Dept: INTERNAL MEDICINE CLINIC | Facility: CLINIC | Age: 77
End: 2022-05-12
Payer: COMMERCIAL

## 2022-05-12 VITALS
WEIGHT: 184 LBS | RESPIRATION RATE: 16 BRPM | BODY MASS INDEX: 31.41 KG/M2 | HEIGHT: 64 IN | SYSTOLIC BLOOD PRESSURE: 124 MMHG | HEART RATE: 78 BPM | DIASTOLIC BLOOD PRESSURE: 66 MMHG

## 2022-05-12 DIAGNOSIS — M25.532 LEFT WRIST PAIN: ICD-10-CM

## 2022-05-12 DIAGNOSIS — L08.9 TOE INFECTION: ICD-10-CM

## 2022-05-12 DIAGNOSIS — L08.9 TOE INFECTION: Primary | ICD-10-CM

## 2022-05-12 PROCEDURE — 99214 OFFICE O/P EST MOD 30 MIN: CPT | Performed by: NURSE PRACTITIONER

## 2022-05-12 PROCEDURE — 3074F SYST BP LT 130 MM HG: CPT | Performed by: NURSE PRACTITIONER

## 2022-05-12 PROCEDURE — 3078F DIAST BP <80 MM HG: CPT | Performed by: NURSE PRACTITIONER

## 2022-05-12 PROCEDURE — 87070 CULTURE OTHR SPECIMN AEROBIC: CPT

## 2022-05-12 PROCEDURE — 1036F TOBACCO NON-USER: CPT | Performed by: NURSE PRACTITIONER

## 2022-05-12 PROCEDURE — 1160F RVW MEDS BY RX/DR IN RCRD: CPT | Performed by: NURSE PRACTITIONER

## 2022-05-12 PROCEDURE — 87205 SMEAR GRAM STAIN: CPT

## 2022-05-12 RX ORDER — CEPHALEXIN 500 MG/1
500 CAPSULE ORAL EVERY 8 HOURS SCHEDULED
Qty: 21 CAPSULE | Refills: 0 | Status: SHIPPED | OUTPATIENT
Start: 2022-05-12 | End: 2022-05-19

## 2022-05-12 RX ORDER — LATANOPROST 50 UG/ML
SOLUTION/ DROPS OPHTHALMIC
COMMUNITY
Start: 2022-04-29

## 2022-05-12 NOTE — PROGRESS NOTES
[place]    NAME: Edilson Hare  AGE: 68 y o  SEX: female  : 1945     DATE: 2022     Assessment and Plan:     1  Toe infection  Elevate leg and follow up next week  - Wound culture and Gram stain; Future  - cephalexin (KEFLEX) 500 mg capsule; Take 1 capsule (500 mg total) by mouth every 8 (eight) hours for 7 days  Dispense: 21 capsule; Refill: 0  2  Wrist pain xrays pending- cut back ibuprofen to bid     BMI Counseling: Body mass index is 31 58 kg/m²  The BMI is above normal  Nutrition recommendations include decreasing portion sizes and consuming healthier snacks  Exercise recommendations include exercising 3-5 times per week  No pharmacotherapy was ordered  Rationale for BMI follow-up plan is due to patient being overweight or obese  No follow-ups on file  History of Present Illness:   Started w/ blisters to right toes on Tuesday  Had this on left foot and tx w/ abx  Feet are swollen no fevers     Review of Systems:     Review of Systems   Constitutional: Negative for appetite change, chills, diaphoresis, fatigue, fever and unexpected weight change  HENT: Negative for postnasal drip and sneezing  Eyes: Negative for visual disturbance  Respiratory: Negative for chest tightness and shortness of breath  Cardiovascular: Negative for chest pain, palpitations and leg swelling  Gastrointestinal: Negative for abdominal pain and blood in stool  Endocrine: Negative for cold intolerance, heat intolerance, polydipsia, polyphagia and polyuria  Genitourinary: Negative for difficulty urinating, dysuria, frequency and urgency  Musculoskeletal: Negative for arthralgias and myalgias  Skin: Positive for wound  Negative for rash  Neurological: Negative for dizziness, weakness, light-headedness and headaches  Hematological: Negative for adenopathy     Psychiatric/Behavioral: Negative for confusion, dysphoric mood and sleep disturbance  The patient is not nervous/anxious  Objective:     /66 (BP Location: Left arm, Patient Position: Sitting, Cuff Size: Standard)   Pulse 78   Resp 16   Ht 5' 4" (1 626 m)   Wt 83 5 kg (184 lb)   BMI 31 58 kg/m²     Physical Exam  Constitutional:       Appearance: She is well-developed  HENT:      Head: Normocephalic and atraumatic  Eyes:      Pupils: Pupils are equal, round, and reactive to light  Neck:      Thyroid: No thyromegaly  Cardiovascular:      Rate and Rhythm: Normal rate and regular rhythm  Heart sounds: No murmur heard  Pulmonary:      Effort: Pulmonary effort is normal       Breath sounds: Normal breath sounds  Abdominal:      General: Bowel sounds are normal       Palpations: Abdomen is soft  Musculoskeletal:         General: Normal range of motion  Cervical back: Normal range of motion and neck supple  Lymphadenopathy:      Cervical: No cervical adenopathy  Skin:     General: Skin is warm and dry  Findings: Erythema present  Neurological:      Mental Status: She is alert and oriented to person, place, and time           IRVING Junior  [facility]

## 2022-05-13 ENCOUNTER — TELEPHONE (OUTPATIENT)
Dept: INTERNAL MEDICINE CLINIC | Facility: CLINIC | Age: 77
End: 2022-05-13

## 2022-05-13 DIAGNOSIS — M18.12 PRIMARY OSTEOARTHRITIS OF FIRST CARPOMETACARPAL JOINT OF LEFT HAND: Primary | ICD-10-CM

## 2022-05-13 NOTE — TELEPHONE ENCOUNTER
----- Message from Rober Oliver [place] sent at 5/13/2022 12:48 PM EDT -----  No evidence of infection so far on culture

## 2022-05-13 NOTE — TELEPHONE ENCOUNTER
----- Message from [facility], [address] sent at 5/13/2022 12:48 PM EDT -----  No evidence of infection so far on culture

## 2022-05-13 NOTE — TELEPHONE ENCOUNTER
----- Message from Marilee Blackburn, [address] sent at 5/13/2022 12:48 PM EDT -----  No evidence of infection so far on culture

## 2022-05-14 LAB
BACTERIA WND AEROBE CULT: NORMAL
GRAM STN SPEC: NORMAL

## 2022-05-26 DIAGNOSIS — E11.9 TYPE 2 DIABETES MELLITUS WITHOUT COMPLICATION, WITHOUT LONG-TERM CURRENT USE OF INSULIN (HCC): ICD-10-CM

## 2022-05-26 RX ORDER — METFORMIN HYDROCHLORIDE 500 MG/1
TABLET, EXTENDED RELEASE ORAL
Qty: 360 TABLET | Refills: 1 | Status: SHIPPED | OUTPATIENT
Start: 2022-05-26

## 2022-06-08 ENCOUNTER — OFFICE VISIT (OUTPATIENT)
Dept: OBGYN CLINIC | Facility: CLINIC | Age: 77
End: 2022-06-08
Payer: COMMERCIAL

## 2022-06-08 VITALS
HEIGHT: 64 IN | BODY MASS INDEX: 29.53 KG/M2 | HEART RATE: 83 BPM | WEIGHT: 173 LBS | DIASTOLIC BLOOD PRESSURE: 72 MMHG | SYSTOLIC BLOOD PRESSURE: 117 MMHG | RESPIRATION RATE: 18 BRPM

## 2022-06-08 DIAGNOSIS — M77.8 TENDONITIS OF WRIST, LEFT: Primary | ICD-10-CM

## 2022-06-08 DIAGNOSIS — M18.12 PRIMARY OSTEOARTHRITIS OF FIRST CARPOMETACARPAL JOINT OF LEFT HAND: ICD-10-CM

## 2022-06-08 PROCEDURE — 1036F TOBACCO NON-USER: CPT | Performed by: ORTHOPAEDIC SURGERY

## 2022-06-08 PROCEDURE — 1160F RVW MEDS BY RX/DR IN RCRD: CPT | Performed by: ORTHOPAEDIC SURGERY

## 2022-06-08 PROCEDURE — 99203 OFFICE O/P NEW LOW 30 MIN: CPT | Performed by: ORTHOPAEDIC SURGERY

## 2022-06-08 PROCEDURE — 3078F DIAST BP <80 MM HG: CPT | Performed by: ORTHOPAEDIC SURGERY

## 2022-06-08 PROCEDURE — 3074F SYST BP LT 130 MM HG: CPT | Performed by: ORTHOPAEDIC SURGERY

## 2022-06-08 PROCEDURE — 20600 DRAIN/INJ JOINT/BURSA W/O US: CPT | Performed by: ORTHOPAEDIC SURGERY

## 2022-06-08 RX ADMIN — TRIAMCINOLONE ACETONIDE 20 MG: 40 INJECTION, SUSPENSION INTRA-ARTICULAR; INTRAMUSCULAR at 15:58

## 2022-06-08 RX ADMIN — LIDOCAINE HYDROCHLORIDE 2 ML: 10 INJECTION, SOLUTION INFILTRATION; PERINEURAL at 15:58

## 2022-06-08 NOTE — PROGRESS NOTES
HPI:  Patient is a 68y o  year old female who presents with chief complaint of left hand and wrist pain  She states it has been going on for little over a month  She states she has pain at the base of her thumb and over her wrist   She states she has difficulty twisting open jars and bottles  She also notes pain that goes to her finger tips  She denies any clicking or locking  She denies any numbness or tingling  She states occasionally she will have some discomfort into her forearm  She has been taking some Tylenol and anti-inflammatories with minimal relief of her symptoms        ROS:   General: No fever, no chills, no weight loss, no weight gain  HEENT:  No loss of hearing, no nose bleeds, no sore throat  Eyes:  No eye pain, no red eyes, no visual disturbance  Respiratory:  No cough, no shortness of breath, no wheezing  Cardiovascular:  No chest pain, no palpitations, no edema  GI: No abdominal pain, no nausea, no vomiting  Endocrine: No frequent urination, no excessive thirst  Urinary:  No dysuria, no hematuria, no incontinence  Musculoskeletal: see HPI and PE  Skin:  No rash, no wounds  Neurological:  No dizziness, no headache, no numbness  Psychiatric:  No difficulty concentrating, no depression, no suicide thoughts, no anxiety  Review of all other systems is negative    PMH:  Past Medical History:   Diagnosis Date    Arthritis     Colon polyp     Diabetes mellitus ([address] )     Disease of thyroid gland     Elbow fracture     Foot fracture, left     GERD (gastroesophageal reflux disease)     occasional    Hypertension     Irritable bowel syndrome        PSH:  Past Surgical History:   Procedure Laterality Date    APPENDECTOMY      CHOLECYSTECTOMY      DILATION AND CURETTAGE OF UTERUS  1995    CT CMBND ANTERPOST COLPORRAPHY W/CYSTO N/A 7/29/2019    Procedure: A&P COLPORRHAPHY; GRAFT;  Surgeon: Flora Fernandez MD;  Location: [facility];  Service: UroGynecology           CT CYSTOURETHROSCOPY N/A 7/29/2019    Procedure: CYSTOSCOPY;  Surgeon: Pauline Vines MD;  [facility];  Service: UroGynecology           CT REVAGINAL PROLAPSE,SACROSP LIG N/A 7/29/2019    Procedure: VE COLPOPEXY;  Surgeon: Pauline Vines MD;  [facility];  Service: UroGynecology           CT SLING OPER STRES INCONTINENCE N/A 7/29/2019    Procedure: PUBOVAGINAL SLING;  Surgeon: Pauline Vines MD;  [facility];  Service: UroGynecology           TONSILLECTOMY      TONSILLECTOMY         Medications:  Current Outpatient Medications   Medication Sig Dispense Refill    acetaminophen (TYLENOL) 325 mg tablet Take 2 tablets (650 mg total) by mouth every 6 (six) hours as needed for mild pain 30 tablet 0    atorvastatin (LIPITOR) 10 mg tablet TAKE 1 TABLET BY MOUTH EVERY DAY 90 tablet 3    escitalopram (LEXAPRO) 5 mg tablet TAKE 1 TABLET BY MOUTH EVERY DAY 90 tablet 1    IBUPROFEN PO Take by mouth every 6 (six) hours as needed for mild pain      Lancets (ONETOUCH DELICA PLUS [id number]) MISC TEST DAILY 100 each 2    latanoprost (XALATAN) 0 005 % ophthalmic solution INSTILL 1 DROP INTO BOTH EYES AT BEDTIME      metFORMIN (GLUCOPHAGE-XR) 500 mg 24 hr tablet TAKE 4 TABLETS BY MOUTH EVERY  tablet 1    Multiple Vitamins-Minerals (CENTRUM SILVER) CHEW Chew 1 tablet daily      neomycin-colistin-hydrocortisone-thonzonium (CORTISPORIN TC) 0 33-0 3-1-0 05 % otic suspension Administer 3 drops into both ears 4 (four) times a day (Patient taking differently: Administer 3 drops into both ears if needed) 10 mL 0    ONE TOUCH ULTRA TEST test strip 1 EACH BY OTHER ROUTE DAILY USE AS INSTRUCTED 100 each 2    OneTouch Delica Lancets 97M MISC TEST 1 DAILY AS INSTRUCTED 100 each 2    psyllium (METAMUCIL) 0 52 g capsule Take 0 52 g by mouth daily      amLODIPine-benazepril (LOTREL 5-10) 5-10 MG per capsule TAKE 1 CAPSULE BY MOUTH EVERY DAY 90 capsule 3    levothyroxine 100 mcg tablet TAKE 1 TABLET BY MOUTH EVERY DAY 90 tablet 3    spironolactone (ALDACTONE) 25 mg tablet TAKE 1 TABLET BY MOUTH EVERY DAY 90 tablet 3    valACYclovir (VALTREX) 1,000 mg tablet Take 1 tablet (1,000 mg total) by mouth as needed (as needed) 14 tablet 0     No current facility-administered medications for this visit  Allergies: Allergies   Allergen Reactions    Penicillins      Was child- reaction unknown       Family History:  Family History   Problem Relation Age of Onset    Colon cancer Mother         Bowel    Asthma Mother     Heart failure Father         Congestive    Ulcers Father     Heart attack Maternal Grandfather         Myocardial infarction    Stomach cancer Paternal Grandmother     Breast cancer Paternal Aunt     Ovarian cancer Neg Hx        Social History:  Social History     Occupational History    Occupation: Retired   Tobacco Use    Smoking status: Never Smoker    Smokeless tobacco: Never Used   Vaping Use    Vaping Use: Never used   Substance and Sexual Activity    Alcohol use: Yes     Comment: rare, maybe once a year    Drug use: No    Sexual activity: Yes     Partners: Male       Physical Exam:  General :  Alert, cooperative, no distress, appears stated age  Blood pressure 117/72, pulse 83, resp  rate 18, height 5' 4" (1 626 m), weight 78 5 kg (173 lb), not currently breastfeeding  Head:  Normocephalic, without obvious abnormality, atraumatic   Eyes:  Conjunctiva/corneas clear, EOM's intact,   Ears: Both ears normal appearance, no hearing deficits      Nose: Nares normal, septum midline, no drainage    Neck: Supple,  trachea midline, no adenopathy, no tenderness, no mass   Back:   Symmetric, no curvature, ROM normal, no tenderness   Lungs:   Respirations unlabored   Chest Wall:  No tenderness or deformity   Extremities: Extremities normal, atraumatic, no cyanosis or edema      Pulses: 2+ and symmetric   Skin: Skin color, texture, turgor normal, no rashes or lesions      Neurologic: Normal           Ortho Exam    left CMC Exam:  No adduction contracture  No hyperextension deformity of MCP joint  Positive localized tenderness over radial and dorsal aspect of thumb (CMC joint)  Grind test is Positive for pain and Positive for crepitus  No triggering or tenderness over the A1 pulley  No pain with Finkelsteins maneuver     Tenderness to palpation over the A1 pulley and over the wrist at the carpal tunnel   Negative Tinel's at the wrist negative compression test at the wrist, negative Phalen's test      Imaging Studies: The following imaging studies were reviewed in office today  My findings are noted  X-rays of the left hand and wrist demonstrated moderate CMC arthritis of the thumb  No acute fractures or dislocations noted    Assessment  Encounter Diagnoses   Name Primary?  Primary osteoarthritis of first carpometacarpal joint of left hand     Tendonitis of wrist, left Yes         Plan:  77-year-old female with left thumb CMC arthritis, flexor tendinitis  · Patient was given cortisone injection today for left thumb CMC arthritis  She tolerated well  · She was advised that we can repeat the cortisone injection every 3-4 months as needed for pain  · She can take Tylenol and anti-inflammatories as needed for pain  · She was given a physical therapy script to work on range of motion, stretching, strengthening modalities as needed for pain   · She will follow-up in 6 weeks for re-evaluation    Small joint arthrocentesis: L thumb CMC  Severy Protocol:  Consent: Verbal consent obtained  Risks and benefits: risks, benefits and alternatives were discussed  Consent given by: patient  Time out: Immediately prior to procedure a "time out" was called to verify the correct patient, procedure, equipment, support staff and site/side marked as required    Patient understanding: patient states understanding of the procedure being performed  Patient consent: the patient's understanding of the procedure matches consent given  Site marked: the operative site was marked  Patient identity confirmed: verbally with patient    Supporting Documentation  Indications: pain   Procedure Details  Location: thumb - L thumb CMC  Preparation: Patient was prepped and draped in the usual sterile fashion  Needle size: 25 G  Approach: dorsal  Medications administered: 20 mg triamcinolone acetonide 40 mg/mL; 2 mL lidocaine 1 %    Patient tolerance: patient tolerated the procedure well with no immediate complications  Dressing:  Sterile dressing applied        Scribe Attestation    I,:  Kirstie Morales PA-C am acting as a scribe while in the presence of the attending physician :       I,:  Maldonado Marti MD personally performed the services described in this documentation    as scribed in my presence :

## 2022-06-10 DIAGNOSIS — E03.9 ACQUIRED HYPOTHYROIDISM: ICD-10-CM

## 2022-06-10 DIAGNOSIS — I10 ESSENTIAL HYPERTENSION: ICD-10-CM

## 2022-06-10 RX ORDER — AMLODIPINE BESYLATE AND BENAZEPRIL HYDROCHLORIDE 5; 10 MG/1; MG/1
CAPSULE ORAL
Qty: 90 CAPSULE | Refills: 3 | Status: SHIPPED | OUTPATIENT
Start: 2022-06-10

## 2022-06-10 RX ORDER — LEVOTHYROXINE SODIUM 0.1 MG/1
TABLET ORAL
Qty: 90 TABLET | Refills: 3 | Status: SHIPPED | OUTPATIENT
Start: 2022-06-10

## 2022-06-10 RX ORDER — SPIRONOLACTONE 25 MG/1
TABLET ORAL
Qty: 90 TABLET | Refills: 3 | Status: SHIPPED | OUTPATIENT
Start: 2022-06-10

## 2022-06-20 ENCOUNTER — EVALUATION (OUTPATIENT)
Dept: OCCUPATIONAL THERAPY | Facility: CLINIC | Age: 77
End: 2022-06-20
Payer: COMMERCIAL

## 2022-06-20 DIAGNOSIS — M77.8 TENDONITIS OF WRIST, LEFT: ICD-10-CM

## 2022-06-20 DIAGNOSIS — M18.12 PRIMARY OSTEOARTHRITIS OF FIRST CARPOMETACARPAL JOINT OF LEFT HAND: ICD-10-CM

## 2022-06-20 PROCEDURE — 97110 THERAPEUTIC EXERCISES: CPT | Performed by: OCCUPATIONAL THERAPIST

## 2022-06-20 PROCEDURE — 97165 OT EVAL LOW COMPLEX 30 MIN: CPT | Performed by: OCCUPATIONAL THERAPIST

## 2022-06-20 NOTE — PROGRESS NOTES
OT Evaluation     Today's date: 2022  Patient name: Zhen Pettit  : 1945  MRN: [id number]  Referring provider: Gus Hernandez PA-C  Dx:   Encounter Diagnosis     ICD-10-CM    1  Primary osteoarthritis of first carpometacarpal joint of left hand  M18 12 Ambulatory Referral to PT/OT Hand Therapy   2  Tendonitis of wrist, left  M77 8 Ambulatory Referral to PT/OT Hand Therapy                  Assessment  Assessment details: Marilu is a 67 y/o RHD female who began having left wrist about 2 months ago from prolonged holding of her phone to read  She reports mild tenderness increasing into severe pain overnight  When pain did not resolve, she went to an orthopedic for care  (-) Xray  She reports receiving  a cortisone injection in the first dorsal compartment area  Date of injection  2022  She reports reduced pain of the thumb and radial side of wrist after the injection  Pain in the volar and dorsal central wrist zone continues and patient holds her hand in a guarded manner due to pain  Examination reveals limited ROM of the wrist, functional ROM of the digits  Pain moderate at rest, increasing with activity  Minimal edema observed and no sensory complaints reported  Poor  and pinch strength due to pain  Evaluation is of low complexity, due to minimal comorbidities and stable clinical presentation  Pt demonstrates good tolerance of therapy today and was provided with a written HEP focusing on gentle active wrist and finger exercises and was instructed to perform exercises daily  The patient demonstrates HEP instructions appropriately, verbalizes understanding, and is in agreement with the written HEP  Pt will benefit from skilled OT intervention to progress motion, resolve pain, achieve functional strength,  and allow return to PLOF        Impairments: abnormal or restricted ROM, activity intolerance, impaired physical strength, lacks appropriate home exercise program, pain with function and weight-bearing intolerance    Symptom irritability: moderateUnderstanding of Dx/Px/POC: excellent   Prognosis: good    Goals  STG 2 weeks   Increase wrist arc by at least 10 degrees in extension and flexion   Increase forearm rotation by at least 10 degrees in supination and pronation  Reduce edema to a minimal level   Reduce pain at rest to less than 2/10    LTG  By discharge   Achieve functional wrist arc, >80 degrees   Achieve functional FA arc > 160   Achieve composite flexion of all digits   Achieve  strength to at least 50% of the uninvolved   Achieve FOTO goals established at IE  Plan  Patient would benefit from: OT eval and skilled occupational therapy  Planned modality interventions: thermotherapy: hydrocollator packs, ultrasound and unattended electrical stimulation  Planned therapy interventions: joint mobilization, manual therapy, massage, neuromuscular re-education, orthotic management and training, patient education, strengthening, stretching, therapeutic activities, therapeutic exercise, home exercise program, graded exercise, graded activity, functional ROM exercises and fine motor coordination training  Frequency: 2x week  Duration in weeks: 6  Plan of Care beginning date: 2022  Plan of Care expiration date: 2022  Treatment plan discussed with: patient and family        Subjective Evaluation    History of Present Illness  Mechanism of injury: Non trauma left wrist pain from prolonged holding of phone              Recurrent probem    Quality of life: good    Pain  Current pain ratin  Quality: radiating, sharp and burning  Aggravating factors: lifting    Social Support    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, 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)  Hand dominance: right      Diagnostic Tests  X-ray: normal  Treatments  No previous or current treatments  Current treatment: occupational therapy  Patient Goals  Patient goals for therapy: decreased pain, increased motion, increased strength, independence with ADLs/IADLs and return to sport/leisure activities  Patient goal: Be able to do all self care, wash dishes, do usual household        Objective     Observations     Left Wrist/Hand   Negative for edema  Neurological Testing     Sensation     Wrist/Hand   Left   Intact: light touch    Active Range of Motion     Left Wrist   Wrist flexion: 25 degrees   Wrist extension: 55 degrees     Right Wrist   Normal active range of motion  Wrist flexion: 60 degrees   Wrist extension: 62 degrees     Strength/Myotome Testing     Additional Strength Details  Jackson #2  L  10 9 lbs,  R  48 2 lbs    Lat pinch  L  1 7 lbs,  R  10 4 lbs  Swelling     Left Wrist/Hand   Circumference wrist: 15 5 cm    Right Wrist/Hand   Circumference wrist: 15 4 cm       Access Code: [email or web address]  URL: [email or web address]  Date: 06/20/2022  Prepared by: Sree Tuttle    Exercises  · Seated Wrist Extension AROM - 3 x daily - 7 x weekly - 1 sets - 10 reps  · Wrist AROM Radial Ulnar Deviation - 3 x daily - 7 x weekly - 1 sets - 10 reps  · Wrist Tendon Gliding - 3 x daily - 7 x weekly - 1 sets - 10 reps  · Thumb AROM Opposition To All Fingers - 3 x daily - 7 x weekly - 1 sets - 10 reps             Precautions:  diabetic    Date 6/20            Visit 1            Manuals                                                                 Neuro Re-Ed                                                                                                        Ther Ex 15'            HEP Wrist AROM, digit TGE, splint wear                                                                                                          Ther Activity                                       Gait Training                                       Modalities

## 2022-06-23 ENCOUNTER — OFFICE VISIT (OUTPATIENT)
Dept: OCCUPATIONAL THERAPY | Facility: CLINIC | Age: 77
End: 2022-06-23
Payer: COMMERCIAL

## 2022-06-23 DIAGNOSIS — M18.12 PRIMARY OSTEOARTHRITIS OF FIRST CARPOMETACARPAL JOINT OF LEFT HAND: Primary | ICD-10-CM

## 2022-06-23 DIAGNOSIS — M77.8 TENDONITIS OF WRIST, LEFT: ICD-10-CM

## 2022-06-23 PROCEDURE — 97140 MANUAL THERAPY 1/> REGIONS: CPT | Performed by: OCCUPATIONAL THERAPIST

## 2022-06-23 PROCEDURE — 97110 THERAPEUTIC EXERCISES: CPT | Performed by: OCCUPATIONAL THERAPIST

## 2022-06-23 NOTE — PROGRESS NOTES
Daily Note     Today's date: 2022  Patient name: Garcia Perez  : 1945  MRN: [id number]  Referring provider: Carlene Montes De Oca PA-C  Dx:   Encounter Diagnosis     ICD-10-CM    1  Primary osteoarthritis of first carpometacarpal joint of left hand  M18 12    2  Tendonitis of wrist, left  M77 8                   Subjective:  " I wear the splint and I have much less pain"  3/10      Objective: See treatment diary below for clinic program         Assessment: Tolerated treatment well  Patient would benefit from continued OT      Plan: Progress treatment as tolerated  Patient will remove splint for seated tasks, self care, eating, and bathing  Precautions:  diabetic    Date            Visit 1 2           Manuals  15'           IASTM  Volar FA, ECU  10'           Jt mob  Wrist G1, thumb CMC  5'                                     Neuro Re-Ed                                                                                                        Ther Ex 15'   25'           HEP Wrist AROM, digit TGE, splint wear    Splint off seated  Light;  PROM digits 6'           PROM 1:1  Gentle FA, wrist, digits 8'           AROM FA, wrist  Wire maze  x4           AROM digits  Gems  P/u, lay down  5x6           AROM wrist  Y F bar  E 1x10  RD   1x10                                                  Ther Activity                                       Gait Training                                       Modalities

## 2022-06-26 RX ORDER — LIDOCAINE HYDROCHLORIDE 10 MG/ML
2 INJECTION, SOLUTION INFILTRATION; PERINEURAL
Status: COMPLETED | OUTPATIENT
Start: 2022-06-08 | End: 2022-06-08

## 2022-06-26 RX ORDER — TRIAMCINOLONE ACETONIDE 40 MG/ML
20 INJECTION, SUSPENSION INTRA-ARTICULAR; INTRAMUSCULAR
Status: COMPLETED | OUTPATIENT
Start: 2022-06-08 | End: 2022-06-08

## 2022-06-28 ENCOUNTER — OFFICE VISIT (OUTPATIENT)
Dept: OCCUPATIONAL THERAPY | Facility: CLINIC | Age: 77
End: 2022-06-28
Payer: COMMERCIAL

## 2022-06-28 DIAGNOSIS — M77.8 TENDONITIS OF WRIST, LEFT: ICD-10-CM

## 2022-06-28 DIAGNOSIS — M18.12 PRIMARY OSTEOARTHRITIS OF FIRST CARPOMETACARPAL JOINT OF LEFT HAND: Primary | ICD-10-CM

## 2022-06-28 PROCEDURE — 97110 THERAPEUTIC EXERCISES: CPT | Performed by: OCCUPATIONAL THERAPIST

## 2022-06-28 PROCEDURE — 97140 MANUAL THERAPY 1/> REGIONS: CPT | Performed by: OCCUPATIONAL THERAPIST

## 2022-06-28 NOTE — PROGRESS NOTES
Daily Note     Today's date: 2022  Patient name: Deonte Mann  : 1945  MRN: [id number]  Referring provider: Israel Lima PA-C  Dx:   Encounter Diagnosis     ICD-10-CM    1  Primary osteoarthritis of first carpometacarpal joint of left hand  M18 12    2  Tendonitis of wrist, left  M77 8                   Subjective:  " I wear the splint and I have much less pain"  3/10  " Dishes are tough"  scrubbing      Objective: See treatment diary below for clinic program         Assessment: Tolerated treatment well  Patient would benefit from continued OT ;  Pain at rest greatly reduced  Plan: Progress treatment as tolerated  Patient will remove splint for seated tasks, self care, eating, and bathing  Precautions:  diabetic    Date           Visit 1 2 3'          Manuals  15' 10'          IASTM  Volar FA, ECU  10' Wrist, CMC 5'          Jt mob  Wrist G1, thumb CMC  5' Wrist   G1, thumb CMC  5'                                    Neuro Re-Ed                                                                                                        Ther Ex 15'   25'   25'          HEP Wrist AROM, digit TGE, splint wear    Splint off seated  Light;  PROM digits 6'           PROM 1:1  Gentle FA, wrist, digits 8' FA, wrist, digits  8'          AROM FA, wrist  Wire maze  x4 Wire maze  x4          AROM digits  Gems  P/u, lay down  [place] p/u, lay down  5x5          AROM wrist  Y F bar  E 1x10  RD   1x10 Y F bar  E 1x10          PRE FA/wrist   Y F bar  [place]  x20                                    Ther Activity     10'          Gripping   #1  5x5          Pinching   R C pin  Full web          Gait Training                                       Modalities

## 2022-06-30 ENCOUNTER — OFFICE VISIT (OUTPATIENT)
Dept: OCCUPATIONAL THERAPY | Facility: CLINIC | Age: 77
End: 2022-06-30
Payer: COMMERCIAL

## 2022-06-30 DIAGNOSIS — M77.8 TENDONITIS OF WRIST, LEFT: ICD-10-CM

## 2022-06-30 DIAGNOSIS — M18.12 PRIMARY OSTEOARTHRITIS OF FIRST CARPOMETACARPAL JOINT OF LEFT HAND: Primary | ICD-10-CM

## 2022-06-30 PROCEDURE — 97110 THERAPEUTIC EXERCISES: CPT | Performed by: OCCUPATIONAL THERAPIST

## 2022-06-30 PROCEDURE — 97140 MANUAL THERAPY 1/> REGIONS: CPT | Performed by: OCCUPATIONAL THERAPIST

## 2022-06-30 NOTE — PROGRESS NOTES
Daily Note     Today's date: 2022  Patient name: Ariella Toure  : 1945  MRN: [id number]  Referring provider: Sanchez Black PA-C  Dx:   Encounter Diagnosis     ICD-10-CM    1  Primary osteoarthritis of first carpometacarpal joint of left hand  M18 12    2  Tendonitis of wrist, left  M77 8                   Subjective:  " I wear the splint and I have much less pain"  3/10  " Dishes are tough"  scrubbing      Objective: See treatment diary below for clinic program         Assessment: Tolerated treatment well  Patient would benefit from continued OT ;  Pain at rest greatly reduced  Plan: Progress treatment as tolerated  Patient will remove splint for seated tasks, self care, eating, and bathing  Precautions:  diabetic    Date          Visit 1 2 3' 4'         Manuals  15' 10' 10'         IASTM  Volar FA, ECU  10' Wrist, [facility] 5' Wrist CMC  5'         Jt mob  Wrist G1, thumb CMC  5' Wrist   G1, thumb CMC  5' Wrist G1  Thumb CMC  5'                                   Neuro Re-Ed                                                                                                        Ther Ex 15'   25'   25'   25'         HEP Wrist AROM, digit TGE, splint wear    Splint off seated  Light;  PROM digits 6'           PROM 1:1  Gentle FA, wrist, digits 8' FA, wrist, digits  8' FA, wrist, digits  8'         AROM FA  Wire maze  x4 Wire maze  x4 Y F bar  S/P  2x10         AROM digits  Gems  P/u, lay down  [employer] p/u, lay down  5x5          PRE wrist  Y F bar  E 1x10  RD   1x10 Y F bar  E 1x10 Y F bar  Wrist E  2x10         PRE FA/wrist   Y F bar  [place]  x20 Y F bar  [place]  x20                                   Ther Activity     10'   10'         Gripping   #1  5x5 #1  5x5         Pinching   R C pin  Full web R C pins  Full web         Gait Training                                       Modalities

## 2022-07-05 ENCOUNTER — OFFICE VISIT (OUTPATIENT)
Dept: OCCUPATIONAL THERAPY | Facility: CLINIC | Age: 77
End: 2022-07-05
Payer: COMMERCIAL

## 2022-07-05 DIAGNOSIS — M18.12 PRIMARY OSTEOARTHRITIS OF FIRST CARPOMETACARPAL JOINT OF LEFT HAND: Primary | ICD-10-CM

## 2022-07-05 DIAGNOSIS — M77.8 TENDONITIS OF WRIST, LEFT: ICD-10-CM

## 2022-07-05 PROCEDURE — 97530 THERAPEUTIC ACTIVITIES: CPT | Performed by: OCCUPATIONAL THERAPIST

## 2022-07-05 PROCEDURE — 97110 THERAPEUTIC EXERCISES: CPT | Performed by: OCCUPATIONAL THERAPIST

## 2022-07-05 NOTE — PROGRESS NOTES
Daily Note     Today's date: 2022  Patient name: Abbie Cosby  : 1945  MRN: [id number]  Referring provider: Rayshawn Barbosa PA-C  Dx:   Encounter Diagnosis     ICD-10-CM    1  Primary osteoarthritis of first carpometacarpal joint of left hand  M18 12    2  Tendonitis of wrist, left  M77 8                   Subjective:  " I wear the splint and I have much less pain"  3/10  " Dishes are tough"  Scrubbing   " I am wearing the splint less, not at night now"      Objective: See treatment diary below for clinic program   Progressing PRE's as tolerated  Assessment: Tolerated treatment well  Patient would benefit from continued OT ;  Pain at rest greatly reduced  Plan: Progress treatment as tolerated  Wean splint as pain allows  NV, record  strengths  Precautions:  diabetic    Date         Visit 1 2 3 4 5        Manuals  15' 10' 10'         IASTM  Volar FA, ECU  10' Wrist, CMC 5' Wrist CMC  5'         Jt mob  Wrist G1, thumb CMC  5' Wrist   G1, thumb CMC  5' Wrist G1  Thumb CMC  5'                                   Neuro Re-Ed                                                                                                        Ther Ex 15'   25'   25'   25'   25'        HEP Wrist AROM, digit TGE, splint wear    Splint off seated  Light;  PROM digits 6'           PROM 1:1  Gentle FA, wrist, digits 8' FA, wrist, digits  8' FA, wrist, digits  8' FA, wrist, digits  6'        PROM wrist     Prayer  10x10        AROM FA  Wire maze  x4 Wire maze  x4 Y F bar  S/P  2x10 Y F bar  S/P   2x10        AROM digits  Gems  P/u, lay down  5x6 Gems p/u, lay down  5x5          PRE wrist  Y F bar  E 1x10  RD   1x10 Y F bar  E 1x10 Y F bar  Wrist E  2x10 Y F bar  Wrist E  2x10        PRE FA/wrist   Y F bar  [place]  x20 Y F bar  [place]  x20 Y F bar  [place]  1x10 3"  Reverse                                  Ther Activity     10'   10'   13'        Gripping   #1  5x5 #1  5x5 #1  5x5 Pinching   R C pin  Full web R C pins  Full web R C pins  Full web         isometric      Dowel into putty x40        Gait Training                                       Modalities             MHP pre   5' 5' 5'

## 2022-07-07 ENCOUNTER — OFFICE VISIT (OUTPATIENT)
Dept: OCCUPATIONAL THERAPY | Facility: CLINIC | Age: 77
End: 2022-07-07
Payer: COMMERCIAL

## 2022-07-07 DIAGNOSIS — M18.12 PRIMARY OSTEOARTHRITIS OF FIRST CARPOMETACARPAL JOINT OF LEFT HAND: Primary | ICD-10-CM

## 2022-07-07 DIAGNOSIS — M77.8 TENDONITIS OF WRIST, LEFT: ICD-10-CM

## 2022-07-07 PROCEDURE — 97530 THERAPEUTIC ACTIVITIES: CPT | Performed by: OCCUPATIONAL THERAPIST

## 2022-07-07 PROCEDURE — 97110 THERAPEUTIC EXERCISES: CPT | Performed by: OCCUPATIONAL THERAPIST

## 2022-07-07 NOTE — PROGRESS NOTES
Daily Note     Today's date: 2022  Patient name: Lilliam Zaman  : 1945  MRN: [id number]  Referring provider: Sheree Curling, PA-C  Dx:   Encounter Diagnosis     ICD-10-CM    1  Primary osteoarthritis of first carpometacarpal joint of left hand  M18 12    2  Tendonitis of wrist, left  M77 8                   Subjective:  " I wear the splint and I have much less pain"  3/10  " Dishes are tough"  Scrubbing   " I am wearing the splint less, not at night now"      Objective: See treatment diary below for clinic program   Progressing PRE's as tolerated  R  57 lbs,  L  23 1 lbs,       Assessment: Tolerated treatment well  Patient would benefit from continued OT ;  Pain at rest greatly reduced  Plan: Progress treatment as tolerated  Wean splint as pain allows  Progress PRE as wrist pain allows  Precautions:  diabetic    Date        Visit 1 2 3 4 5 6       Manuals  15' 10' 10'         IASTM  Volar FA, ECU  10' Wrist, CMC 5' Wrist CMC  5'         Jt mob  Wrist G1, thumb CMC  5' Wrist   G1, thumb CMC  5' Wrist G1  Thumb CMC  5'                                   Neuro Re-Ed      Juan Elizabeth, vibrate  8'                                                                                                  Ther Ex 15'   25'   25'   25'   25'   25'       HEP Wrist AROM, digit TGE, splint wear    Splint off seated  Light;  PROM digits 6'           PROM 1:1  Gentle FA, wrist, digits 8' FA, wrist, digits  8' FA, wrist, digits  8' FA, wrist, digits  6' FA, wrist digits  6'       UE warm up      UBE NV       PROM wrist     Prayer  10x10 Prayer  10x10       PRE FA  Wire maze  x4 Wire maze  x4 Y F bar  S/P  2x10 Y F bar  S/P   2x10 #1  2x10   S/P       AROM digits  Gems  P/u, lay down  5x6 Gems p/u, lay down  5x5          PRE wrist  Y F bar  E 1x10  RD   1x10 Y F bar  E 1x10 Y F bar  Wrist E  2x10 Y F bar  Wrist E  2x10 #1  2x10  wedge       PRE FA/wrist   Y F bar  [place]  x20 Y F bar  [place]  x20 Y F bar  [place]  1x10 3"  Reverse R F bar   [place]  Reverse 1x10                                 Ther Activity     10'   10'   13'   10'       Gripping   #1  5x5 #1  5x5 #1  5x5 #1  5x5       Pinching   R C pin  Full web R C pins  Full web R C pins  Full web         isometric      Dowel into putty x40 [place] into putty  x50       Gait Training                                       Modalities             MHP pre   5' 5' 5' 5'

## 2022-07-12 ENCOUNTER — APPOINTMENT (OUTPATIENT)
Dept: OCCUPATIONAL THERAPY | Facility: CLINIC | Age: 77
End: 2022-07-12
Payer: COMMERCIAL

## 2022-07-14 ENCOUNTER — APPOINTMENT (OUTPATIENT)
Dept: OCCUPATIONAL THERAPY | Facility: CLINIC | Age: 77
End: 2022-07-14
Payer: COMMERCIAL

## 2022-07-19 ENCOUNTER — APPOINTMENT (OUTPATIENT)
Dept: OCCUPATIONAL THERAPY | Facility: CLINIC | Age: 77
End: 2022-07-19
Payer: COMMERCIAL

## 2022-07-20 ENCOUNTER — OFFICE VISIT (OUTPATIENT)
Dept: OBGYN CLINIC | Facility: CLINIC | Age: 77
End: 2022-07-20
Payer: COMMERCIAL

## 2022-07-20 VITALS
HEART RATE: 75 BPM | HEIGHT: 64 IN | DIASTOLIC BLOOD PRESSURE: 68 MMHG | RESPIRATION RATE: 18 BRPM | SYSTOLIC BLOOD PRESSURE: 109 MMHG | BODY MASS INDEX: 29.88 KG/M2 | WEIGHT: 175 LBS

## 2022-07-20 DIAGNOSIS — M19.032 LOCALIZED PRIMARY OSTEOARTHRITIS OF CARPOMETACARPAL (CMC) JOINT OF LEFT WRIST: Primary | ICD-10-CM

## 2022-07-20 PROCEDURE — 3078F DIAST BP <80 MM HG: CPT | Performed by: ORTHOPAEDIC SURGERY

## 2022-07-20 PROCEDURE — 1160F RVW MEDS BY RX/DR IN RCRD: CPT | Performed by: ORTHOPAEDIC SURGERY

## 2022-07-20 PROCEDURE — 3074F SYST BP LT 130 MM HG: CPT | Performed by: ORTHOPAEDIC SURGERY

## 2022-07-20 PROCEDURE — 99213 OFFICE O/P EST LOW 20 MIN: CPT | Performed by: ORTHOPAEDIC SURGERY

## 2022-07-20 NOTE — PROGRESS NOTES
HPI:  Patient is a 68 y o  female presenting to the office for a follow-up evaluation of her right hand and wrist   Patient states that her hand is doing well overall  Patient 6 weeks status post cortisone injection to the 1st right CMC joint  Patient states she continues to have mild pain over the MCP joints throughout her hand and over the volar aspect of the wrist   Patient states she has been attending occupational therapy as directed with good results  Patient states she does take over-the-counter Aleve as needed for pain with mild relief of symptoms  Patient denies any numbness tingling in her hands  Patient offers no other complaints at this time        ROS:   General: No fever, no chills, no weight loss, no weight gain  HEENT:  No loss of hearing, no nose bleeds, no sore throat  Eyes:  No eye pain, no red eyes, no visual disturbance  Respiratory:  No cough, no shortness of breath, no wheezing  Cardiovascular:  No chest pain, no palpitations, no edema  GI: No abdominal pain, no nausea, no vomiting  Endocrine: No frequent urination, no excessive thirst  Urinary:  No dysuria, no hematuria, no incontinence  Musculoskeletal: see HPI and PE  Skin:  No rash, no wounds  Neurological:  No dizziness, no headache, no numbness  Psychiatric:  No difficulty concentrating, no depression, no suicide thoughts, no anxiety  Review of all other systems is negative    PMH:  Past Medical History:   Diagnosis Date    Arthritis     Colon polyp     Diabetes mellitus ([address] )     Disease of thyroid gland     Elbow fracture     Foot fracture, left     GERD (gastroesophageal reflux disease)     occasional    Hypertension     Irritable bowel syndrome        PSH:  Past Surgical History:   Procedure Laterality Date    APPENDECTOMY      CHOLECYSTECTOMY      DILATION AND CURETTAGE OF UTERUS  1995    [place] CMBND ANTERPOST COLPORRAPHY W/CYSTO N/A 7/29/2019    Procedure: A&P COLPORRHAPHY; GRAFT;  Surgeon: Romaine Traore MD; Location: [facility];  Service: UroGynecology           MA CYSTOURETHROSCOPY N/A 7/29/2019    Procedure: Ramon Gey;  Surgeon: Radha Castellano MD;  [facility];  Service: UroGynecology           MA REVAGINAL PROLAPSE,SACROSP LIG N/A 7/29/2019    Procedure: VE COLPOPEXY;  Surgeon: Radha Castellano MD;  [facility];  Service: UroGynecology           MA SLING OPER STRES INCONTINENCE N/A 7/29/2019    Procedure: PUBOVAGINAL SLING;  Surgeon: Radha Castellano MD;  [facility];  Service: UroGynecology           TONSILLECTOMY      TONSILLECTOMY         Medications:  Current Outpatient Medications   Medication Sig Dispense Refill    acetaminophen (TYLENOL) 325 mg tablet Take 2 tablets (650 mg total) by mouth every 6 (six) hours as needed for mild pain 30 tablet 0    amLODIPine-benazepril (LOTREL 5-10) 5-10 MG per capsule TAKE 1 CAPSULE BY MOUTH EVERY DAY 90 capsule 3    atorvastatin (LIPITOR) 10 mg tablet TAKE 1 TABLET BY MOUTH EVERY DAY 90 tablet 3    escitalopram (LEXAPRO) 5 mg tablet TAKE 1 TABLET BY MOUTH EVERY DAY 90 tablet 1    IBUPROFEN PO Take by mouth every 6 (six) hours as needed for mild pain      Lancets (ONETOUCH DELICA PLUS [id number]) MISC TEST DAILY 100 each 2    latanoprost (XALATAN) 0 005 % ophthalmic solution INSTILL 1 DROP INTO BOTH EYES AT BEDTIME      levothyroxine 100 mcg tablet TAKE 1 TABLET BY MOUTH EVERY DAY 90 tablet 3    metFORMIN (GLUCOPHAGE-XR) 500 mg 24 hr tablet TAKE 4 TABLETS BY MOUTH EVERY  tablet 1    Multiple Vitamins-Minerals (CENTRUM SILVER) CHEW Chew 1 tablet daily      neomycin-colistin-hydrocortisone-thonzonium (CORTISPORIN TC) 0 33-0 3-1-0 05 % otic suspension Administer 3 drops into both ears 4 (four) times a day (Patient taking differently: Administer 3 drops into both ears if needed) 10 mL 0    ONE TOUCH ULTRA TEST test strip 1 EACH BY OTHER ROUTE DAILY USE AS INSTRUCTED 100 each 2    OneTouch Delica Lancets 44E MISC TEST 1 DAILY AS INSTRUCTED 100 each 2    psyllium (METAMUCIL) 0 52 g capsule Take 0 52 g by mouth daily      spironolactone (ALDACTONE) 25 mg tablet TAKE 1 TABLET BY MOUTH EVERY DAY 90 tablet 3    valACYclovir (VALTREX) 1,000 mg tablet Take 1 tablet (1,000 mg total) by mouth as needed (as needed) 14 tablet 0     No current facility-administered medications for this visit  Allergies: Allergies   Allergen Reactions    Penicillins      Was child- reaction unknown       Family History:  Family History   Problem Relation Age of Onset    Colon cancer Mother         Bowel    Asthma Mother     Heart failure Father         Congestive    Ulcers Father     Heart attack Maternal Grandfather         Myocardial infarction    Stomach cancer Paternal Grandmother     Breast cancer Paternal Aunt     Ovarian cancer Neg Hx        Social History:  Social History     Occupational History    Occupation: Retired   Tobacco Use    Smoking status: Never Smoker    Smokeless tobacco: Never Used   Vaping Use    Vaping Use: Never used   Substance and Sexual Activity    Alcohol use: Yes     Comment: rare, maybe once a year    Drug use: No    Sexual activity: Yes     Partners: Male       Physical Exam:  General :  Alert, cooperative, no distress, appears stated age  Blood pressure 109/68, pulse 75, resp  rate 18, height 5' 4" (1 626 m), weight 79 4 kg (175 lb), not currently breastfeeding  Head:  Normocephalic, without obvious abnormality, atraumatic   Eyes:  Conjunctiva/corneas clear, EOM's intact,   Ears: Both ears normal appearance, no hearing deficits      Nose: Nares normal, septum midline, no drainage    Neck: Supple,  trachea midline, no adenopathy, no tenderness, no mass   Back:   Symmetric, no curvature, ROM normal, no tenderness   Lungs:   Respirations unlabored   Chest Wall:  No tenderness or deformity   Extremities: Extremities normal, atraumatic, no cyanosis or edema      Pulses: 2+ and symmetric   Skin: Skin color, texture, turgor normal, no rashes or lesions      Neurologic: Normal           Ortho Exam  Left wrist examination:  - Patient sitting comfortably in the office in no acute distress   - no acute visible abnormalities present in left wrist   Extremity appears well-perfused overall   - mild tenderness palpation noted over the 1st CMC joint, MCP joints of the 2nd 3rd and 4th fingers  - able to perform a composite fist without complications  - limited range of motion of the wrist in regards to flexion and extension limited to pain  - Negative tinels examination   - NV intact      Imaging Studies: The following imaging studies were reviewed in office today  My findings are noted  N/A       Assessment  Encounter Diagnosis   Name Primary?     Localized primary osteoarthritis of carpometacarpal (CMC) joint of left wrist Yes         Plan:  - Weight bearing as tolerated left upper extremity   - Continue with therapy as directed   - Over the counter analgesics as needed / directed   - Ice / heat as directed  Brace as needed   - Follow up 6 weeks

## 2022-07-21 ENCOUNTER — APPOINTMENT (OUTPATIENT)
Dept: OCCUPATIONAL THERAPY | Facility: CLINIC | Age: 77
End: 2022-07-21
Payer: COMMERCIAL

## 2022-08-02 ENCOUNTER — TELEPHONE (OUTPATIENT)
Dept: OBGYN CLINIC | Facility: HOSPITAL | Age: 77
End: 2022-08-02

## 2022-08-02 DIAGNOSIS — M19.032 LOCALIZED PRIMARY OSTEOARTHRITIS OF CARPOMETACARPAL (CMC) JOINT OF LEFT WRIST: Primary | ICD-10-CM

## 2022-08-02 DIAGNOSIS — M18.12 PRIMARY OSTEOARTHRITIS OF FIRST CARPOMETACARPAL JOINT OF LEFT HAND: ICD-10-CM

## 2022-08-02 DIAGNOSIS — M77.8 TENDONITIS OF WRIST, LEFT: ICD-10-CM

## 2022-08-10 ENCOUNTER — OFFICE VISIT (OUTPATIENT)
Dept: OCCUPATIONAL THERAPY | Facility: CLINIC | Age: 77
End: 2022-08-10
Payer: COMMERCIAL

## 2022-08-10 DIAGNOSIS — M25.532 LEFT WRIST PAIN: Primary | ICD-10-CM

## 2022-08-10 PROCEDURE — 97110 THERAPEUTIC EXERCISES: CPT | Performed by: OCCUPATIONAL THERAPIST

## 2022-08-10 PROCEDURE — 97140 MANUAL THERAPY 1/> REGIONS: CPT | Performed by: OCCUPATIONAL THERAPIST

## 2022-08-10 NOTE — PROGRESS NOTES
OT Re-Evaluation     Today's date: 8/10/2022  Patient name: Shmuel Norton  : 1945  MRN: [id number]  Referring provider: Alexis Garrido PA-C  Dx:   Encounter Diagnosis     ICD-10-CM    1  Left wrist pain  M25 532                   Assessment  Assessment details: [facility] is a 69 y/o RHD female who began having left wrist about 2 months ago from prolonged holding of her phone to read  She reports mild tenderness increasing into severe pain overnight  When pain did not resolve, she went to an orthopedic for care  (-) Xray  She reports receiving  a cortisone injection in the first dorsal compartment area  Date of injection  2022  She reports reduced pain of the thumb and radial side of wrist after the injection  Pain in the volar and dorsal central wrist zone continues and patient holds her hand in a guarded manner due to pain  Examination reveals limited ROM of the wrist, functional ROM of the digits  Pain moderate at rest, increasing with activity  Minimal edema observed and no sensory complaints reported  Poor  and pinch strength due to pain  Evaluation is of low complexity, due to minimal comorbidities and stable clinical presentation  Pt demonstrates good tolerance of therapy today and was provided with a written HEP focusing on gentle active wrist and finger exercises and was instructed to perform exercises daily  The patient demonstrates HEP instructions appropriately, verbalizes understanding, and is in agreement with the written HEP  Pt will benefit from skilled OT intervention to progress motion, resolve pain, achieve functional strength,  and allow return to PLOF  RE 8/10/2022  [facility] has attended 7 visits and has made excellent progress in motion and strength  Pain continues in volar wrist, mild at rest, increases with resisted tasks  Splint wear has been weaned without increased pain  Therapy was lapsed for one month due to being out of town    Therapy will resume with focus on gaining end range flexion and strength to resume all ADL's without restriction  Impairments: abnormal or restricted ROM, activity intolerance, impaired physical strength, lacks appropriate home exercise program, pain with function and weight-bearing intolerance    Symptom irritability: moderateUnderstanding of Dx/Px/POC: excellent   Prognosis: good    Goals  STG 2 weeks   Increase wrist arc by at least 10 degrees in extension and flexion MET   Increase forearm rotation by at least 10 degrees in supination and pronation  MET   Reduce edema to a minimal level MET   Reduce pain at rest to less than 2/10 PROGRESSING    LTG  By discharge   Achieve functional wrist arc, >80 degrees MET   Achieve functional FA arc > 160 MET   Achieve composite flexion of all digits MET   Achieve  strength to at least 50% of the uninvolved PROGRESSING   Achieve FOTO goals [facility]    Plan  Patient would benefit from: skilled occupational therapy  Planned modality interventions: thermotherapy: hydrocollator packs, ultrasound and unattended electrical stimulation  Planned therapy interventions: joint mobilization, manual therapy, massage, neuromuscular re-education, orthotic management and training, patient education, strengthening, stretching, therapeutic activities, therapeutic exercise, home exercise program, graded exercise, graded activity, functional ROM exercises and fine motor coordination training  Frequency: 2x week  Duration in weeks: 6  Plan of Care beginning date: 8/10/2022  Plan of Care expiration date: 2022  Treatment plan discussed with: patient and family        Subjective Evaluation    History of Present Illness  Mechanism of injury: Non trauma left wrist pain from prolonged holding of phone              Recurrent probem    Quality of life: good    Pain  Current pain ratin  Quality: tight, pressure and discomfort  Relieving factors: rest and medications  Aggravating factors: lifting  Progression: improved    Social Support    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, 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)  Hand dominance: right      Diagnostic Tests  X-ray: normal  Treatments  Previous treatment: occupational therapy  Current treatment: occupational therapy  Patient Goals  Patient goals for therapy: decreased pain, increased motion, increased strength, independence with ADLs/IADLs and return to sport/leisure activities  Patient goal: Be able to do all self care, wash dishes, do usual household        Objective     Observations     Left Wrist/Hand   Negative for edema  Neurological Testing     Sensation     Wrist/Hand   Left   Intact: light touch    Active Range of Motion     Left Wrist   Wrist flexion: 45 degrees   Wrist extension: 62 degrees     Right Wrist   Normal active range of motion  Wrist flexion: 60 degrees   Wrist extension: 62 degrees     Strength/Myotome Testing     Additional Strength Details  Jackson #2  L  29 3 lbs,  R  50 3 lbs    Lat pinch  L  6 7 lbs,  R  13 4 lbs  Swelling     Left Wrist/Hand   Circumference wrist: 15 5 cm    Right Wrist/Hand   Circumference wrist: 15 4 cm       Access Code: [email or web address]  URL: [email or web address]  Date: 2022  Prepared by: Suero Kaylah    Exercises  · Seated Wrist Extension AROM - 3 x daily - 7 x weekly - 1 sets - 10 reps  · Wrist AROM Radial Ulnar Deviation - 3 x daily - 7 x weekly - 1 sets - 10 reps  · Wrist Tendon Gliding - 3 x daily - 7 x weekly - 1 sets - 10 reps  · Thumb AROM Opposition To All Fingers - 3 x daily - 7 x weekly - 1 sets - 10 reps             Daily Note     Today's date: 8/10/2022  Patient name: Pearl Pradhan  : 1945  MRN: [id number]  Referring provider: Zenaida Mckeon PA-C  Dx:   Encounter Diagnosis     ICD-10-CM    1   Left wrist pain  M25 532                   Subjective:  " I wear the splint and I have much less pain"  3/10  " Dishes are tough"  Scrubbing   " I couldn't lift my grandchild"  32 lbs        Objective: See treatment diary below for clinic program   Progressing PRE's as tolerated  Assessment: Tolerated treatment well  Patient would benefit from continued OT ;  Pain at rest greatly reduced  Plan: Progress treatment as tolerated  Wean splint as pain allows  Progress PRE as wrist pain allows  Precautions:  Diabetic    Access Code: [email or web address]  URL: [email or web address]  Date: 08/10/2022  Prepared by: Enma Litter    Exercises  · Wrist Prayer Stretch at Table - 1 x daily - 7 x weekly - 3 sets - 10 reps  · Seated Wrist Flexion Stretch - 1 x daily - 7 x weekly - 3 sets - 10 reps  · Seated Wrist Extension with Dumbbell - 1 x daily - 7 x weekly - 3 sets - 10 reps  · Seated Wrist Radial Deviation with Dumbbell - 1 x daily - 7 x weekly - 3 sets - 10 reps  · Seated Wrist Flexion with Dumbbell - 1 x daily - 7 x weekly - 3 sets - 10 reps          Date 6/20 6/23 6/28 6/30 7/5 7/7 8/10      Visit 1 2 3 4 5 6 7      Manuals  15' 10' 10'   10'      IASTM  Volar FA, ECU  10' Wrist, CMC 5' Wrist CMC  5'         Jt mob  Wrist G1, thumb CMC  5' Wrist   G1, thumb CMC  5' Wrist G1  Thumb CMC  5'   Wrist, thumb  6'      Kinesiotape       Volar wrist  4'                   Neuro Re-Ed      Jonathan Simental, vibrate  8'                                                                                                  Ther Ex 15'   25'   25'   25'   25'   25'   30'      HEP Wrist AROM, digit TGE, splint wear    Splint off seated  Light;  PROM digits 6'     HEP- wrist F, wrist PRE E, RD, F  2x10  15'      PROM 1:1  Gentle FA, wrist, digits 8' FA, wrist, digits  8' FA, wrist, digits  8' FA, wrist, digits  6' FA, wrist digits  6' FA wrist digits  6'      UE warm up      UBE NV       PROM wrist     Prayer  10x10 Prayer  10x10 10x10      PRE FA  Wire maze  x4 Wire maze  x4 Y F bar  S/P  2x10 Y F bar  S/P   2x10 #1  2x10   S/P       AROM digits  Gems  P/u, lay down  5x6 Gems p/u, lay down  5x5 PRE wrist  Y F bar  E 1x10  RD   1x10 Y F bar  E 1x10 Y F bar  Wrist E  2x10 Y F bar  Wrist E  2x10 #1  2x10  wedge E, RD  F  #1  1x10  each      PRE FA/wrist   Y F bar  [place]  x20 Y F bar  [place]  x20 Y F bar  [place]  1x10 3"  Reverse R F bar   [place]  Reverse 1x10                                 Ther Activity     10'   10'   13'   10'       Gripping   #1  5x5 #1  5x5 #1  5x5 #1  5x5       Pinching   R C pin  Full web R C pins  Full web R C pins  Full web         isometric      Dowel into putty x40 Dowel into putty  x50       Gait Training                                       Modalities             MHP pre   5' 5' 5' 5' 5'

## 2022-08-12 ENCOUNTER — OFFICE VISIT (OUTPATIENT)
Dept: OCCUPATIONAL THERAPY | Facility: CLINIC | Age: 77
End: 2022-08-12
Payer: COMMERCIAL

## 2022-08-12 DIAGNOSIS — M25.532 LEFT WRIST PAIN: Primary | ICD-10-CM

## 2022-08-12 PROCEDURE — 97110 THERAPEUTIC EXERCISES: CPT | Performed by: OCCUPATIONAL THERAPIST

## 2022-08-12 PROCEDURE — 97140 MANUAL THERAPY 1/> REGIONS: CPT | Performed by: OCCUPATIONAL THERAPIST

## 2022-08-12 NOTE — PROGRESS NOTES
Daily Note     Today's date: 2022  Patient name: Jono Archer  : 1945  MRN: [id number]  Referring provider: Kendy Small PA-C  Dx:   Encounter Diagnosis     ICD-10-CM    1  Left wrist pain  M25 532                   Subjective:  " I wear the splint and I have much less pain"  3/10  " Dishes are tough"  Scrubbing   " I couldn't lift my grandchild"  31 lbs  Objective: See treatment diary below for clinic program   Progressing PRE's as tolerated  Active Range of Motion     Left Wrist   Wrist flexion: 45 degrees   Wrist extension: 62 degrees     Right Wrist   Normal active range of motion  Wrist flexion: 60 degrees   Wrist extension: 62 degrees     Strength/Myotome Testing     Additional Strength Details  [place] #2  L  29 3 lbs,  R  50 3 lbs    Lat pinch  L  6 7 lbs,  R  13 4 lbs  Swelling     Left Wrist/Hand   Circumference wrist: 15 5 cm    Right Wrist/Hand   Circumference wrist: 15 4 cm      Assessment: Tolerated treatment well  Patient would benefit from continued OT ;  Pain at rest greatly reduced  Plan: Progress treatment as tolerated  Wean splint as pain allows  Progress PRE as wrist pain allows  Cont  kinesiotaping over CT for pain reduction  Precautions:  Diabetic    Access Code: [email or web address]  URL: [email or web address]  Date: 08/10/2022  Prepared by: Marcus Yancey    Exercises  · Wrist Prayer Stretch at Table - 1 x daily - 7 x weekly - 3 sets - 10 reps  · Seated Wrist Flexion Stretch - 1 x daily - 7 x weekly - 3 sets - 10 reps  · Seated Wrist Extension with Dumbbell - 1 x daily - 7 x weekly - 3 sets - 10 reps  · Seated Wrist Radial Deviation with Dumbbell - 1 x daily - 7 x weekly - 3 sets - 10 reps  · Seated Wrist Flexion with Dumbbell - 1 x daily - 7 x weekly - 3 sets - 10 reps          Date 6/20 6/23 6/28 6/30 7/5 7/7 8/10 8/12     Visit 1 2 3 4 5 6 7 8     Manuals  15' 10' 10'   10' 10'     IASTM  Volar FA, ECU  10' Wrist, [facility] 5' Wrist CMC  5' Jt mob  Wrist G1, thumb CMC  5' Wrist   G1, thumb CMC  5' Wrist G1  Thumb CMC  5'   Wrist, thumb  6' Wrist thumb  6'     Kinesiotape       Volar wrist  4' Volar  Wrist  4'                  Neuro Re-Ed      Esperanza Rod, dipikaate  8'                                                                                                  Ther Ex 15'   25'   25'   25'   25'   25'   30'   30'     HEP Wrist AROM, digit TGE, splint wear    Splint off seated  Light;  PROM digits 6'     HEP- wrist F, wrist PRE E, RD, F  2x10  15'          PROM 1:1  Gentle FA, wrist, digits 8' FA, wrist, digits  8' FA, wrist, digits  8' FA, wrist, digits  6' FA, wrist digits  6' FA wrist digits  6' FA wrist digits  6'     UE warm up      UBE NV       PROM wrist     Prayer  10x10 Prayer  10x10 10x10 10x10     PRE FA  Wire maze  x4 Wire maze  x4 Y F bar  S/P  2x10 Y F bar  S/P   2x10 #1  2x10   S/P  #1  2x10     AROM digits  Gems  P/u, lay down  5x6 Gems p/u, lay down  5x5          PRE wrist  Y F bar  E 1x10  RD   1x10 Y F bar  E 1x10 Y F bar  Wrist E  2x10 Y F bar  Wrist E  2x10 #1  2x10  wedge E, RD  F  #1  1x10  each E, RD  F  #1  2x10 each     PRE FA/wrist   Y F bar  [place]  x20 Y F bar  [place]  x20 Y F bar  [place]  1x10 3"  Reverse R F bar   [place]  Reverse 1x10 R F bar rainbow  Reverse  2x10 R F bar  [place]  Reverse  2x10                               Ther Activity     10'   10'   13'   10' 10'   10'     Gripping   #1  5x5 #1  5x5 #1  5x5 #1  5x5 #1  5 blocks, t/F x10 #1  5 blocks  x10     Pinching   R C pin  Full web R C pins  Full web R C pins  Full web         isometric      Dowel into putty x40 Dowel into putty  x50 Dowel into putty  x50 Dowel into putty  x50     Gait Training                                       Modalities             MHP pre   5' 5' 5' 5' 5' 5'

## 2022-08-16 ENCOUNTER — OFFICE VISIT (OUTPATIENT)
Dept: OCCUPATIONAL THERAPY | Facility: CLINIC | Age: 77
End: 2022-08-16
Payer: COMMERCIAL

## 2022-08-16 DIAGNOSIS — M25.532 LEFT WRIST PAIN: Primary | ICD-10-CM

## 2022-08-16 PROCEDURE — 97110 THERAPEUTIC EXERCISES: CPT | Performed by: OCCUPATIONAL THERAPIST

## 2022-08-16 PROCEDURE — 97530 THERAPEUTIC ACTIVITIES: CPT | Performed by: OCCUPATIONAL THERAPIST

## 2022-08-16 NOTE — PROGRESS NOTES
Daily Note     Today's date: 2022  Patient name: Rafael Simon  : 1945  MRN: [id number]  Referring provider: Torri Newman PA-C  Dx:   Encounter Diagnosis     ICD-10-CM    1  Left wrist pain  M25 532                   Subjective:  " I wear the splint and I have much less pain"  " The pain is only in the wrist now (volar)"      Objective: See treatment diary below for clinic program   Progressing PRE's as tolerated  Active Range of Motion     Left Wrist   Wrist flexion: 60 degrees   Wrist extension: 62 degrees     Right Wrist   Normal active range of motion  Wrist flexion: 60 degrees   Wrist extension: 62 degrees     Strength/Myotome Testing     Additional Strength Details  [place] #2  L  29 3 lbs,  R  50 3 lbs    Lat pinch  L  6 7 lbs,  R  13 4 lbs  Swelling     Left Wrist/Hand   Circumference wrist: 15 5 cm    Right Wrist/Hand   Circumference wrist: 15 4 cm      Assessment: Tolerated treatment well  Patient would benefit from continued OT ;  Pain at rest greatly reduced overall  Plan: Progress treatment as tolerated  Wean splint as pain allows  Progress PRE as wrist pain allows  Cont  kinesiotaping over CT for pain reduction  Precautions:  Diabetic    Access Code: [email or web address]  URL: [email or web address]  Date: 08/10/2022  Prepared by: Janes Perez    Exercises  · Wrist Prayer Stretch at Table - 1 x daily - 7 x weekly - 3 sets - 10 reps  · Seated Wrist Flexion Stretch - 1 x daily - 7 x weekly - 3 sets - 10 reps  · Seated Wrist Extension with Dumbbell - 1 x daily - 7 x weekly - 3 sets - 10 reps  · Seated Wrist Radial Deviation with Dumbbell - 1 x daily - 7 x weekly - 3 sets - 10 reps  · Seated Wrist Flexion with Dumbbell - 1 x daily - 7 x weekly - 3 sets - 10 reps          Date 6/20 6/23 6/28 6/30 7/5 7/7 8/10 8/12 8/16    Visit 1 2 3 4 5 6 7 8 9    Manuals  15' 10' 10'   10' 10' 8'    IASTM  Volar FA, ECU  10' Wrist, CMC 5' Wrist CMC  5'         Jt mob  Wrist G1, thumb CMC  5' Wrist   G1, thumb CMC  5' Wrist G1  Thumb CMC  5'   Wrist, thumb  6' Wrist thumb  6' Wrist thumb  4'    Kinesiotape       Volar wrist  4' Volar  Wrist  4' Volar wrist  4'                 Neuro Re-Ed      Yulisa Sanders, vibrate  8'                                                                                                  Ther Ex 15'   25'   25'   25'   25'   25'   30'   30'   25'    HEP Wrist AROM, digit TGE, splint wear    Splint off seated  Light;  PROM digits 6'     HEP- wrist F, wrist PRE E, RD, F  2x10  15'          PROM 1:1  Gentle FA, wrist, digits 8' FA, wrist, digits  8' FA, wrist, digits  8' FA, wrist, digits  6' FA, wrist digits  6' FA wrist digits  6' FA wrist digits  6' FA wrist digits  6'    UE warm up             PROM wrist     Prayer  10x10 Prayer  10x10 10x10 10x10 10x10  Prayer, stand   Load  10x10    PRE FA  Wire maze  x4 Wire maze  x4 Y F bar  S/P  2x10 Y F bar  S/P   2x10 #1  2x10   S/P  #1  2x10 #1  2x10    AROM digits  Gems  P/u, lay down  5x6 Gems p/u, lay down  5x5          PRE wrist  Y F bar  E 1x10  RD   1x10 Y F bar  E 1x10 Y F bar  Wrist E  2x10 Y F bar  Wrist E  2x10 #1  2x10  wedge E, RD  F  #1  1x10  each E, RD  F  #1  2x10 each E, RD, F  #1  2x10  each    PRE FA/wrist   Y F bar  [place]  x20 Y F bar  [place]  x20 Y F bar  [place]  1x10 3"  Reverse R F bar   [place]  Reverse 1x10 R F bar rainbow  Reverse  2x10 R F bar  [place]  Reverse  2x10 R F bar  [place]  Reverse  2x10             P/P  S/S  1x10                 Ther Activity     10'   10'   13'   10' 10'   10' 10'    Gripping   #1  5x5 #1  5x5 #1  5x5 #1  5x5 #1  5 blocks, t/F x10 #1  5 blocks  x10 #1  5 blocks x10    Pinching   R C pin  Full web R C pins  Full web R C pins  Full web         isometric      Dowel into putty x40 Dowel into putty  x50 Dowel into putty  x50 Dowel into putty  x50 Dowel into putty  x50    Gait Training                                       Modalities             MHP pre   5' 5' 5' 5' 5' 5' 5'

## 2022-08-18 ENCOUNTER — OFFICE VISIT (OUTPATIENT)
Dept: OCCUPATIONAL THERAPY | Facility: CLINIC | Age: 77
End: 2022-08-18
Payer: COMMERCIAL

## 2022-08-18 DIAGNOSIS — M25.532 LEFT WRIST PAIN: Primary | ICD-10-CM

## 2022-08-18 PROCEDURE — 97110 THERAPEUTIC EXERCISES: CPT | Performed by: OCCUPATIONAL THERAPIST

## 2022-08-18 PROCEDURE — 97530 THERAPEUTIC ACTIVITIES: CPT | Performed by: OCCUPATIONAL THERAPIST

## 2022-08-18 NOTE — PROGRESS NOTES
Daily Note     Today's date: 2022  Patient name: Pearl Pradhan  : 1945  MRN: [id number]  Referring provider: Zenaida Mckeon PA-C  Dx:   Encounter Diagnosis     ICD-10-CM    1  Left wrist pain  M25 532                   Subjective:  " I wear the splint and I have much less pain"  " The pain is only in the wrist now (volar)"      Objective: See treatment diary below for clinic program   Progressing PRE's as tolerated  Active Range of Motion     Left Wrist   Wrist flexion: 60 degrees   Wrist extension: 62 degrees     Right Wrist   Normal active range of motion  Wrist flexion: 60 degrees   Wrist extension: 62 degrees     Strength/Myotome Testing     Additional Strength Details  Jackson #2  L  29 3 lbs,  R  50 3 lbs    Lat pinch  L  6 7 lbs,  R  13 4 lbs  Swelling     Left Wrist/Hand   Circumference wrist: 15 5 cm    Right Wrist/Hand   Circumference wrist: 15 4 cm      Assessment: Tolerated treatment well  Patient would benefit from continued OT ;  Pain at rest greatly reduced overall  Plan: Progress treatment as tolerated  Wean splint as pain allows  Progress PRE as wrist pain allows  Cont  kinesiotaping over CT for pain reduction  [place] record      Precautions:  Diabetic    Access Code: [address]: [email or web address]  Date: 08/10/2022  Prepared by: Pio Adrian    Exercises  · Wrist Prayer Stretch at Table - 1 x daily - 7 x weekly - 3 sets - 10 reps  · Seated Wrist Flexion Stretch - 1 x daily - 7 x weekly - 3 sets - 10 reps  · Seated Wrist Extension with Dumbbell - 1 x daily - 7 x weekly - 3 sets - 10 reps  · Seated Wrist Radial Deviation with Dumbbell - 1 x daily - 7 x weekly - 3 sets - 10 reps  · Seated Wrist Flexion with Dumbbell - 1 x daily - 7 x weekly - 3 sets - 10 reps          Date  7/5 7/7 8/10 8/12 8/16 8/18   Visit 1 2 3 4 5 6 7 8 9 10   Manuals  15' 10' 10'   10' 10' 8' 8'   IASTM  Volar FA, ECU  10' Wrist, Aia 16 5' Wrist CMC  5' Jt mob  Wrist G1, thumb CMC  5' Wrist   G1, thumb CMC  5' Wrist G1  Thumb CMC  5'   Wrist, thumb  6' Wrist thumb  6' Wrist thumb  4'    Kinesiotape       Volar wrist  4' Volar  Wrist  4' Volar wrist  4' HEP                Neuro Re-Ed      Fiona Skeens, vibrate  8'                                                                                                  Ther Ex 15'   25'   25'   25'   25'   25'   30'   30'   25'  30'     HEP Wrist AROM, digit TGE, splint wear    Splint off seated  Light;  PROM digits 6'     HEP- wrist F, wrist PRE E, RD, F  2x10  15'       W PRE increase to hammer;  Wrist load stretch   PROM 1:1  [place] FA, wrist, digits 8' FA, wrist, digits  8' FA, wrist, digits  8' FA, wrist, digits  6' FA, wrist digits  6' FA wrist digits  6' FA wrist digits  6' FA wrist digits  6' FA wrist digits  6'   UE warm up             PROM wrist     Prayer  10x10 Prayer  10x10 10x10 10x10 10x10  Prayer, stand   Load  10x10 Prayer   10x10   PRE FA  Wire maze  x4 Wire maze  x4 Y F bar  S/P  2x10 Y F bar  S/P   2x10 #1  2x10   S/P  #1  2x10 #1  2x10 #2  2x10   AROM digits  Gems  P/u, lay down  5x6 Gems p/u, lay down  5x5          PRE wrist  Y F bar  E 1x10  RD   1x10 Y F bar  E 1x10 Y F bar  Wrist E  2x10 Y F bar  Wrist E  2x10 #1  2x10  wedge E, RD  F  #1  1x10  each E, RD  F  #1  2x10 each E, RD, F  #1  2x10  each E RD F  #1  1x10  1 1/2  1x10   PRE FA/wrist   Y F bar  [place]  x20 Y F bar  Anderson  x20 Y F bar  [place]  1x10 3"  Reverse R F bar   [place]  Reverse 1x10 R F bar rainbow  Reverse  2x10 R F bar  [place]  Reverse  2x10 R F bar  [place]  Reverse  2x10 G F bar rainbowR F bar  Reverse  2x10            P/P  S/S  1x10 P/P  S/S  1x10 each                Ther Activity     10'   10'   13'   10' 10'   10' 10'   10'   Gripping   #1  5x5 #1  5x5 #1  5x5 #1  5x5 #1  5 blocks, t/F x10 #1  5 blocks  x10 #1  5 blocks x10 #1  5x10    Pinching   R C pin  Full web R C pins  Full web R C pins  Full web         isometric      Dowel into putty x40 Dowel into putty  x50 Dowel into putty  x50 Dowel into putty  x50 Dowel into putty  x50 Dowel into putty   x50   Gait Training                                       Modalities             MHP pre   5' 5' 5' 5' 5' 5' 5'

## 2022-08-23 ENCOUNTER — OFFICE VISIT (OUTPATIENT)
Dept: OCCUPATIONAL THERAPY | Facility: CLINIC | Age: 77
End: 2022-08-23
Payer: COMMERCIAL

## 2022-08-23 DIAGNOSIS — M25.532 LEFT WRIST PAIN: Primary | ICD-10-CM

## 2022-08-23 PROCEDURE — 97530 THERAPEUTIC ACTIVITIES: CPT | Performed by: OCCUPATIONAL THERAPIST

## 2022-08-23 PROCEDURE — 97110 THERAPEUTIC EXERCISES: CPT | Performed by: OCCUPATIONAL THERAPIST

## 2022-08-23 NOTE — PROGRESS NOTES
Daily Note     Today's date: 2022  Patient name: Lilliam Zaman  : 1945  MRN: [id number]  Referring provider: Sheree Curling, PA-C  Dx:   Encounter Diagnosis     ICD-10-CM    1  Left wrist pain  M25 532                   Subjective:  " I stopped wearing the splint"   " The pain is only in the wrist now (volar)"  "I can  a half gallon of milk now"      Objective: See treatment diary below for clinic program   Progressing PRE's as tolerated  Active Range of Motion     Left Wrist   Wrist flexion: 60 degrees   Wrist extension: 62 degrees     Right Wrist   Normal active range of motion  Wrist flexion: 60 degrees   Wrist extension: 62 degrees     Strength/Myotome Testing     Additional Strength Details  [place] #2  L  29 3 lbs,  R  50 3 lbs    Lat pinch  L  6 7 lbs,  R  13 4 lbs  Swelling     Left Wrist/Hand   Circumference wrist: 15 5 cm    Right Wrist/Hand   Circumference wrist: 15 4 cm      Assessment: Tolerated treatment well  Patient would benefit from continued OT ;  Pain at rest greatly reduced overall  Plan: Progress treatment as tolerated  Wean splint as pain allows  Progress PRE as wrist pain allows  Cont  kinesiotaping in HEP over CT for pain reduction  [place] record      Precautions:  Diabetic    Access Code: [address]: [email or web address]  Date: 08/10/2022  Prepared by: Keyona Dubon    Exercises  · Wrist Prayer Stretch at Table - 1 x daily - 7 x weekly - 3 sets - 10 reps  · Seated Wrist Flexion Stretch - 1 x daily - 7 x weekly - 3 sets - 10 reps  · Seated Wrist Extension with Dumbbell - 1 x daily - 7 x weekly - 3 sets - 10 reps  · Seated Wrist Radial Deviation with Dumbbell - 1 x daily - 7 x weekly - 3 sets - 10 reps  · Seated Wrist Flexion with Dumbbell - 1 x daily - 7 x weekly - 3 sets - 10 reps          Date 8/23    7/5 7/7 8/10 8/12 8/16 8/18   Visit 11    5 6 7 8 9 10   Manuals       10' 10' 8' 8'   IASRADHA             Jt mob       Wrist, thumb  6' Wrist thumb  6' Wrist thumb  4'    Kinesiotape       Volar wrist  4' Volar  Wrist  4' Volar wrist  4' HEP                Neuro Re-Ed      Guy Nasreen, vibrate  8'                                                                                                  Ther Ex 30'      25'   25'   30'   30'   25'  30'     HEP K taping, lifting, carrying      HEP- wrist F, wrist PRE E, RD, F  2x10  15'       W PRE increase to hammer;  Wrist load stretch   PROM 1:1 FA and wrist  6'    FA, wrist, digits  6' FA, wrist digits  6' FA wrist digits  6' FA wrist digits  6' FA wrist digits  6' FA wrist digits  6'   UE warm up             PROM wrist Prayer  10x10    Prayer  10x10 Prayer  10x10 10x10 10x10 10x10  Prayer, stand   Load  10x10 Prayer   10x10   PRE FA #2x10    Y F bar  S/P   2x10 #1  2x10   S/P  #1  2x10 #1  2x10 #2  2x10   AROM digits             PRE wrist E RD F  #1  1x10, each    Y F bar  Wrist E  2x10 #1  2x10  wedge E, RD  F  #1  1x10  each E, RD  F  #1  2x10 each E, RD, F  #1  2x10  each E RD F  #1  1x10  1 1/2  1x10   PRE FA/wrist G F bar rainbow x10  R F bar  Reverse  P/P  S/S  1x10 each    Y F bar  [place]  1x10 3"  Reverse R F bar   [place]  Reverse 1x10 R F bar rainbow  Reverse  2x10 R F bar  [place]  Reverse  2x10 R F bar  [place]  Reverse  2x10 G F bar rainbowR F bar  Reverse  2x10            P/P  S/S  1x10 P/P  S/S  1x10 each                Ther Activity 10'      13'   10' 10'   10' 10'   10'   Gripping #1  5x10    #1  5x5 #1  5x5 #1  5 blocks, t/F x10 #1  5 blocks  x10 #1  5 blocks x10 #1  5x10    Pinching     R C pins  Full web         isometric Dowel   Into putty  x50     Dowel into putty x40 Dowel into putty  x50 Dowel into putty  x50 Dowel into putty  x50 Dowel into putty  x50 Dowel into putty   x50   Gait Training                                       Modalities             MHP pre 5'    5' 5' 5' 5' 5' 5'

## 2022-08-25 ENCOUNTER — OFFICE VISIT (OUTPATIENT)
Dept: OCCUPATIONAL THERAPY | Facility: CLINIC | Age: 77
End: 2022-08-25
Payer: COMMERCIAL

## 2022-08-25 DIAGNOSIS — M25.532 LEFT WRIST PAIN: Primary | ICD-10-CM

## 2022-08-25 PROCEDURE — 97110 THERAPEUTIC EXERCISES: CPT | Performed by: OCCUPATIONAL THERAPIST

## 2022-08-25 PROCEDURE — 97530 THERAPEUTIC ACTIVITIES: CPT | Performed by: OCCUPATIONAL THERAPIST

## 2022-08-25 NOTE — PROGRESS NOTES
Daily Note     Today's date: 2022  Patient name: Jakob Garza  : 1945  MRN: [id number]  Referring provider: Shauna Woodard PA-C  Dx:   Encounter Diagnosis     ICD-10-CM    1  Left wrist pain  M25 532                   Subjective:  " I stopped wearing the splint"   " The pain is only in the wrist now (volar)"  "I can  a half gallon of milk now"      Objective: See treatment diary below for clinic program   Progressing PRE's as tolerated  Active Range of Motion     Left Wrist   Wrist flexion: 60 degrees   Wrist extension: 62 degrees     Right Wrist   Normal active range of motion  Wrist flexion: 60 degrees   Wrist extension: 62 degrees     Strength/Myotome Testing     Additional Strength Details  Jackson #2  L  30 9  lbs,  R  50 3 lbs    Lat pinch  L  8 2  lbs,  R  13 4 lbs  Swelling     Left Wrist/Hand   Circumference wrist: 15 5 cm    Right Wrist/Hand   Circumference wrist: 15 4 cm      Assessment: Tolerated treatment well  Patient would benefit from continued OT ;  Pain at rest greatly reduced overall  Plan: Progress treatment as tolerated  Wean splint as pain allows  Progress PRE as wrist pain allows  Cont  kinesiotaping in HEP over CT for pain reduction  Precautions:  Diabetic    Access Code: [email or web address]  URL: [email or web address]  Date: 08/10/2022  Prepared by: Michelle Gonzales    Exercises  · Wrist Prayer Stretch at Table - 1 x daily - 7 x weekly - 3 sets - 10 reps  · Seated Wrist Flexion Stretch - 1 x daily - 7 x weekly - 3 sets - 10 reps  · Seated Wrist Extension with Dumbbell - 1 x daily - 7 x weekly - 3 sets - 10 reps  · Seated Wrist Radial Deviation with Dumbbell - 1 x daily - 7 x weekly - 3 sets - 10 reps  · Seated Wrist Flexion with Dumbbell - 1 x daily - 7 x weekly - 3 sets - 10 reps          Date 8/23 8/25   7/5 7/7 8/10 8/12 8/16 8/18   Visit 11 12   5 6 7 8 9 10   Manuals       10' 10' 8' 8'   IASTM             Jt mob       Wrist, thumb  6' Wrist thumb  6' Wrist thumb  4'    Kinesiotape       Volar wrist  4' Volar  Wrist  4' Volar wrist  4' HEP                Neuro Re-Ed      Marilee Anguiano, vibrate  8'                                                                                                  Ther Ex 30'    30'     25'   25'   30'   30'   25'  30'     HEP K taping, lifting, carrying      HEP- wrist F, wrist PRE E, RD, F  2x10  15'       W PRE increase to hammer;  Wrist load stretch   PROM 1:1 FA and wrist  6' FA and wrist   6'   FA, wrist, digits  6' FA, wrist digits  6' FA wrist digits  6' FA wrist digits  6' FA wrist digits  6' FA wrist digits  6'   UE warm up             PROM wrist Prayer  10x10 Prayer  10x10   Prayer  10x10 Prayer  10x10 10x10 10x10 10x10  Prayer, stand   Load  10x10 Prayer   10x10   PRE FA #2x10 #2  2x10   Y F bar  S/P   2x10 #1  2x10   S/P  #1  2x10 #1  2x10 #2  2x10   AROM digits             PRE wrist E RD F  #1  1x10, each E, RD F  #1  1x10  each   Y F bar  Wrist E  2x10 #1  2x10  wedge E, RD  F  #1  1x10  each E, RD  F  #1  2x10 each E, RD, F  #1  2x10  each E RD F  #1  1x10  1 1/2  1x10   PRE FA/wrist G F bar rainbow x10  R F bar  Reverse  P/P  S/S  1x10 each G F bar  [place]  x20  G F bar  Reverse   P/P  S/S  1x10 each   Y F bar  [place]  1x10 3"  Reverse R F bar   [place]  Reverse 1x10 R F bar rainbow  Reverse  2x10 R F bar  [place]  Reverse  2x10 R F bar  [place]  Reverse  2x10 G F bar rainbowR F bar  Reverse  2x10            P/P  S/S  1x10 P/P  S/S  1x10 each                Ther Activity 10'   10'     13'   10' 10'   10' 10'   10'   Gripping #1  5x10 #1  5x10   #1  5x5 #1  5x5 #1  5 blocks, t/F x10 #1  5 blocks  x10 #1  5 blocks x10 #1  5x10    Pinching     R C pins  Full web         isometric Dowel   Into putty  x50 Dowel into putty  x50    Dowel into putty x40 Dowel into putty  x50 Dowel into putty  x50 Dowel into putty  x50 Dowel into putty  x50 Dowel into putty   x50   Gait Training Modalities             MHP pre 5' 5'   5' 5' 5' 5' 5' 5'

## 2022-08-30 ENCOUNTER — OFFICE VISIT (OUTPATIENT)
Dept: OCCUPATIONAL THERAPY | Facility: CLINIC | Age: 77
End: 2022-08-30
Payer: COMMERCIAL

## 2022-08-30 DIAGNOSIS — M25.532 LEFT WRIST PAIN: Primary | ICD-10-CM

## 2022-08-30 PROCEDURE — 97110 THERAPEUTIC EXERCISES: CPT | Performed by: OCCUPATIONAL THERAPIST

## 2022-08-30 PROCEDURE — 97530 THERAPEUTIC ACTIVITIES: CPT | Performed by: OCCUPATIONAL THERAPIST

## 2022-08-30 NOTE — PROGRESS NOTES
Daily Note     Today's date: 2022  Patient name: Stoney Figueredo  : 1945  MRN: [id number]  Referring provider: Mihai Matamoros PA-C  Dx:   Encounter Diagnosis     ICD-10-CM    1  Left wrist pain  M25 532                   Subjective:  " I stopped wearing the splint"   " The pain is only in the wrist now (volar)"  "I can  a half gallon of milk "      Objective: See treatment diary below for clinic program   Progressing PRE's as tolerated  Active Range of Motion     Left Wrist   Wrist flexion: 60 degrees   Wrist extension: 62 degrees     Right Wrist   Normal active range of motion  Wrist flexion: 60 degrees   Wrist extension: 62 degrees     Strength/Myotome Testing     Additional Strength Details  Jackson #2  L  30 9  lbs,  R  50 3 lbs    Lat pinch  L  8 2  lbs,  R  13 4 lbs  Swelling     Left Wrist/Hand   Circumference wrist: 15 5 cm    Right Wrist/Hand   Circumference wrist: 15 4 cm      Assessment: Tolerated treatment well  Patient would benefit from continued OT ;  Pain at rest greatly reduced overall  Steady increase of daily function reported  Plan: Progress treatment as tolerated  Splinting discontinued  Progressing wrist   PRE as pain allows  Kinesiotaping  HEP over CT for pain reduction prn  Focus continued care on progression strength and function without increased pain  Precautions:  Diabetic    Access Code: [email or web address]  URL: [email or web address]  Date: 08/10/2022  Prepared by: Nanette Florence    Exercises  · Wrist Prayer Stretch at Table - 1 x daily - 7 x weekly - 3 sets - 10 reps  · Seated Wrist Flexion Stretch - 1 x daily - 7 x weekly - 3 sets - 10 reps  · Seated Wrist Extension with Dumbbell - 1 x daily - 7 x weekly - 3 sets - 10 reps  · Seated Wrist Radial Deviation with Dumbbell - 1 x daily - 7 x weekly - 3 sets - 10 reps  · Seated Wrist Flexion with Dumbbell - 1 x daily - 7 x weekly - 3 sets - 10 reps          Date 8/23 8/25   7/5 7/7 8/10 8/12 8/16 8/18   Visit 11 12   5 6 7 8 9 10   Manuals       10' 10' 8' 8'   IASTM             Jt mob       Wrist, thumb  6' Wrist thumb  6' Wrist thumb  4'    Kinesiotape       Volar wrist  4' Volar  Wrist  4' Volar wrist  4' HEP                Neuro Re-Ed      Marquise Iverson, vibrate  8'                                                                                                  Ther Ex 30'    30'     25'   25'   30'   30'   25'  30'     HEP K taping, lifting, carrying      HEP- wrist F, wrist PRE E, RD, F  2x10  15'       W PRE increase to hammer;  Wrist load stretch   PROM 1:1 FA and wrist  6' FA and wrist   6'   FA, wrist, digits  6' FA, wrist digits  6' FA wrist digits  6' FA wrist digits  6' FA wrist digits  6' FA wrist digits  6'   UE warm up             PROM wrist Prayer  10x10 Prayer  10x10   Prayer  10x10 Prayer  10x10 10x10 10x10 10x10  Prayer, stand   Load  10x10 Prayer   10x10   PRE FA #2x10 #2  2x10   Y F bar  S/P   2x10 #1  2x10   S/P  #1  2x10 #1  2x10 #2  2x10   AROM digits             PRE wrist E RD F  #1  1x10, each E, RD F  #1  1x10  each   Y F bar  Wrist E  2x10 #1  2x10  wedge E, RD  F  #1  1x10  each E, RD  F  #1  2x10 each E, RD, F  #1  2x10  each E RD F  #1  1x10  1 1/2  1x10   PRE FA/wrist G F bar rainbow x10  R F bar  Reverse  P/P  S/S  1x10 each G F bar  [place]  x20  G F bar  Reverse   P/P  S/S  1x10 each   Y F bar  [place]  1x10 3"  Reverse R F bar   [place]  Reverse 1x10 R F bar rainbow  Reverse  2x10 R F bar  [place]  Reverse  2x10 R F bar  [place]  Reverse  2x10 G F bar rainbowR F bar  Reverse  2x10            P/P  S/S  1x10 P/P  S/S  1x10 each                Ther Activity 10'   10'     13'   10' 10'   10' 10'   10'   Gripping #1  5x10 #1  5x10   #1  5x5 #1  5x5 #1  5 blocks, t/F x10 #1  5 blocks  x10 #1  5 blocks x10 #1  5x10    Pinching     R C pins  Full web         isometric Dowel   Into putty  x50 Dowel into putty  x50    Dowel into putty x40 Dowel into putty  x50 Dowel into putty  x50 Dowel into putty  x50 Dowel into putty  x50 Dowel into putty   x50   Gait Training                                       Modalities             MHP pre 5' 5'   5' 5' 5' 5' 5' 5'

## 2022-08-31 ENCOUNTER — OFFICE VISIT (OUTPATIENT)
Dept: OBGYN CLINIC | Facility: CLINIC | Age: 77
End: 2022-08-31
Payer: COMMERCIAL

## 2022-08-31 VITALS
BODY MASS INDEX: 29.88 KG/M2 | HEIGHT: 64 IN | RESPIRATION RATE: 18 BRPM | HEART RATE: 74 BPM | OXYGEN SATURATION: 98 % | DIASTOLIC BLOOD PRESSURE: 60 MMHG | WEIGHT: 175 LBS | SYSTOLIC BLOOD PRESSURE: 104 MMHG

## 2022-08-31 DIAGNOSIS — M19.032 LOCALIZED PRIMARY OSTEOARTHRITIS OF CARPOMETACARPAL (CMC) JOINT OF LEFT WRIST: ICD-10-CM

## 2022-08-31 DIAGNOSIS — G56.02 CARPAL TUNNEL SYNDROME OF LEFT WRIST: Primary | ICD-10-CM

## 2022-08-31 DIAGNOSIS — M77.8 TENDONITIS OF WRIST, LEFT: ICD-10-CM

## 2022-08-31 DIAGNOSIS — M18.12 PRIMARY OSTEOARTHRITIS OF FIRST CARPOMETACARPAL JOINT OF LEFT HAND: ICD-10-CM

## 2022-08-31 PROCEDURE — 99213 OFFICE O/P EST LOW 20 MIN: CPT | Performed by: ORTHOPAEDIC SURGERY

## 2022-08-31 PROCEDURE — 20526 THER INJECTION CARP TUNNEL: CPT | Performed by: ORTHOPAEDIC SURGERY

## 2022-08-31 RX ADMIN — LIDOCAINE HYDROCHLORIDE 1 ML: 10 INJECTION, SOLUTION INFILTRATION; PERINEURAL at 14:41

## 2022-08-31 RX ADMIN — TRIAMCINOLONE ACETONIDE 20 MG: 40 INJECTION, SUSPENSION INTRA-ARTICULAR; INTRAMUSCULAR at 14:41

## 2022-08-31 NOTE — PROGRESS NOTES
HPI:  Patient is a 68y o  year old RHD female  who presents for follow-up of left wrist CMC joint osteoarthritis  Patient was last seen in the office on 07/20/2022 where she was instructed to continue therapy as directed, weight-bearing as tolerated for the left upper extremity, continue with over-the-counter analgesics as needed, bracing as needed  Patient last received an injection to her left thumb CMC joint on 06/08/2022  She reports no problems in regard to her left thumb  She has concerns about her left wrist today  Patient reports therapy has been helping  She notes she is still unable to lift heavy objects; her current restriction she believes is 4 lbs  She also notes greater discomfort when it rains  She says its is uncomfortable to move her wrist in all directions  She notes numbness into all of her digits         ROS:   General: No fever, no chills, no weight loss, no weight gain  HEENT:  No loss of hearing, no nose bleeds, no sore throat  Eyes:  No eye pain, no red eyes, no visual disturbance  Respiratory:  No cough, no shortness of breath, no wheezing  Cardiovascular:  No chest pain, no palpitations, no edema  GI: No abdominal pain, no nausea, no vomiting  Endocrine: No frequent urination, no excessive thirst  Urinary:  No dysuria, no hematuria, no incontinence  Musculoskeletal: see HPI and PE  Skin:  No rash, no wounds  Neurological:  No dizziness, no headache, no numbness  Psychiatric:  No difficulty concentrating, no depression, no suicide thoughts, no anxiety  Review of all other systems is negative    PMH:  Past Medical History:   Diagnosis Date    Arthritis     Colon polyp     Diabetes mellitus ([address] )     Disease of thyroid gland     Elbow fracture     Foot fracture, left     GERD (gastroesophageal reflux disease)     occasional    Hypertension     Irritable bowel syndrome        PSH:  Past Surgical History:   Procedure Laterality Date    APPENDECTOMY      CHOLECYSTECTOMY      DILATION AND CURETTAGE OF UTERUS  1995    IA CMBND ANTERPOST COLPORRAPHY W/CYSTO N/A 7/29/2019    Procedure: A&P COLPORRHAPHY; GRAFT;  Surgeon: Abiodun Monahan MD;  [facility];  Service: UroGynecology           IA CYSTOURETHROSCOPY N/A 7/29/2019    Procedure: Ryanne Christineff;  Surgeon: Abiodun Monahan MD;  [facility];  Service: UroGynecology           IA REVAGINAL PROLAPSE,SACROSP LIG N/A 7/29/2019    Procedure: VE COLPOPEXY;  Surgeon: Abiodun Monahan MD;  [facility];  Service: UroGynecology           IA SLING OPER STRES INCONTINENCE N/A 7/29/2019    Procedure: PUBOVAGINAL SLING;  Surgeon: Abiodun Monahan MD;  [facility];  Service: UroGynecology           TONSILLECTOMY      TONSILLECTOMY         Medications:  Current Outpatient Medications   Medication Sig Dispense Refill    acetaminophen (TYLENOL) 325 mg tablet Take 2 tablets (650 mg total) by mouth every 6 (six) hours as needed for mild pain 30 tablet 0    amLODIPine-benazepril (LOTREL 5-10) 5-10 MG per capsule TAKE 1 CAPSULE BY MOUTH EVERY DAY 90 capsule 3    atorvastatin (LIPITOR) 10 mg tablet TAKE 1 TABLET BY MOUTH EVERY DAY 90 tablet 3    escitalopram (LEXAPRO) 5 mg tablet TAKE 1 TABLET BY MOUTH EVERY DAY 90 tablet 1    IBUPROFEN PO Take by mouth every 6 (six) hours as needed for mild pain      Lancets (ONETOUCH DELICA PLUS [id number]) MISC TEST DAILY 100 each 2    latanoprost (XALATAN) 0 005 % ophthalmic solution INSTILL 1 DROP INTO BOTH EYES AT BEDTIME      levothyroxine 100 mcg tablet TAKE 1 TABLET BY MOUTH EVERY DAY 90 tablet 3    metFORMIN (GLUCOPHAGE-XR) 500 mg 24 hr tablet TAKE 4 TABLETS BY MOUTH EVERY  tablet 1    Multiple Vitamins-Minerals (CENTRUM SILVER) CHEW Chew 1 tablet daily      neomycin-colistin-hydrocortisone-thonzonium (CORTISPORIN TC) 0 33-0 3-1-0 05 % otic suspension Administer 3 drops into both ears 4 (four) times a day (Patient not taking: Reported on 9/15/2022) 10 mL 0    ONE TOUCH ULTRA TEST test strip 1 EACH BY OTHER ROUTE DAILY USE AS INSTRUCTED 100 each 2    OneTouch Delica Lancets 46E MISC TEST 1 DAILY AS INSTRUCTED 100 each 2    spironolactone (ALDACTONE) 25 mg tablet TAKE 1 TABLET BY MOUTH EVERY DAY 90 tablet 3    valACYclovir (VALTREX) 1,000 mg tablet Take 1 tablet (1,000 mg total) by mouth as needed (as needed) 14 tablet 0     No current facility-administered medications for this visit  Allergies: Allergies   Allergen Reactions    Penicillins      Was child- reaction unknown       Family History:  Family History   Problem Relation Age of Onset    Colon cancer Mother         Bowel    Asthma Mother     Heart failure Father         Congestive    Ulcers Father     Heart attack Maternal Grandfather         Myocardial infarction    Stomach cancer Paternal Grandmother     Breast cancer Paternal Aunt     Ovarian cancer Neg Hx        Social History:  Social History     Occupational History    Occupation: Retired   Tobacco Use    Smoking status: Never Smoker    Smokeless tobacco: Never Used   Vaping Use    Vaping Use: Never used   Substance and Sexual Activity    Alcohol use: Yes     Comment: rare, maybe once a year    Drug use: No    Sexual activity: Yes     Partners: Male       Physical Exam:  General :  Alert, cooperative, no distress, appears stated age  Blood pressure 104/60, pulse 74, resp  rate 18, height 5' 4" (1 626 m), weight 79 4 kg (175 lb), SpO2 98 %, not currently breastfeeding  Head:  Normocephalic, without obvious abnormality, atraumatic   Eyes:  Conjunctiva/corneas clear, EOM's intact,   Ears: Both ears normal appearance, no hearing deficits      Nose: Nares normal, septum midline, no drainage    Neck: Supple,  trachea midline, no adenopathy, no tenderness, no mass   Back:   Symmetric, no curvature, ROM normal, no tenderness   Lungs:   Respirations unlabored   Chest Wall:  No tenderness or deformity   Extremities: Extremities normal, atraumatic, no cyanosis or edema      Pulses: 2+ and symmetric   Skin: Skin color, texture, turgor normal, no rashes or lesions      Neurologic: Normal           Left Hand Exam     Tenderness   The patient is experiencing tenderness in the palmar area  Range of Motion   Wrist   Pronation: normal   Supination: normal     Muscle Strength   :  4/5     Tests   Tinel's sign (median nerve): positive    Other   Erythema: absent  Scars: absent  Sensation: decreased (notes numbness at tips of all fingers)  Pulse: present            Imaging Studies: The following imaging studies were reviewed in office today  My findings are noted  1  6/8/22 X-rays of the left hand and wrist demonstrated moderate CMC arthritis of the thumb  No acute fractures or dislocations  Assessment  Encounter Diagnoses   Name Primary?  Carpal tunnel syndrome of left wrist Yes    Localized primary osteoarthritis of carpometacarpal (CMC) joint of left wrist     Primary osteoarthritis of first carpometacarpal joint of left hand     Tendonitis of wrist, left      Hand/upper extremity injection: L carpal tunnel  [place] Protocol:  Consent: Verbal consent obtained  Risks and benefits: risks, benefits and alternatives were discussed  Consent given by: patient  Time out: Immediately prior to procedure a "time out" was called to verify the correct patient, procedure, equipment, support staff and site/side marked as required    Timeout called at: 8/31/2022 2:38 PM   Patient understanding: patient states understanding of the procedure being performed  Site marked: the operative site was marked  Patient identity confirmed: verbally with patient    Supporting Documentation  Indications: pain and therapeutic   Procedure Details  Condition:carpal tunnel syndrome Site: L carpal tunnel   Preparation: Patient was prepped and draped in the usual sterile fashion  Needle size: 27 G  Ultrasound guidance: no  Approach: volar  Medications administered: 1 mL lidocaine 1 %; 20 mg triamcinolone acetonide 40 mg/mL    Patient tolerance: patient tolerated the procedure well with no immediate complications  Dressing:  Sterile dressing applied           Plan:  Gerhardt Sep is a 68 y o   RHD female who presents with left wrist CMC joint osteo   · The patient's x-rays were reviewed again today  · Patient was offered a CSI to the left carpal tunnel; she tolerated the procedure well  · voltaren gel was discussed and she was told she can buy this over-the-counter  · Patient was told to finish her occupational therapy visits; in 1 month if she feels as though she needs more we can renew the prescription  · Follow-up in 1 month     Scribe Attestation    I,:  Kervin Marlow am acting as a scribe while in the presence of the attending physician :       I,:  Kwan Cho MD personally performed the services described in this documentation    as scribed in my presence :

## 2022-09-01 ENCOUNTER — APPOINTMENT (OUTPATIENT)
Dept: OCCUPATIONAL THERAPY | Facility: CLINIC | Age: 77
End: 2022-09-01
Payer: COMMERCIAL

## 2022-09-06 ENCOUNTER — OFFICE VISIT (OUTPATIENT)
Dept: OCCUPATIONAL THERAPY | Facility: CLINIC | Age: 77
End: 2022-09-06
Payer: COMMERCIAL

## 2022-09-06 DIAGNOSIS — M25.532 LEFT WRIST PAIN: Primary | ICD-10-CM

## 2022-09-06 PROCEDURE — 97110 THERAPEUTIC EXERCISES: CPT | Performed by: OCCUPATIONAL THERAPIST

## 2022-09-06 NOTE — PROGRESS NOTES
Daily Note     Today's date: 2022  Patient name: Orlando Mckinley  : 1945  MRN: [id number]  Referring provider: Carmen Tinoco PA-C  Dx:   Encounter Diagnosis     ICD-10-CM    1  Left wrist pain  M25 532                   Subjective:  " I am wearing the splint since I had the injection  I feel much much better"  More than 50% reduction of pain     " The pain is only in the wrist now (volar)"  "The numbness is gone"      Objective: See treatment diary below for clinic program   Progressing PRE's as tolerated  Active Range of Motion     Left Wrist   Wrist flexion: 60 degrees   Wrist extension: 62 degrees     Right Wrist   Normal active range of motion  Wrist flexion: 60 degrees   Wrist extension: 62 degrees     Strength/Myotome Testing     Additional Strength Details  Jackson #2  L  30 9  lbs,  R  50 3 lbs    Lat pinch  L  8 2  lbs,  R  13 4 lbs  Swelling     Left Wrist/Hand   Circumference wrist: 15 5 cm    Right Wrist/Hand   Circumference wrist: 15 4 cm      Assessment: Tolerated treatment well  Patient would benefit from continued OT ;  Pain at rest greatly reduced overall since injection last week  Steady increase of daily function reported  Plan: Progress treatment as tolerated  Splinting may continue as pain and numbness dictate  Resume wrist   PRE as pain allows  Kinesiotaping  HEP for MNGE added  Focus continued care on progression strength and function without increased pain  Precautions:  Diabetic    Access Code: [email or web address]  URL: [email or web address]  Date: 08/10/2022  Prepared by: Gary Bullock    Exercises  · Wrist Prayer Stretch at Table - 1 x daily - 7 x weekly - 3 sets - 10 reps  · Seated Wrist Flexion Stretch - 1 x daily - 7 x weekly - 3 sets - 10 reps  · Seated Wrist Extension with Dumbbell - 1 x daily - 7 x weekly - 3 sets - 10 reps  · Seated Wrist Radial Deviation with Dumbbell - 1 x daily - 7 x weekly - 3 sets - 10 reps  · Seated Wrist Flexion with Dumbbell - 1 x daily - 7 x weekly - 3 sets - 10 reps          Date 8/23 8/25 9/6    8/10 8/12 8/16 8/18   Visit 11 12 13    7 8 9 10   Manuals       10' 10' 8' 8'   IASTM             Jt mob       Wrist, thumb  6' Wrist thumb  6' Wrist thumb  4'    Kinesiotape       Volar wrist  4' Volar  Wrist  4' Volar wrist  4' HEP                Neuro Re-Ed                                                                                                        Ther Ex 30'    30'   25'      30'   30'   25'  30'     HEP K taping, lifting, carrying  MNGE  Added to HEP  8'      HEP- wrist F, wrist PRE E, RD, F  2x10  15'       W PRE increase to hammer;  Wrist load stretch   PROM 1:1 FA and wrist  6' FA and wrist   6' FA and wrist 6'    FA wrist digits  6' FA wrist digits  6' FA wrist digits  6' FA wrist digits  6'   UE warm up             PROM wrist Prayer  10x10 Prayer  10x10 Prayer 10x10    10x10 10x10 10x10  Prayer, stand   Load  10x10 Prayer   10x10   PRE FA #2x10 #2  2x10 #1  2x10     #1  2x10 #1  2x10 #2  2x10   AROM digits             PRE wrist E RD F  #1  1x10, each E, RD F  #1  1x10  each     E, RD  F  #1  1x10  each E, RD  F  #1  2x10 each E, RD, F  #1  2x10  each E RD F  #1  1x10  1 1/2  1x10   PRE FA/wrist G F bar rainbow x10  R F bar  Reverse  P/P  S/S  1x10 each G F bar  [place]  x20  G F bar  Reverse   P/P  S/S  1x10 each Y F bar rainbow  x20 reverse  x20    R F bar rainbow  Reverse  2x10 R F bar  [place]  Reverse  2x10 R F bar  [place]  Reverse  2x10 G F bar rainbowR F bar  Reverse  2x10            P/P  S/S  1x10 P/P  S/S  1x10 each                Ther Activity 10'   10'   HOLD    10'   10' 10'   10'   Gripping #1  5x10 #1  5x10     #1  5 blocks, t/F x10 #1  5 blocks  x10 #1  5 blocks x10 #1  5x10    Pinching              isometric Dowel   Into putty  x50 Dowel into putty  x50     Dowel into putty  x50 Dowel into putty  x50 Dowel into putty  x50 Dowel into putty   x50   Gait Training Modalities             MHP pre 5' 5' 5'    5' 5' 5' 5'

## 2022-09-08 ENCOUNTER — OFFICE VISIT (OUTPATIENT)
Dept: OCCUPATIONAL THERAPY | Facility: CLINIC | Age: 77
End: 2022-09-08
Payer: COMMERCIAL

## 2022-09-08 DIAGNOSIS — M25.532 LEFT WRIST PAIN: Primary | ICD-10-CM

## 2022-09-08 PROCEDURE — 97530 THERAPEUTIC ACTIVITIES: CPT | Performed by: OCCUPATIONAL THERAPIST

## 2022-09-08 PROCEDURE — 97110 THERAPEUTIC EXERCISES: CPT | Performed by: OCCUPATIONAL THERAPIST

## 2022-09-08 NOTE — PROGRESS NOTES
Daily Note     Today's date: 2022  Patient name: Karrie Nissen  : 1945  MRN: [id number]  Referring provider: Jordana Dye PA-C  Dx:   Encounter Diagnosis     ICD-10-CM    1  Left wrist pain  M25 532                   Subjective:  " I am wearing the splint since I had the injection  I feel much much better"  More than 50% reduction of pain     " The pain is only in the wrist now (volar)"  "The numbness is gone"  "I still can't lift a gallon of milk"  8 lbs  Objective: See treatment diary below for clinic program   Progressing PRE's as tolerated  Active Range of Motion     Left Wrist   Wrist flexion: 60 degrees   Wrist extension: 62 degrees     Right Wrist   Normal active range of motion  Wrist flexion: 60 degrees   Wrist extension: 62 degrees     Strength/Myotome Testing     Additional Strength Details  [place] #2  L  30 9  lbs,  R  50 3 lbs    Lat pinch  L  8 2  lbs,  R  13 4 lbs  Swelling     Left Wrist/Hand   Circumference wrist: 15 5 cm    Right Wrist/Hand   Circumference wrist: 15 4 cm      Assessment: Tolerated treatment well  Patient would benefit from continued OT ;  Pain at rest greatly reduced overall since injection last week  Steady increase of daily function reported  Plan: Progress treatment as tolerated  Splinting may continue as pain and numbness dictate  Resume wrist  PRE as pain allows  Kinesiotaping  HEP for MNGE added  Focus continued care on progression strength and function without increased pain  Precautions:  Diabetic    Access Code: [email or web address]  URL: [email or web address]  Date: 08/10/2022  Prepared by: Ketan Tamayo    Exercises  · Wrist Prayer Stretch at Table - 1 x daily - 7 x weekly - 3 sets - 10 reps  · Seated Wrist Flexion Stretch - 1 x daily - 7 x weekly - 3 sets - 10 reps  · Seated Wrist Extension with Dumbbell - 1 x daily - 7 x weekly - 3 sets - 10 reps  · Seated Wrist Radial Deviation with Dumbbell - 1 x daily - 7 x weekly - 3 sets - 10 reps  · Seated Wrist Flexion with Dumbbell - 1 x daily - 7 x weekly - 3 sets - 10 reps          Date 8/23 8/25 9/6 9/8   8/10 8/12 8/16 8/18   Visit 11 12 13 14   7 8 9 10   Manuals       10' 10' 8' 8'   IASTM             Jt mob       Wrist, thumb  6' Wrist thumb  6' Wrist thumb  4'    Kinesiotape       Volar wrist  4' Volar  Wrist  4' Volar wrist  4' HEP                Neuro Re-Ed                                                                                                        Ther Ex 30'    30'   25'   30'     30'   30'   25'  30'     HEP K taping, lifting, carrying  MNGE  Added to HEP  8'   Review PRE- wrist E/F; splint wear   HEP- wrist F, wrist PRE E, RD, F  2x10  15'       W PRE increase to hammer;  Wrist load stretch   PROM 1:1 FA and wrist  6' FA and wrist   6' FA and wrist 6' FA and wrist   6'   FA wrist digits  6' FA wrist digits  6' FA wrist digits  6' FA wrist digits  6'   MNGE    10x10         PROM wrist Prayer  10x10 Prayer  10x10 Prayer 10x10 Prayer   10x10   10x10 10x10 10x10  Prayer, stand   Load  10x10 Prayer   10x10   PRE FA #2x10 #2  2x10 #1  2x10 #1  2x10 mid range    #1  2x10 #1  2x10 #2  2x10   AROM digits             PRE wrist E RD F  #1  1x10, each E, RD F  #1  1x10  each  HEP   E, RD  F  #1  1x10  each E, RD  F  #1  2x10 each E, RD, F  #1  2x10  each E RD F  #1  1x10  1 1/2  1x10   PRE FA/wrist G F bar rainbow x10  R F bar  Reverse  P/P  S/S  1x10 each G F bar  [place]  x20  G F bar  Reverse   P/P  S/S  1x10 each Y F bar rainbow  x20 reverse  x20 Y F bar  [place]  x20  Reverse  x20   R F bar rainbow  Reverse  2x10 R F bar  [place]  Reverse  2x10 R F bar  [place]  Reverse  2x10 G F bar rainbowR F bar  Reverse  2x10            P/P  S/S  1x10 P/P  S/S  1x10 each                Ther Activity 10'   10'   HOLD   8'   10'   10' 10'   10'   Gripping #1  5x10 #1  5x10     #1  5 blocks, t/F x10 #1  5 blocks  x10 #1  5 blocks x10 #1  5x10    Pinching    R C pins  Full [place]  isometric Dowel   Into putty  x50 Dowel into putty  x50  Dowel into putty  x50   Dowel into putty  x50 Dowel into putty  x50 Dowel into putty  x50 Dowel into putty   x50   Gait Training                                       Modalities             P pre 5' 5' 5' 5'   5' 5' 5' 5'

## 2022-09-13 ENCOUNTER — OFFICE VISIT (OUTPATIENT)
Dept: OCCUPATIONAL THERAPY | Facility: CLINIC | Age: 77
End: 2022-09-13
Payer: COMMERCIAL

## 2022-09-13 DIAGNOSIS — M25.532 LEFT WRIST PAIN: Primary | ICD-10-CM

## 2022-09-13 PROCEDURE — 97110 THERAPEUTIC EXERCISES: CPT | Performed by: OCCUPATIONAL THERAPIST

## 2022-09-13 NOTE — PROGRESS NOTES
OT Re-Evaluation  and OT Discharge     Today's date: 2022  Patient name: Stoney Figueredo  : 1945  MRN: [id number]  Referring provider: Mihai Matamoros PA-C  Dx:   Encounter Diagnosis     ICD-10-CM    1  Left wrist pain  M25 532                   Assessment  Assessment details: Audrey Esparza is a 69 y/o RHD female who began having left wrist about 2 months ago from prolonged holding of her phone to read  She reports mild tenderness increasing into severe pain overnight  When pain did not resolve, she went to an orthopedic for care  (-) Xray  She reports receiving  a cortisone injection in the first dorsal compartment area  Date of injection  2022  She reports reduced pain of the thumb and radial side of wrist after the injection  Pain in the volar and dorsal central wrist zone continues and patient holds her hand in a guarded manner due to pain  Examination reveals limited ROM of the wrist, functional ROM of the digits  Pain moderate at rest, increasing with activity  Minimal edema observed and no sensory complaints reported  Poor  and pinch strength due to pain  Evaluation is of low complexity, due to minimal comorbidities and stable clinical presentation  Pt demonstrates good tolerance of therapy today and was provided with a written HEP focusing on gentle active wrist and finger exercises and was instructed to perform exercises daily  The patient demonstrates HEP instructions appropriately, verbalizes understanding, and is in agreement with the written HEP  Pt will benefit from skilled OT intervention to progress motion, resolve pain, achieve functional strength,  and allow return to PLOF  RE 8/10/2022  Audrey Esparza has attended 7 visits and has made excellent progress in motion and strength  Pain continues in volar wrist, mild at rest, increases with resisted tasks  Splint wear has been weaned without increased pain    Therapy was lapsed for one month due to being out of [facility]   Therapy will resume with focus on gaining end range flexion and strength to resume all ADL's without restriction  RE/Discharge Summary  2022  Romayne Doyne has attended 16 therapy visits and had made excellent progression in daily function and strength  After recent injection in wrist, she has resolved sensory complaints and reduction of pain to a minimal level  She is wearing a wrist splint for HOS and during the day for heavier activities  Pain at rest has resolved and pain with function is mild  She is now able to lift a half gallon of milk and pour without increased pain  Romayne Doyne may continue strengthening on HEP with a small wrist weight and putty exercises  Therapy may be discharged as therapy goals are met and FOTO score has been achieved  Impairments: impaired physical strength    Symptom irritability: lowUnderstanding of Dx/Px/POC: excellent  Goals  STG 2 weeks   Increase wrist arc by at least 10 degrees in extension and flexion MET   Increase forearm rotation by at least 10 degrees in supination and pronation  MET   Reduce edema to a minimal level MET   Reduce pain at rest to less than 2/10 MET    LTG  By discharge   Achieve functional wrist arc, >80 degrees MET   Achieve functional FA arc > 160 MET   Achieve composite flexion of all digits MET   Achieve  strength to at least 50% of the uninvolved MET   Achieve FOTO goals established at IE  MET    Plan  Planned therapy interventions: home exercise program  Other planned therapy interventions: Discharge to [facility]  Duration in weeks: 6  Plan of Care beginning date: 8/10/2022  Plan of Care expiration date: 2022  Treatment plan discussed with: patient and family        Subjective Evaluation    History of Present Illness  Mechanism of injury: Non trauma left wrist pain from prolonged holding of phone              Recurrent probem    Quality of life: good    Pain  Current pain ratin  Quality: discomfort  Relieving factors: rest, medications and support  Aggravating factors: lifting  Progression: improved    Social Support    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, 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[employer])  Hand dominance: right      Diagnostic Tests  X-ray: normal  Treatments  Previous treatment: occupational therapy  Current treatment: occupational therapy  Patient Goals  Patient goals for therapy: decreased pain, increased motion, increased strength, independence with ADLs/IADLs and return to sport/leisure activities  Patient goal: Be able to do all self care, wash dishes, do usual household        Objective     Observations     Left Wrist/Hand   Negative for edema  Neurological Testing     Sensation     Wrist/Hand   Left   Intact: light touch    Active Range of Motion     Left Wrist   Normal active range of motion  Wrist flexion: 60 degrees   Wrist extension: 62 degrees     Right Wrist   Normal active range of motion  Wrist flexion: 60 degrees   Wrist extension: 62 degrees     Strength/Myotome Testing     Additional Strength Details  Jackson #2  L  30 9 lbs,  R  50 3 lbs    Lat pinch  L  8 2 lbs,  R  13 4 lbs  Swelling     Left Wrist/Hand   Circumference wrist: 15 5 cm    Right Wrist/Hand   Circumference wrist: 15 4 cm       Access Code: [email or web address]  URL: [email or web address]  Date: 2022  Prepared by: Gary Bullock    Exercises  · Seated Wrist Extension AROM - 3 x daily - 7 x weekly - 1 sets - 10 reps  · Wrist AROM Radial Ulnar Deviation - 3 x daily - 7 x weekly - 1 sets - 10 reps  · Wrist Tendon Gliding - 3 x daily - 7 x weekly - 1 sets - 10 reps  · Thumb AROM Opposition To All Fingers - 3 x daily - 7 x weekly - 1 sets - 10 reps               Daily Note     Today's date: 2022  Patient name: Orlando Mckinley  : 1945  MRN: [id number]  Referring provider: Carmen Tinoco PA-C  Dx:   Encounter Diagnosis     ICD-10-CM    1  Left wrist pain  M25 532                   Subjective:  " I am wearing the splint since I had the injection    I feel much much better"  More than 50% reduction of pain     " The pain is only in the wrist now (volar)"  "The numbness is gone"  "I still can't lift a gallon of milk"  8 lbs  Objective: See treatment diary below for clinic program   Refer to RE for objective data  Assessment: Tolerated treatment well;  Pain at rest greatly reduced overall since injection last week  Steady increase of daily function reported  Therapy goals met  FOTO score met  Plan:   May discharge to [facility] for continued strengthening of wrist and putty exercises for hand strength  Precautions:  Diabetic    Access Code: [email or web address]  URL: [email or web address]  Date: 08/10/2022  Prepared by: Michelle Gonzales    Exercises  · Wrist Prayer Stretch at Table - 1 x daily - 7 x weekly - 3 sets - 10 reps  · Seated Wrist Flexion Stretch - 1 x daily - 7 x weekly - 3 sets - 10 reps  · Seated Wrist Extension with Dumbbell - 1 x daily - 7 x weekly - 3 sets - 10 reps  · Seated Wrist Radial Deviation with Dumbbell - 1 x daily - 7 x weekly - 3 sets - 10 reps  · Seated Wrist Flexion with Dumbbell - 1 x daily - 7 x weekly - 3 sets - 10 reps          Date 8/23 8/25 9/6 9/8 9/13  8/10 8/12 8/16 8/18   Visit 11 12 13 14 15  7 8 9 10   Manuals       10' 10' 8' 8'   IASTM             Jt mob       Wrist, thumb  6' Wrist thumb  6' Wrist thumb  4'    Kinesiotape       Volar wrist  4' Volar  Wrist  4' Volar wrist  4' HEP                Neuro Re-Ed                                                                                                        Ther Ex 30'    30'   25'   30'   25'    30'   30'   25'  30'     HEP K taping, lifting, carrying  MNGE  Added to HEP  8'   Review PRE- wrist E/F; splint wear HEP for discharge;  RE  HEP- wrist F, wrist PRE E, RD, F  2x10  15'       W PRE increase to hammer;  Wrist load stretch   PROM 1:1 FA and wrist  6' FA and wrist   6' FA and wrist 6' FA and wrist   6' FA and wrist  6'  FA wrist digits  6' FA wrist digits  6' FA wrist digits  6' FA wrist digits  6'   MNGE    10x10         PROM wrist Prayer  10x10 Prayer  10x10 Prayer 10x10 Prayer   10x10   10x10 10x10 10x10  Prayer, stand   Load  10x10 Prayer   10x10   PRE FA #2x10 #2  2x10 #1  2x10 #1  2x10 mid range    #1  2x10 #1  2x10 #2  2x10   PRE digits     Orange putty issued HEP        PRE wrist E RD F  #1  1x10, each E, RD F  #1  1x10  each  HEP #1  E 2x10  RD 2x10  E, RD  F  #1  1x10  each E, RD  F  #1  2x10 each E, RD, F  #1  2x10  each E RD F  #1  1x10  1 1/2  1x10   PRE FA/wrist G F bar rainbow x10  R F bar  Reverse  P/P  S/S  1x10 each G F bar  [place]  x20  G F bar  Reverse   P/P  S/S  1x10 each Y F bar rainbow  x20 reverse  x20 Y F bar  [place]  x20  Reverse  x20   R F bar rainbow  Reverse  2x10 R F bar  [place]  Reverse  2x10 R F bar  [place]  Reverse  2x10 G F bar rainbowR F bar  Reverse  2x10            P/P  S/S  1x10 P/P  S/S  1x10 each                Ther Activity 10'   10'   HOLD   8'   10'   10' 10'   10'   Gripping #1  5x10 #1  5x10     #1  5 blocks, t/F x10 #1  5 blocks  x10 #1  5 blocks x10 #1  5x10    Pinching    R C pins  Full [place]          isometric Dowel   Into putty  x50 Dowel into putty  x50  Dowel into putty  x50   Dowel into putty  x50 Dowel into putty  x50 Dowel into putty  x50 Dowel into putty   x50   Gait Training                                       Modalities             MHP pre 5' 5' 5' 5' 5'  5' 5' 5' 5'

## 2022-09-15 ENCOUNTER — OFFICE VISIT (OUTPATIENT)
Dept: INTERNAL MEDICINE CLINIC | Facility: CLINIC | Age: 77
End: 2022-09-15
Payer: COMMERCIAL

## 2022-09-15 ENCOUNTER — APPOINTMENT (OUTPATIENT)
Dept: LAB | Facility: HOSPITAL | Age: 77
End: 2022-09-15
Payer: COMMERCIAL

## 2022-09-15 VITALS
BODY MASS INDEX: 30.05 KG/M2 | DIASTOLIC BLOOD PRESSURE: 72 MMHG | WEIGHT: 176 LBS | SYSTOLIC BLOOD PRESSURE: 120 MMHG | OXYGEN SATURATION: 98 % | RESPIRATION RATE: 16 BRPM | HEIGHT: 64 IN | HEART RATE: 64 BPM | TEMPERATURE: 98.6 F

## 2022-09-15 DIAGNOSIS — N18.30 STAGE 3 CHRONIC KIDNEY DISEASE, UNSPECIFIED WHETHER STAGE 3A OR 3B CKD (HCC): ICD-10-CM

## 2022-09-15 DIAGNOSIS — D75.89 MACROCYTOSIS WITHOUT ANEMIA: ICD-10-CM

## 2022-09-15 DIAGNOSIS — I10 PRIMARY HYPERTENSION: ICD-10-CM

## 2022-09-15 DIAGNOSIS — E11.9 TYPE 2 DIABETES MELLITUS WITHOUT COMPLICATION, WITHOUT LONG-TERM CURRENT USE OF INSULIN (HCC): Primary | ICD-10-CM

## 2022-09-15 DIAGNOSIS — E03.9 ACQUIRED HYPOTHYROIDISM: ICD-10-CM

## 2022-09-15 DIAGNOSIS — E11.9 TYPE 2 DIABETES MELLITUS WITHOUT COMPLICATION, WITHOUT LONG-TERM CURRENT USE OF INSULIN (HCC): ICD-10-CM

## 2022-09-15 DIAGNOSIS — M25.532 LEFT WRIST PAIN: ICD-10-CM

## 2022-09-15 DIAGNOSIS — Z78.0 MENOPAUSE: ICD-10-CM

## 2022-09-15 DIAGNOSIS — E78.2 MIXED HYPERLIPIDEMIA: ICD-10-CM

## 2022-09-15 LAB
ALBUMIN SERPL BCP-MCNC: 4.1 G/DL (ref 3.5–5)
ALP SERPL-CCNC: 45 U/L (ref 46–116)
ALT SERPL W P-5'-P-CCNC: 31 U/L (ref 12–78)
ANION GAP SERPL CALCULATED.3IONS-SCNC: 8 MMOL/L (ref 4–13)
AST SERPL W P-5'-P-CCNC: 20 U/L (ref 5–45)
BASOPHILS # BLD AUTO: 0.08 THOUSANDS/ΜL (ref 0–0.1)
BASOPHILS NFR BLD AUTO: 1 % (ref 0–1)
BILIRUB SERPL-MCNC: 0.37 MG/DL (ref 0.2–1)
BUN SERPL-MCNC: 25 MG/DL (ref 5–25)
CALCIUM SERPL-MCNC: 9.1 MG/DL (ref 8.3–10.1)
CHLORIDE SERPL-SCNC: 104 MMOL/L (ref 96–108)
CHOLEST SERPL-MCNC: 117 MG/DL
CO2 SERPL-SCNC: 25 MMOL/L (ref 21–32)
CREAT SERPL-MCNC: 1.04 MG/DL (ref 0.6–1.3)
EOSINOPHIL # BLD AUTO: 0.11 THOUSAND/ΜL (ref 0–0.61)
EOSINOPHIL NFR BLD AUTO: 1 % (ref 0–6)
ERYTHROCYTE [DISTWIDTH] IN BLOOD BY AUTOMATED COUNT: 12.3 % (ref 11.6–15.1)
EST. AVERAGE GLUCOSE BLD GHB EST-MCNC: 163 MG/DL
GFR SERPL CREATININE-BSD FRML MDRD: 51 ML/MIN/1.73SQ M
GLUCOSE P FAST SERPL-MCNC: 135 MG/DL (ref 65–99)
HBA1C MFR BLD: 7.3 %
HCT VFR BLD AUTO: 39.4 % (ref 34.8–46.1)
HDLC SERPL-MCNC: 54 MG/DL
HGB BLD-MCNC: 13 G/DL (ref 11.5–15.4)
IMM GRANULOCYTES # BLD AUTO: 0.02 THOUSAND/UL (ref 0–0.2)
IMM GRANULOCYTES NFR BLD AUTO: 0 % (ref 0–2)
LDLC SERPL CALC-MCNC: 37 MG/DL (ref 0–100)
LYMPHOCYTES # BLD AUTO: 2.11 THOUSANDS/ΜL (ref 0.6–4.47)
LYMPHOCYTES NFR BLD AUTO: 22 % (ref 14–44)
MCH RBC QN AUTO: 32.5 PG (ref 26.8–34.3)
MCHC RBC AUTO-ENTMCNC: 33 G/DL (ref 31.4–37.4)
MCV RBC AUTO: 99 FL (ref 82–98)
MONOCYTES # BLD AUTO: 0.52 THOUSAND/ΜL (ref 0.17–1.22)
MONOCYTES NFR BLD AUTO: 5 % (ref 4–12)
NEUTROPHILS # BLD AUTO: 6.87 THOUSANDS/ΜL (ref 1.85–7.62)
NEUTS SEG NFR BLD AUTO: 71 % (ref 43–75)
NONHDLC SERPL-MCNC: 63 MG/DL
NRBC BLD AUTO-RTO: 0 /100 WBCS
PLATELET # BLD AUTO: 276 THOUSANDS/UL (ref 149–390)
PMV BLD AUTO: 9.5 FL (ref 8.9–12.7)
POTASSIUM SERPL-SCNC: 4.8 MMOL/L (ref 3.5–5.3)
PROT SERPL-MCNC: 7.6 G/DL (ref 6.4–8.4)
RBC # BLD AUTO: 4 MILLION/UL (ref 3.81–5.12)
SODIUM SERPL-SCNC: 137 MMOL/L (ref 135–147)
TRIGL SERPL-MCNC: 128 MG/DL
TSH SERPL DL<=0.05 MIU/L-ACNC: 1.63 UIU/ML (ref 0.45–4.5)
WBC # BLD AUTO: 9.71 THOUSAND/UL (ref 4.31–10.16)

## 2022-09-15 PROCEDURE — 3074F SYST BP LT 130 MM HG: CPT | Performed by: INTERNAL MEDICINE

## 2022-09-15 PROCEDURE — 3078F DIAST BP <80 MM HG: CPT | Performed by: INTERNAL MEDICINE

## 2022-09-15 PROCEDURE — 83036 HEMOGLOBIN GLYCOSYLATED A1C: CPT

## 2022-09-15 PROCEDURE — 1160F RVW MEDS BY RX/DR IN RCRD: CPT | Performed by: INTERNAL MEDICINE

## 2022-09-15 PROCEDURE — 3725F SCREEN DEPRESSION PERFORMED: CPT | Performed by: INTERNAL MEDICINE

## 2022-09-15 PROCEDURE — 80053 COMPREHEN METABOLIC PANEL: CPT

## 2022-09-15 PROCEDURE — 85025 COMPLETE CBC W/AUTO DIFF WBC: CPT

## 2022-09-15 PROCEDURE — 80061 LIPID PANEL: CPT

## 2022-09-15 PROCEDURE — 84443 ASSAY THYROID STIM HORMONE: CPT

## 2022-09-15 PROCEDURE — 99214 OFFICE O/P EST MOD 30 MIN: CPT | Performed by: INTERNAL MEDICINE

## 2022-09-15 PROCEDURE — 36415 COLL VENOUS BLD VENIPUNCTURE: CPT

## 2022-09-15 NOTE — PATIENT INSTRUCTIONS
Lab data reviewed in detail and compared prior    Type 2 diabetes mellitus is under acceptable though not optimal control with A1c 7 3  Patient encouraged to cut back on the sweets and get a little bit more regular exercise  No change medication  Hypertension and hyperlipidemia at goal on current regimen    Hypothyroidism stable on levothyroxine    Depression anxiety remained stable on low-dose Lexapro    Routine follow-up after labs in 6 months, sooner as needed  Health maintenance-bone density due, flu shot mid October, keep your eye out for the data on new COVID booster  Perineural Invasion (For Histology - Be Specific If Possible): absent

## 2022-09-15 NOTE — PROGRESS NOTES
Assessment/Plan:    Diagnoses and all orders for this visit:    Type 2 diabetes mellitus without complication, without long-term current use of insulin (HCC)  -     CBC and differential; Future  -     Comprehensive metabolic panel; Future  -     Lipid panel; Future  -     Hemoglobin A1C; Future  -     TSH, 3rd generation with Free T4 reflex; Future    Acquired hypothyroidism    Primary hypertension    Stage 3 chronic kidney disease, unspecified whether stage 3a or 3b CKD (HCC)    Mixed hyperlipidemia    Left wrist pain    Macrocytosis without anemia    Menopause  -     DXA bone density spine hip and pelvis; Future            Patient Instructions   Lab data reviewed in detail and compared prior    Type 2 diabetes mellitus is under acceptable though not optimal control with A1c 7 3  Patient encouraged to cut back on the sweets and get a little bit more regular exercise  No change medication  Hypertension and hyperlipidemia at goal on current regimen    Hypothyroidism stable on levothyroxine    Depression anxiety remained stable on low-dose Lexapro    Routine follow-up after labs in 6 months, sooner as needed  Health maintenance-bone density due, flu shot mid October, keep your eye out for the data on new COVID booster  Subjective:      Patient ID: Danilo Santiago is a 68 y o  female    F/u mmp and review labs  Feeling generally well  Recovered from L wrist tendonitis, completed OT this week, seeing Dr Dean Soriano next mo  Low back improved, not doing any PT exercises  Using aleve prn  Keeping active, but no formal exercise  HTN/HPL-taking rx as directed, no home bp's  DM- Taking metformin as directed  Not as strict w/ diet  Am sugars ave 140's  Notes diarrhea and fecal urgency, worse in am, ave 1-3 loose bm per day  OAB-stable since surgery, no urgency or incontinence  Hypothyroid taking rx as directed  Depression stable w/ lexapro           Current Outpatient Medications:     acetaminophen (TYLENOL) 325 mg tablet, Take 2 tablets (650 mg total) by mouth every 6 (six) hours as needed for mild pain, Disp: 30 tablet, Rfl: 0    amLODIPine-benazepril (LOTREL 5-10) 5-10 MG per capsule, TAKE 1 CAPSULE BY MOUTH EVERY DAY, Disp: 90 capsule, Rfl: 3    atorvastatin (LIPITOR) 10 mg tablet, TAKE 1 TABLET BY MOUTH EVERY DAY, Disp: 90 tablet, Rfl: 3    escitalopram (LEXAPRO) 5 mg tablet, TAKE 1 TABLET BY MOUTH EVERY DAY, Disp: 90 tablet, Rfl: 1    IBUPROFEN PO, Take by mouth every 6 (six) hours as needed for mild pain, Disp: , Rfl:     Lancets (ONETOUCH DELICA PLUS [id number]) MISC, TEST DAILY, Disp: 100 each, Rfl: 2    latanoprost (XALATAN) 0 005 % ophthalmic solution, INSTILL 1 DROP INTO BOTH EYES AT BEDTIME, Disp: , Rfl:     levothyroxine 100 mcg tablet, TAKE 1 TABLET BY MOUTH EVERY DAY, Disp: 90 tablet, Rfl: 3    metFORMIN (GLUCOPHAGE-XR) 500 mg 24 hr tablet, TAKE 4 TABLETS BY MOUTH EVERY DAY, Disp: 360 tablet, Rfl: 1    Multiple Vitamins-Minerals (CENTRUM SILVER) CHEW, Chew 1 tablet daily, Disp: , Rfl:     ONE TOUCH ULTRA TEST test strip, 1 EACH BY OTHER ROUTE DAILY USE AS INSTRUCTED, Disp: 100 each, Rfl: 2    OneTouch Delica Lancets 89N MISC, TEST 1 DAILY AS INSTRUCTED, Disp: 100 each, Rfl: 2    spironolactone (ALDACTONE) 25 mg tablet, TAKE 1 TABLET BY MOUTH EVERY DAY, Disp: 90 tablet, Rfl: 3    neomycin-colistin-hydrocortisone-thonzonium (CORTISPORIN TC) 0 33-0 3-1-0 05 % otic suspension, Administer 3 drops into both ears 4 (four) times a day (Patient not taking: Reported on 9/15/2022), Disp: 10 mL, Rfl: 0    valACYclovir (VALTREX) 1,000 mg tablet, Take 1 tablet (1,000 mg total) by mouth as needed (as needed), Disp: 14 tablet, Rfl: 0    Recent Results (from the past 1008 hour(s))   CBC and differential    Collection Time: 09/15/22  9:08 AM   Result Value Ref Range    WBC 9 71 4 31 - 10 16 Thousand/uL    RBC 4 00 3 81 - 5 12 Million/uL    Hemoglobin 13 0 11 5 - 15 4 g/dL    Hematocrit 39 4 34 8 - 46 1 %    MCV 99 (H) 82 - 98 fL    MCH 32 5 26 8 - 34 3 pg    MCHC 33 0 31 4 - 37 4 g/dL    RDW 12 3 11 6 - 15 1 %    MPV 9 5 8 9 - 12 7 fL    Platelets 835 927 - 355 Thousands/uL    nRBC 0 /100 WBCs    Neutrophils Relative 71 43 - 75 %    Immat GRANS % 0 0 - 2 %    Lymphocytes Relative 22 14 - 44 %    Monocytes Relative 5 4 - 12 %    Eosinophils Relative 1 0 - 6 %    Basophils Relative 1 0 - 1 %    Neutrophils Absolute 6 87 1 85 - 7 62 Thousands/µL    Immature Grans Absolute 0 02 0 00 - 0 20 Thousand/uL    Lymphocytes Absolute 2 11 0 60 - 4 47 Thousands/µL    Monocytes Absolute 0 52 0 17 - 1 22 Thousand/µL    Eosinophils Absolute 0 11 0 00 - 0 61 Thousand/µL    Basophils Absolute 0 08 0 00 - 0 10 Thousands/µL   Comprehensive metabolic panel    Collection Time: 09/15/22  9:08 AM   Result Value Ref Range    Sodium 137 135 - 147 mmol/L    Potassium 4 8 3 5 - 5 3 mmol/L    Chloride 104 96 - 108 mmol/L    CO2 25 21 - 32 mmol/L    ANION GAP 8 4 - 13 mmol/L    BUN 25 5 - 25 mg/dL    Creatinine 1 04 0 60 - 1 30 mg/dL    Glucose, Fasting 135 (H) 65 - 99 mg/dL    Calcium 9 1 8 3 - 10 1 mg/dL    AST 20 5 - 45 U/L    ALT 31 12 - 78 U/L    Alkaline Phosphatase 45 (L) 46 - 116 U/L    Total Protein 7 6 6 4 - 8 4 g/dL    Albumin 4 1 3 5 - 5 0 g/dL    Total Bilirubin 0 37 0 20 - 1 00 mg/dL    eGFR 51 ml/min/1 73sq m   Lipid panel    Collection Time: 09/15/22  9:08 AM   Result Value Ref Range    Cholesterol 117 See Comment mg/dL    Triglycerides 128 See Comment mg/dL    HDL, Direct 54 >=50 mg/dL    LDL Calculated 37 0 - 100 mg/dL    Non-HDL-Chol (CHOL-HDL) 63 mg/dl   Hemoglobin A1C    Collection Time: 09/15/22  9:08 AM   Result Value Ref Range    Hemoglobin A1C 7 3 (H) Normal 3 8-5 6%; PreDiabetic 5 7-6 4%;  Diabetic >=6 5%; Glycemic control for adults with diabetes <7 0% %     mg/dl   TSH, 3rd generation with Free T4 reflex    Collection Time: 09/15/22  9:08 AM   Result Value Ref Range    TSH 3RD [facility] 1 629 0 450 - 4 500 uIU/mL       The following portions of the patient's history were reviewed and updated as appropriate: allergies, current medications, past family history, past medical history, past social history, past surgical history and problem list      Review of Systems   Constitutional: Negative for appetite change, chills, diaphoresis, fatigue, fever and unexpected weight change  HENT: Negative for congestion, hearing loss and rhinorrhea  Eyes: Negative for visual disturbance  Respiratory: Negative for cough, chest tightness, shortness of breath and wheezing  Cardiovascular: Negative for chest pain, palpitations and leg swelling  Gastrointestinal: Negative for abdominal pain and blood in stool  Endocrine: Negative for cold intolerance, heat intolerance, polydipsia and polyuria  Genitourinary: Negative for difficulty urinating, dysuria, frequency and urgency  Musculoskeletal: Positive for arthralgias  Negative for myalgias  Skin: Negative for rash  Neurological: Negative for dizziness, weakness, light-headedness and headaches  Hematological: Does not bruise/bleed easily  Psychiatric/Behavioral: Negative for dysphoric mood and sleep disturbance  Objective:      Vitals:    09/15/22 1332   BP: 120/72   Pulse: 64   Resp: 16   Temp: 98 6 °F (37 °C)   SpO2: 98%          Physical Exam  Constitutional:       Appearance: She is well-developed  HENT:      Head: Normocephalic and atraumatic  Nose: Nose normal    Eyes:      General: No scleral icterus  Conjunctiva/sclera: Conjunctivae normal       Pupils: Pupils are equal, round, and reactive to light  Neck:      Thyroid: No thyromegaly  Vascular: No JVD  Trachea: No tracheal deviation  Cardiovascular:      Rate and Rhythm: Normal rate and regular rhythm  Heart sounds: No murmur heard  No friction rub  No gallop  Pulmonary:      Effort: Pulmonary effort is normal  No respiratory distress        Breath sounds: Normal breath sounds  No wheezing or rales  Musculoskeletal:         General: No deformity  Cervical back: Normal range of motion and neck supple  Lymphadenopathy:      Cervical: No cervical adenopathy  Skin:     General: Skin is warm and dry  Coloration: Skin is not pale  Findings: No erythema or rash  Neurological:      Mental Status: She is alert and oriented to person, place, and time  Cranial Nerves: No cranial nerve deficit  Psychiatric:         Behavior: Behavior normal          Thought Content:  Thought content normal          Judgment: Judgment normal

## 2022-09-21 RX ORDER — LIDOCAINE HYDROCHLORIDE 10 MG/ML
1 INJECTION, SOLUTION INFILTRATION; PERINEURAL
Status: COMPLETED | OUTPATIENT
Start: 2022-08-31 | End: 2022-08-31

## 2022-09-21 RX ORDER — TRIAMCINOLONE ACETONIDE 40 MG/ML
20 INJECTION, SUSPENSION INTRA-ARTICULAR; INTRAMUSCULAR
Status: COMPLETED | OUTPATIENT
Start: 2022-08-31 | End: 2022-08-31

## 2022-10-30 DIAGNOSIS — F32.9 REACTIVE DEPRESSION: ICD-10-CM

## 2022-10-30 RX ORDER — ESCITALOPRAM OXALATE 5 MG/1
TABLET ORAL
Qty: 90 TABLET | Refills: 1 | Status: SHIPPED | OUTPATIENT
Start: 2022-10-30

## 2022-11-11 ENCOUNTER — OFFICE VISIT (OUTPATIENT)
Dept: OBGYN CLINIC | Facility: CLINIC | Age: 77
End: 2022-11-11

## 2022-11-11 VITALS
HEIGHT: 64 IN | SYSTOLIC BLOOD PRESSURE: 126 MMHG | DIASTOLIC BLOOD PRESSURE: 66 MMHG | RESPIRATION RATE: 18 BRPM | HEART RATE: 78 BPM | WEIGHT: 178 LBS | BODY MASS INDEX: 30.39 KG/M2 | OXYGEN SATURATION: 98 %

## 2022-11-11 DIAGNOSIS — G56.02 CARPAL TUNNEL SYNDROME OF LEFT WRIST: ICD-10-CM

## 2022-11-11 DIAGNOSIS — M19.032 LOCALIZED PRIMARY OSTEOARTHRITIS OF CARPOMETACARPAL (CMC) JOINT OF LEFT WRIST: Primary | ICD-10-CM

## 2022-11-11 NOTE — PROGRESS NOTES
HPI:  Patient is a 68y o  year old RHD female who presents for follow-up of left wrist CMC arthritis and carpal tunnel  Patient was last seen in the office on 8/31/2022 where injection into left carpal tunnel was given and patient was advised to use voltaren gel and continue occupational therapy  Today patient reports significant improvement in symptoms  Patient states she gets an occasional "twinge" in the base of the left thumb, but pain is not constant  Patient denies any numbness or tingling in left hand  Patient completed occupational therapy  Patient wears wrist brace at night as needed for pain       ROS:   General: No fever, no chills, no weight loss, no weight gain  HEENT:  No loss of hearing, no nose bleeds, no sore throat  Eyes:  No eye pain, no red eyes, no visual disturbance  Respiratory:  No cough, no shortness of breath, no wheezing  Cardiovascular:  No chest pain, no palpitations, no edema  GI: No abdominal pain, no nausea, no vomiting  Endocrine: No frequent urination, no excessive thirst  Urinary:  No dysuria, no hematuria, no incontinence  Musculoskeletal: see HPI and PE  Skin:  No rash, no wounds  Neurological:  No dizziness, no headache, no numbness  Psychiatric:  No difficulty concentrating, no depression, no suicide thoughts, no anxiety  Review of all other systems is negative    PMH:  Past Medical History:   Diagnosis Date   • Arthritis    • Colon polyp    • Diabetes mellitus ([address] )    • Disease of thyroid gland    • Elbow fracture    • Foot fracture, left    • GERD (gastroesophageal reflux disease)     occasional   • Hypertension    • Irritable bowel syndrome        PSH:  Past Surgical History:   Procedure Laterality Date   • APPENDECTOMY     • CHOLECYSTECTOMY     • DILATION AND CURETTAGE OF UTERUS  1995   • NC CMBND ANTERPOST COLPORRAPHY W/CYSTO N/A 7/29/2019    Procedure: A&P COLPORRHAPHY; GRAFT;  Surgeon: Alice Hood MD;  [facility];  Service: UroGynecology          • NC CYSTOURETHROSCOPY N/A 7/29/2019    Procedure: CYSTOSCOPY;  Surgeon: Katie Renee MD;  [facility];  Service: UroGynecology          • VA REVAGINAL PROLAPSE,SACROSP LIG N/A 7/29/2019    Procedure: VE COLPOPEXY;  Surgeon: Katie Renee MD;  [facility];  Service: UroGynecology          • VA SLING OPER STRES INCONTINENCE N/A 7/29/2019    Procedure: PUBOVAGINAL SLING;  Surgeon: Katie Renee MD;  [facility];  Service: UroGynecology          • TONSILLECTOMY     • TONSILLECTOMY         Medications:  Current Outpatient Medications   Medication Sig Dispense Refill   • acetaminophen (TYLENOL) 325 mg tablet Take 2 tablets (650 mg total) by mouth every 6 (six) hours as needed for mild pain 30 tablet 0   • amLODIPine-benazepril (LOTREL 5-10) 5-10 MG per capsule TAKE 1 CAPSULE BY MOUTH EVERY DAY 90 capsule 3   • atorvastatin (LIPITOR) 10 mg tablet TAKE 1 TABLET BY MOUTH EVERY DAY 90 tablet 3   • escitalopram (LEXAPRO) 5 mg tablet TAKE 1 TABLET BY MOUTH EVERY DAY 90 tablet 1   • IBUPROFEN PO Take by mouth every 6 (six) hours as needed for mild pain     • Lancets (ONETOUCH DELICA PLUS WBVGDI01C) MISC TEST DAILY 100 each 2   • latanoprost (XALATAN) 0 005 % ophthalmic solution INSTILL 1 DROP INTO BOTH EYES AT BEDTIME     • levothyroxine 100 mcg tablet TAKE 1 TABLET BY MOUTH EVERY DAY 90 tablet 3   • metFORMIN (GLUCOPHAGE-XR) 500 mg 24 hr tablet TAKE 4 TABLETS BY MOUTH EVERY  tablet 1   • Multiple Vitamins-Minerals (CENTRUM SILVER) CHEW Chew 1 tablet daily     • neomycin-colistin-hydrocortisone-thonzonium (CORTISPORIN TC) 0 33-0 3-1-0 05 % otic suspension Administer 3 drops into both ears 4 (four) times a day 10 mL 0   • ONE TOUCH ULTRA TEST test strip 1 EACH BY OTHER ROUTE DAILY USE AS INSTRUCTED 100 each 2   • OneTouch Delica Lancets 04G MISC TEST 1 DAILY AS INSTRUCTED 100 each 2   • spironolactone (ALDACTONE) 25 mg tablet TAKE 1 TABLET BY MOUTH EVERY DAY 90 tablet 3   • valACYclovir (VALTREX) 1,000 mg tablet Take 1 tablet (1,000 mg total) by mouth as needed (as needed) 14 tablet 0     No current facility-administered medications for this visit  Allergies: Allergies   Allergen Reactions   • Penicillins      Was child- reaction unknown       Family History:  Family History   Problem Relation Age of Onset   • Colon cancer Mother         Bowel   • Asthma Mother    • Heart failure Father         Congestive   • Ulcers Father    • Heart attack Maternal Grandfather         Myocardial infarction   • Stomach cancer Paternal Grandmother    • Breast cancer Paternal Aunt    • Ovarian cancer Neg Hx        Social History:  Social History     Occupational History   • Occupation: Retired   Tobacco Use   • Smoking status: Never Smoker   • Smokeless tobacco: Never Used   Vaping Use   • Vaping Use: Never used   Substance and Sexual Activity   • Alcohol use: Yes     Comment: rare, maybe once a year   • Drug use: No   • Sexual activity: Yes     Partners: Male       Physical Exam:  General :  Alert, cooperative, no distress, appears stated age  Blood pressure 126/66, pulse 78, resp  rate 18, height 5' 4" (1 626 m), weight 80 7 kg (178 lb), SpO2 98 %, not currently breastfeeding  Head:  Normocephalic, without obvious abnormality, atraumatic   Eyes:  Conjunctiva/corneas clear, EOM's intact,   Ears: Both ears normal appearance, no hearing deficits  Nose: Nares normal, septum midline, no drainage    Neck: Supple,  trachea midline, no adenopathy, no tenderness, no mass   Back:   Symmetric, no curvature, ROM normal, no tenderness   Lungs:   Respirations unlabored   Chest Wall:  No tenderness or deformity   Extremities: Extremities normal, atraumatic, no cyanosis or edema      Pulses: 2+ and symmetric   Skin: Skin color, texture, turgor normal, no rashes or lesions      Neurologic: Normal           Left Hand Exam     Tenderness   The patient is experiencing no tenderness       Range of Motion   The patient has normal left wrist ROM  Muscle Strength   Wrist extension: 5/5   Wrist flexion: 5/5     Tests   Phalen’s Sign: negative  Tinel's sign (median nerve): negative    Other   Erythema: absent  Scars: absent  Sensation: normal  Pulse: present            Imaging Studies: The following imaging studies were reviewed in office today  My findings are noted  No new imaging today  Assessment  Encounter Diagnoses   Name Primary? • Localized primary osteoarthritis of carpometacarpal (CMC) joint of left wrist Yes   • Carpal tunnel syndrome of left wrist          Plan:  Patient is a 66yo female with  left wrist CMC joint osteoarthritis   *Patient is doing well following injections and occupational therapy  *Continue wearing wrist brace at night as needed  *Follow-up as needed for new or worsening symptoms       Scribe Attestation    I,:  Mari Gonzalez PA-C am acting as a scribe while in the presence of the attending physician :       I,:  Bryn Evans MD personally performed the services described in this documentation    as scribed in my presence :

## 2022-11-17 DIAGNOSIS — E11.9 TYPE 2 DIABETES MELLITUS WITHOUT COMPLICATION, WITHOUT LONG-TERM CURRENT USE OF INSULIN (HCC): ICD-10-CM

## 2022-11-17 RX ORDER — METFORMIN HYDROCHLORIDE 500 MG/1
TABLET, EXTENDED RELEASE ORAL
Qty: 360 TABLET | Refills: 1 | Status: SHIPPED | OUTPATIENT
Start: 2022-11-17

## 2023-01-17 DIAGNOSIS — E78.2 MIXED HYPERLIPIDEMIA: ICD-10-CM

## 2023-01-17 RX ORDER — ATORVASTATIN CALCIUM 10 MG/1
TABLET, FILM COATED ORAL
Qty: 90 TABLET | Refills: 3 | Status: SHIPPED | OUTPATIENT
Start: 2023-01-17

## 2023-03-07 ENCOUNTER — APPOINTMENT (OUTPATIENT)
Dept: LAB | Facility: CLINIC | Age: 78
End: 2023-03-07

## 2023-03-07 ENCOUNTER — OFFICE VISIT (OUTPATIENT)
Dept: INTERNAL MEDICINE CLINIC | Facility: CLINIC | Age: 78
End: 2023-03-07

## 2023-03-07 VITALS
TEMPERATURE: 98 F | OXYGEN SATURATION: 95 % | SYSTOLIC BLOOD PRESSURE: 140 MMHG | BODY MASS INDEX: 29.98 KG/M2 | HEART RATE: 78 BPM | HEIGHT: 64 IN | DIASTOLIC BLOOD PRESSURE: 68 MMHG | RESPIRATION RATE: 16 BRPM | WEIGHT: 175.6 LBS

## 2023-03-07 DIAGNOSIS — H81.11 BENIGN PAROXYSMAL POSITIONAL VERTIGO OF RIGHT EAR: ICD-10-CM

## 2023-03-07 DIAGNOSIS — Z00.00 MEDICARE ANNUAL WELLNESS VISIT, SUBSEQUENT: Primary | ICD-10-CM

## 2023-03-07 DIAGNOSIS — I10 ESSENTIAL HYPERTENSION: ICD-10-CM

## 2023-03-07 DIAGNOSIS — E11.9 TYPE 2 DIABETES MELLITUS WITHOUT COMPLICATION, WITHOUT LONG-TERM CURRENT USE OF INSULIN (HCC): ICD-10-CM

## 2023-03-07 DIAGNOSIS — F32.9 REACTIVE DEPRESSION: ICD-10-CM

## 2023-03-07 DIAGNOSIS — I10 PRIMARY HYPERTENSION: ICD-10-CM

## 2023-03-07 DIAGNOSIS — E78.2 MIXED HYPERLIPIDEMIA: ICD-10-CM

## 2023-03-07 DIAGNOSIS — Z12.31 ENCOUNTER FOR SCREENING MAMMOGRAM FOR MALIGNANT NEOPLASM OF BREAST: ICD-10-CM

## 2023-03-07 DIAGNOSIS — E03.9 ACQUIRED HYPOTHYROIDISM: ICD-10-CM

## 2023-03-07 DIAGNOSIS — N18.30 STAGE 3 CHRONIC KIDNEY DISEASE, UNSPECIFIED WHETHER STAGE 3A OR 3B CKD (HCC): ICD-10-CM

## 2023-03-07 DIAGNOSIS — R06.09 EXERTIONAL DYSPNEA: ICD-10-CM

## 2023-03-07 LAB
ALBUMIN SERPL BCP-MCNC: 4 G/DL (ref 3.5–5)
ALP SERPL-CCNC: 45 U/L (ref 46–116)
ALT SERPL W P-5'-P-CCNC: 35 U/L (ref 12–78)
ANION GAP SERPL CALCULATED.3IONS-SCNC: 3 MMOL/L (ref 4–13)
AST SERPL W P-5'-P-CCNC: 24 U/L (ref 5–45)
BASOPHILS # BLD AUTO: 0.08 THOUSANDS/ÂΜL (ref 0–0.1)
BASOPHILS NFR BLD AUTO: 1 % (ref 0–1)
BILIRUB SERPL-MCNC: 0.19 MG/DL (ref 0.2–1)
BUN SERPL-MCNC: 22 MG/DL (ref 5–25)
CALCIUM SERPL-MCNC: 9.3 MG/DL (ref 8.3–10.1)
CHLORIDE SERPL-SCNC: 107 MMOL/L (ref 96–108)
CHOLEST SERPL-MCNC: 114 MG/DL
CO2 SERPL-SCNC: 27 MMOL/L (ref 21–32)
CREAT SERPL-MCNC: 1.07 MG/DL (ref 0.6–1.3)
EOSINOPHIL # BLD AUTO: 0.16 THOUSAND/ÂΜL (ref 0–0.61)
EOSINOPHIL NFR BLD AUTO: 2 % (ref 0–6)
ERYTHROCYTE [DISTWIDTH] IN BLOOD BY AUTOMATED COUNT: 12.7 % (ref 11.6–15.1)
GFR SERPL CREATININE-BSD FRML MDRD: 49 ML/MIN/1.73SQ M
GLUCOSE P FAST SERPL-MCNC: 183 MG/DL (ref 65–99)
HCT VFR BLD AUTO: 41 % (ref 34.8–46.1)
HDLC SERPL-MCNC: 43 MG/DL
HGB BLD-MCNC: 13.1 G/DL (ref 11.5–15.4)
IMM GRANULOCYTES # BLD AUTO: 0.02 THOUSAND/UL (ref 0–0.2)
IMM GRANULOCYTES NFR BLD AUTO: 0 % (ref 0–2)
LDLC SERPL CALC-MCNC: 35 MG/DL (ref 0–100)
LYMPHOCYTES # BLD AUTO: 2.15 THOUSANDS/ÂΜL (ref 0.6–4.47)
LYMPHOCYTES NFR BLD AUTO: 26 % (ref 14–44)
MCH RBC QN AUTO: 31.6 PG (ref 26.8–34.3)
MCHC RBC AUTO-ENTMCNC: 32 G/DL (ref 31.4–37.4)
MCV RBC AUTO: 99 FL (ref 82–98)
MONOCYTES # BLD AUTO: 0.6 THOUSAND/ÂΜL (ref 0.17–1.22)
MONOCYTES NFR BLD AUTO: 7 % (ref 4–12)
NEUTROPHILS # BLD AUTO: 5.37 THOUSANDS/ÂΜL (ref 1.85–7.62)
NEUTS SEG NFR BLD AUTO: 64 % (ref 43–75)
NONHDLC SERPL-MCNC: 71 MG/DL
NRBC BLD AUTO-RTO: 0 /100 WBCS
PLATELET # BLD AUTO: 278 THOUSANDS/UL (ref 149–390)
PMV BLD AUTO: 10.1 FL (ref 8.9–12.7)
POTASSIUM SERPL-SCNC: 4.5 MMOL/L (ref 3.5–5.3)
PROT SERPL-MCNC: 7.6 G/DL (ref 6.4–8.4)
RBC # BLD AUTO: 4.14 MILLION/UL (ref 3.81–5.12)
SODIUM SERPL-SCNC: 137 MMOL/L (ref 135–147)
TRIGL SERPL-MCNC: 180 MG/DL
TSH SERPL DL<=0.05 MIU/L-ACNC: 1.26 UIU/ML (ref 0.45–4.5)
WBC # BLD AUTO: 8.38 THOUSAND/UL (ref 4.31–10.16)

## 2023-03-07 RX ORDER — AMLODIPINE BESYLATE AND BENAZEPRIL HYDROCHLORIDE 5; 10 MG/1; MG/1
1 CAPSULE ORAL DAILY
Qty: 90 CAPSULE | Refills: 3 | Status: SHIPPED | OUTPATIENT
Start: 2023-03-07

## 2023-03-07 RX ORDER — ESCITALOPRAM OXALATE 10 MG/1
5 TABLET ORAL DAILY
Qty: 90 TABLET | Refills: 3 | Status: SHIPPED | OUTPATIENT
Start: 2023-03-07

## 2023-03-07 NOTE — PATIENT INSTRUCTIONS
Check labs, will follow accordingly    Exertional dyspnea-check stress echo    Health maintenance-schedule mammogram and bone density when you can    Depression with anxiety-increase Lexapro to 10 mg, follow-up 1 month        Medicare Preventive Visit Patient Instructions  Thank you for completing your Welcome to Medicare Visit or Medicare Annual Wellness Visit today  Your next wellness visit will be due in one year (3/7/2024)  The screening/preventive services that you may require over the next 5-10 years are detailed below  Some tests may not apply to you based off risk factors and/or age  Screening tests ordered at today's visit but not completed yet may show as past due  Also, please note that scanned in results may not display below  Preventive Screenings:  Service Recommendations Previous Testing/Comments   Colorectal Cancer Screening  * Colonoscopy    * Fecal Occult Blood Test (FOBT)/Fecal Immunochemical Test (FIT)  * Fecal DNA/Cologuard Test  * Flexible Sigmoidoscopy Age: 39-70 years old   Colonoscopy: every 10 years (may be performed more frequently if at higher risk)  OR  FOBT/FIT: every 1 year  OR  Cologuard: every 3 years  OR  Sigmoidoscopy: every 5 years  Screening may be recommended earlier than age 39 if at higher risk for colorectal cancer  Also, an individualized decision between you and your healthcare provider will decide whether screening between the ages of 74-80 would be appropriate  Colonoscopy: 06/18/2019  FOBT/FIT: Not on file  Cologuard: Not on file  Sigmoidoscopy: Not on file    Screening Current     Breast Cancer Screening Age: 36 years old  Frequency: every 1-2 years  Not required if history of left and right mastectomy Mammogram: 10/24/2018        Cervical Cancer Screening Between the ages of 21-29, pap smear recommended once every 3 years  Between the ages of 33-67, can perform pap smear with HPV co-testing every 5 years     Recommendations may differ for women with a history of total hysterectomy, cervical cancer, or abnormal pap smears in past  Pap Smear: Not on file    Screening Not Indicated   Hepatitis C Screening Once for adults born between 1945 and 1965  More frequently in patients at high risk for Hepatitis C Hep C Antibody: 12/14/2020    Screening Current   Diabetes Screening 1-2 times per year if you're at risk for diabetes or have pre-diabetes Fasting glucose: 135 mg/dL (9/15/2022)  A1C: 7 3 % (9/15/2022)  Screening Not Indicated  History Diabetes   Cholesterol Screening Once every 5 years if you don't have a lipid disorder  May order more often based on risk factors  Lipid panel: 09/15/2022    Screening Not Indicated  History Lipid Disorder     Other Preventive Screenings Covered by Medicare:  Abdominal Aortic Aneurysm (AAA) Screening: covered once if your at risk  You're considered to be at risk if you have a family history of AAA  Lung Cancer Screening: covers low dose CT scan once per year if you meet all of the following conditions: (1) Age 50-69; (2) No signs or symptoms of lung cancer; (3) Current smoker or have quit smoking within the last 15 years; (4) You have a tobacco smoking history of at least 20 pack years (packs per day multiplied by number of years you smoked); (5) You get a written order from a healthcare provider  Glaucoma Screening: covered annually if you're considered high risk: (1) You have diabetes OR (2) Family history of glaucoma OR (3)  aged 48 and older OR (3)  American aged 72 and older  Osteoporosis Screening: covered every 2 years if you meet one of the following conditions: (1) You're estrogen deficient and at risk for osteoporosis based off medical history and other findings; (2) Have a vertebral abnormality; (3) On glucocorticoid therapy for more than 3 months; (4) Have primary hyperparathyroidism; (5) On osteoporosis medications and need to assess response to drug therapy     Last bone density test (DXA Scan): 08/07/2019  HIV Screening: covered annually if you're between the age of 12-76  Also covered annually if you are younger than 13 and older than 72 with risk factors for HIV infection  For pregnant patients, it is covered up to 3 times per pregnancy  Immunizations:  Immunization Recommendations   Influenza Vaccine Annual influenza vaccination during flu season is recommended for all persons aged >= 6 months who do not have contraindications   Pneumococcal Vaccine   * Pneumococcal conjugate vaccine = PCV13 (Prevnar 13), PCV15 (Vaxneuvance), PCV20 (Prevnar 20)  * Pneumococcal polysaccharide vaccine = PPSV23 (Pneumovax) Adults 25-60 years old: 1-3 doses may be recommended based on certain risk factors  Adults 72 years old: 1-2 doses may be recommended based off what pneumonia vaccine you previously received   Hepatitis B Vaccine 3 dose series if at intermediate or high risk (ex: diabetes, end stage renal disease, liver disease)   Tetanus (Td) Vaccine - COST NOT COVERED BY MEDICARE PART B Following completion of primary series, a booster dose should be given every 10 years to maintain immunity against tetanus  Td may also be given as tetanus wound prophylaxis  Tdap Vaccine - COST NOT COVERED BY MEDICARE PART B Recommended at least once for all adults  For pregnant patients, recommended with each pregnancy  Shingles Vaccine (Shingrix) - COST NOT COVERED BY MEDICARE PART B  2 shot series recommended in those aged 48 and above     Health Maintenance Due:      Topic Date Due    DXA SCAN  08/07/2021    Colorectal Cancer Screening  06/18/2024    Hepatitis C Screening  Completed     Immunizations Due:      Topic Date Due    COVID-19 Vaccine (4 - Booster for Moderna series) 01/05/2022    Influenza Vaccine (1) 09/01/2022     Advance Directives   What are advance directives? Advance directives are legal documents that state your wishes and plans for medical care   These plans are made ahead of time in case you lose your ability to make decisions for yourself  Advance directives can apply to any medical decision, such as the treatments you want, and if you want to donate organs  What are the types of advance directives? There are many types of advance directives, and each state has rules about how to use them  You may choose a combination of any of the following:  Living will: This is a written record of the treatment you want  You can also choose which treatments you do not want, which to limit, and which to stop at a certain time  This includes surgery, medicine, IV fluid, and tube feedings  Durable power of  for healthcare [facility]): This is a written record that states who you want to make healthcare choices for you when you are unable to make them for yourself  This person, called a proxy, is usually a family member or a friend  You may choose more than 1 proxy  Do not resuscitate (DNR) order:  A DNR order is used in case your heart stops beating or you stop breathing  It is a request not to have certain forms of treatment, such as CPR  A DNR order may be included in other types of advance directives  Medical directive: This covers the care that you want if you are in a coma, near death, or unable to make decisions for yourself  You can list the treatments you want for each condition  Treatment may include pain medicine, surgery, blood transfusions, dialysis, IV or tube feedings, and a ventilator (breathing machine)  Values history: This document has questions about your views, beliefs, and how you feel and think about life  This information can help others choose the care that you would choose  Why are advance directives important? An advance directive helps you control your care  Although spoken wishes may be used, it is better to have your wishes written down  Spoken wishes can be misunderstood, or not followed  Treatments may be given even if you do not want them   An advance directive may make it easier for your family to make difficult choices about your care  Fall Prevention    Fall prevention  includes ways to make your home and other areas safer  It also includes ways you can move more carefully to prevent a fall  Health conditions that cause changes in your blood pressure, vision, or muscle strength and coordination may increase your risk for falls  Medicines may also increase your risk for falls if they make you dizzy, weak, or sleepy  Fall prevention tips:   Stand or sit up slowly  Use assistive devices as directed  Wear shoes that fit well and have soles that   Wear a personal alarm  Stay active  Manage your medical conditions  Home Safety Tips:  Add items to prevent falls in the bathroom  Keep paths clear  Install bright lights in your home  Keep items you use often on shelves within reach  [employer] or place reflective tape on the edges of your stairs  Urinary Incontinence   Urinary incontinence (UI)  is when you lose control of your bladder  UI develops because your bladder cannot store or empty urine properly  The 3 most common types of UI are stress incontinence, urge incontinence, or both  Medicines:   May be given to help strengthen your bladder control  Report any side effects of medication to your healthcare provider  Do pelvic muscle exercises often:  Your pelvic muscles help you stop urinating  Squeeze these muscles tight for 5 seconds, then relax for 5 seconds  Gradually work up to squeezing for 10 seconds  Do 3 sets of 15 repetitions a day, or as directed  This will help strengthen your pelvic muscles and improve bladder control  Train your bladder:  Go to the bathroom at set times, such as every 2 hours, even if you do not feel the urge to go  You can also try to hold your urine when you feel the urge to go  For example, hold your urine for 5 minutes when you feel the urge to go  As that becomes easier, hold your urine for 10 minutes     Self-care:   Keep a UI record  Write down how often you leak urine and how much you leak  Make a note of what you were doing when you leaked urine  Drink liquids as directed  You may need to limit the amount of liquid you drink to help control your urine leakage  Do not drink any liquid right before you go to bed  Limit or do not have drinks that contain caffeine or alcohol  Prevent constipation  Eat a variety of high-fiber foods  Good examples are high-fiber cereals, beans, vegetables, and whole-grain breads  Walking is the best way to trigger your intestines to have a bowel movement  Exercise regularly and maintain a healthy weight  Weight loss and exercise will decrease pressure on your bladder and help you control your leakage  Use a catheter as directed  to help empty your bladder  A catheter is a tiny, plastic tube that is put into your bladder to drain your urine  Go to behavior therapy as directed  Behavior therapy may be used to help you learn to control your urge to urinate  Weight Management   Why it is important to manage your weight:  Being overweight increases your risk of health conditions such as heart disease, high blood pressure, type 2 diabetes, and certain types of cancer  It can also increase your risk for osteoarthritis, sleep apnea, and other respiratory problems  Aim for a slow, steady weight loss  Even a small amount of weight loss can lower your risk of health problems  How to lose weight safely:  A safe and healthy way to lose weight is to eat fewer calories and get regular exercise  You can lose up about 1 pound a week by decreasing the number of calories you eat by 500 calories each day  Healthy meal plan for weight management:  A healthy meal plan includes a variety of foods, contains fewer calories, and helps you stay healthy  A healthy meal plan includes the following:  Eat whole-grain foods more often  A healthy meal plan should contain fiber   Fiber is the part of grains, fruits, and vegetables that is not broken down by your body  Whole-grain foods are healthy and provide extra fiber in your diet  Some examples of whole-grain foods are whole-wheat breads and pastas, oatmeal, brown rice, and bulgur  Eat a variety of vegetables every day  Include dark, leafy greens such as spinach, kale, luis greens, and mustard greens  Eat yellow and orange vegetables such as carrots, sweet potatoes, and winter squash  Eat a variety of fruits every day  Choose fresh or canned fruit (canned in its own juice or light syrup) instead of juice  Fruit juice has very little or no fiber  Eat low-fat dairy foods  Drink fat-free (skim) milk or 1% milk  Eat fat-free yogurt and low-fat cottage cheese  Try low-fat cheeses such as mozzarella and other reduced-fat cheeses  Choose meat and other protein foods that are low in fat  Choose beans or other legumes such as split peas or lentils  Choose fish, skinless poultry (chicken or turkey), or lean cuts of red meat (beef or pork)  Before you cook meat or poultry, cut off any visible fat  Use less fat and oil  Try baking foods instead of frying them  Add less fat, such as margarine, sour cream, regular salad dressing and mayonnaise to foods  Eat fewer high-fat foods  Some examples of high-fat foods include french fries, doughnuts, ice cream, and cakes  Eat fewer sweets  Limit foods and drinks that are high in sugar  This includes candy, cookies, regular soda, and sweetened drinks  Exercise:  Exercise at least 30 minutes per day on most days of the week  Some examples of exercise include walking, biking, dancing, and swimming  You can also fit in more physical activity by taking the stairs instead of the elevator or parking farther away from stores  Ask your healthcare provider about the best exercise plan for you        © Copyright [employer] 2018 Information is for End User's use only and may not be sold, redistributed or otherwise used for commercial purposes   All illustrations and images included in CareNotes® are the copyrighted property of A [employer]  or Nicholas Cornell

## 2023-03-07 NOTE — PROGRESS NOTES
Assessment and Plan:     Problem List Items Addressed This Visit        Endocrine    Diabetes ([facility] )    Hypothyroidism       Cardiovascular and Mediastinum    Hypertension    Relevant Medications    amLODIPine-benazepril (LOTREL 5-10) 5-10 MG per capsule       Nervous and Auditory    Benign paroxysmal positional vertigo of right ear       Genitourinary    CKD (chronic kidney disease) stage 3, GFR 30-59 ml/min ([facility])       Other    Hyperlipidemia    Reactive depression    Relevant Medications    escitalopram (LEXAPRO) 10 mg tablet   Other Visit Diagnoses     Medicare annual wellness visit, subsequent    -  Primary    Essential hypertension        Relevant Medications    amLODIPine-benazepril (LOTREL 5-10) 5-10 MG per capsule    Exertional dyspnea        Relevant Orders    Echo stress test, exercise    Encounter for screening mammogram for malignant neoplasm of breast        Relevant Orders    Mammo screening bilateral w 3d & cad           Preventive health issues were discussed with patient, and age appropriate screening tests were ordered as noted in patient's After Visit Summary  Personalized health advice and appropriate referrals for health education or preventive services given if needed, as noted in patient's After Visit Summary  History of Present Illness:     Patient presents for a Medicare Wellness Visit    F/u mmp, awv, but didn't do labs  Feeling generally well, but very stressed w/ Lieutenant Cartagnea recent decline  Notes arredondo walking up stairs or doing chores, but no cp  Keeping active, but no formal exercise  HTN/HPL-taking rx as directed, no home bp's  DM- Taking metformin as directed  Not as strict w/ diet  Am sugars ave 140's  OAB-stable since surgery, no urgency or incontinence  Hypothyroid taking rx as directed  Depression-increased anxiety and poor sleep r/t Lieutenant Cartagena behaviors         Patient Care Team:  Yanna Schultz MD as PCP - General  Jamie Alcazar MD     Review of Systems: Review of Systems     Problem List:     Patient Active Problem List   Diagnosis   • Diabetes ([facility] [address] )   • Herpes simplex infection   • Hyperlipidemia   • Hypertension   • Hypothyroidism   • Lumbar radiculopathy   • Spinal arachnoid cyst   • Vitamin D deficiency   • Cystocele with uterine prolapse   • Benign paroxysmal positional vertigo of right ear   • Reactive depression   • CKD (chronic kidney disease) stage 3, GFR 30-59 ml/min ([facility])   • Macrocytosis without anemia   • Otitis externa of right ear   • Left wrist pain      Past Medical and Surgical History:     Past Medical History:   Diagnosis Date   • Arthritis    • Colon polyp    • Diabetes mellitus ([facility] [address] )    • Disease of thyroid gland    • Elbow fracture    • Foot fracture, left    • GERD (gastroesophageal reflux disease)     occasional   • Hypertension    • Irritable bowel syndrome      Past Surgical History:   Procedure Laterality Date   • APPENDECTOMY     • CHOLECYSTECTOMY     • DILATION AND CURETTAGE OF UTERUS  1995   • AR CMBND ANTERPOST COLPORRAPHY W/CYSTO N/A 7/29/2019    Procedure: A&P COLPORRHAPHY; GRAFT;  Surgeon: Casey Del Rosario MD;  [facility]: [facility];  Service: UroGynecology          • AR COLPOPEXY VAGINAL EXTRAPERITONEAL APPROACH N/A 7/29/2019    Procedure: VE COLPOPEXY;  Surgeon: Casey Del Rosario MD;  [facility];  Service: UroGynecology          • AR CYSTOURETHROSCOPY N/A 7/29/2019    Procedure: CYSTOSCOPY;  Surgeon: Casey Del Rosario MD;  [facility];  Service: UroGynecology          • AR SLING OPERATION STRESS INCONTINENCE N/A 7/29/2019    Procedure: PUBOVAGINAL SLING;  Surgeon: Casey Del Rosario MD;  [facility];  Service: UroGynecology          • TONSILLECTOMY     • TONSILLECTOMY        Family History:     Family History   Problem Relation Age of Onset   • Colon cancer Mother         Bowel   • Asthma Mother    • Heart failure Father         Congestive   • Ulcers Father    • Heart attack Maternal Grandfather Myocardial infarction   • Stomach cancer Paternal Grandmother    • Breast cancer Paternal Aunt    • Ovarian cancer Neg Hx       Social History:     Social History     Socioeconomic History   • Marital status: /Civil Union     Spouse name: None   • Number of children: 2   • Years of education: High school graduate   • Highest education level: None   Occupational History   • Occupation: Retired   Tobacco Use   • Smoking status: Never   • Smokeless tobacco: Never   Vaping Use   • Vaping Use: Never used   Substance and Sexual Activity   • Alcohol use: Yes     Comment: rare, maybe once a year   • Drug use: No   • Sexual activity: Yes     Partners: Male   Other Topics Concern   • None   Social History Narrative    Active Advance Directive on File    Daily caffeine Consumption 1 serving a day    Exercise habits    Lives with spouse     Social Determinants of Health     Financial Resource Strain: Low Risk    • Difficulty of Paying Living Expenses: Not hard at all   Food Insecurity: Not on file   Transportation Needs: No Transportation Needs   • Lack of Transportation (Medical): No   • Lack of Transportation (Non-Medical):  No   Physical Activity: Inactive   • Days of Exercise per Week: 0 days   • Minutes of Exercise per Session: 0 min   Stress: Stress Concern Present   • Feeling of Stress : Rather much   Social Connections: Not on file   Intimate Partner Violence: Not on file   Housing Stability: Not on file      Medications and Allergies:     Current Outpatient Medications   Medication Sig Dispense Refill   • acetaminophen (TYLENOL) 325 mg tablet Take 2 tablets (650 mg total) by mouth every 6 (six) hours as needed for mild pain 30 tablet 0   • amLODIPine-benazepril (LOTREL 5-10) 5-10 MG per capsule Take 1 capsule by mouth daily 90 capsule 3   • atorvastatin (LIPITOR) 10 mg tablet TAKE 1 TABLET BY MOUTH EVERY DAY 90 tablet 3   • escitalopram (LEXAPRO) 10 mg tablet Take 0 5 tablets (5 mg total) by mouth daily 90 tablet 3   • IBUPROFEN PO Take by mouth every 6 (six) hours as needed for mild pain     • Lancets (ONETOUCH DELICA PLUS [id number]) MISC TEST DAILY 100 each 2   • latanoprost (XALATAN) 0 005 % ophthalmic solution INSTILL 1 DROP INTO BOTH EYES AT BEDTIME     • levothyroxine 100 mcg tablet TAKE 1 TABLET BY MOUTH EVERY DAY 90 tablet 3   • metFORMIN (GLUCOPHAGE-XR) 500 mg 24 hr tablet TAKE 4 TABLETS BY MOUTH EVERY  tablet 1   • Multiple Vitamins-Minerals (CENTRUM SILVER) CHEW Chew 1 tablet daily     • ONE TOUCH ULTRA TEST test strip 1 EACH BY OTHER ROUTE DAILY USE AS INSTRUCTED 100 each 2   • OneTouch Delica Lancets 12Z MISC TEST 1 DAILY AS INSTRUCTED 100 each 2   • spironolactone (ALDACTONE) 25 mg tablet TAKE 1 TABLET BY MOUTH EVERY DAY 90 tablet 3   • neomycin-colistin-hydrocortisone-thonzonium (CORTISPORIN TC) 0 33-0 3-1-0 05 % otic suspension Administer 3 drops into both ears 4 (four) times a day 10 mL 0   • valACYclovir (VALTREX) 1,000 mg tablet Take 1 tablet (1,000 mg total) by mouth as needed (as needed) 14 tablet 0     No current facility-administered medications for this visit       Allergies   Allergen Reactions   • Penicillins      Was child- reaction unknown      Immunizations:     Immunization History   Administered Date(s) Administered   • COVID-19 MODERNA VACC 0 25 ML IM BOOSTER 11/10/2021   • COVID-19 MODERNA VACC 0 5 ML IM 02/08/2021, 03/08/2021   • Influenza, seasonal, injectable 08/31/2015, 01/13/2017   • Pneumococcal Conjugate 13-Valent 10/07/2018   • Pneumococcal Polysaccharide PPV23 08/31/2015   • Tdap 04/23/2010   • Zoster 1945   • Zoster Vaccine Recombinant 09/23/2020, 09/23/2020, 11/24/2020, 11/24/2020      Health Maintenance:         Topic Date Due   • DXA SCAN  08/07/2021   • Colorectal Cancer Screening  06/18/2024   • Hepatitis C Screening  Completed         Topic Date Due   • COVID-19 Vaccine (4 - Booster for Moderna series) 01/05/2022   • Influenza Vaccine (1) 09/01/2022 Medicare Screening Tests and Risk Assessments:     oJsseline Epperson is here for her Subsequent Wellness visit  Health Risk Assessment:   Patient rates overall health as good  Patient feels that their physical health rating is slightly worse  Patient is satisfied with their life  Eyesight was rated as same  Hearing was rated as same  Patient feels that their emotional and mental health rating is slightly worse  Patients states they are never, rarely angry  Patient states they are often unusually tired/fatigued  Pain experienced in the last 7 days has been none  Patient states that she has experienced no weight loss or gain in last 6 months  Depression Screening:   PHQ-9 Score: 2      Fall Risk Screening: In the past year, patient has experienced: history of falling in past year    Number of falls: 2 or more  Injured during fall?: No    Feels unsteady when standing or walking?: No    Worried about falling?: No      Urinary Incontinence Screening:   Patient has leaked urine accidently in the last six months  Home Safety:  Patient does not have trouble with stairs inside or outside of their home  Patient has working smoke alarms and has working carbon monoxide detector  Home safety hazards include: none  Nutrition:   Current diet is Regular  Medications:   Patient is currently taking over-the-counter supplements  OTC medications include: see medication list  Patient is able to manage medications  Activities of Daily Living (ADLs)/Instrumental Activities of Daily Living (IADLs):   Walk and transfer into and out of bed and chair?: Yes  Dress and groom yourself?: Yes    Bathe or shower yourself?: Yes    Feed yourself?  Yes  Do your laundry/housekeeping?: Yes  Manage your money, pay your bills and track your expenses?: Yes  Make your own meals?: Yes    Do your own shopping?: Yes    Previous Hospitalizations:   Any hospitalizations or ED visits within the last 12 months?: Yes    How many hospitalizations have you had in the last year?: 1-2    Advance Care Planning:   Living will: Yes    Advanced directive: Yes      Cognitive Screening:   Provider or family/friend/caregiver concerned regarding cognition?: No    PREVENTIVE SCREENINGS      Cardiovascular Screening:    General: Screening Not Indicated and History Lipid Disorder      Diabetes Screening:     General: Screening Not Indicated and History Diabetes      Colorectal Cancer Screening:     General: Screening Current      Breast Cancer Screening:     General: Risks and Benefits Discussed      Cervical Cancer Screening:    General: Screening Not Indicated      Osteoporosis Screening:    General: Risks and Benefits Discussed      Abdominal Aortic Aneurysm (AAA) Screening:        General: Screening Not Indicated      Lung Cancer Screening:     General: Screening Not Indicated      Hepatitis C Screening:    General: Screening Current    Screening, Brief Intervention, and Referral to Treatment (SBIRT)    Screening      Single Item Drug Screening:  How often have you used an illegal drug (including marijuana) or a prescription medication for non-medical reasons in the past year? never    Single Item Drug Screen Score: 0  Interpretation: Negative screen for possible drug use disorder    No results found  Physical Exam:     /68 (BP Location: Left arm, Patient Position: Sitting, Cuff Size: Standard)   Pulse 78   Temp 98 °F (36 7 °C) (Tympanic)   Resp 16   Ht 5' 4" (1 626 m)   Wt 79 7 kg (175 lb 9 6 oz)   SpO2 95%   BMI 30 14 kg/m²     Physical Exam  Vitals and nursing note reviewed  Constitutional:       General: She is not in acute distress  Appearance: She is well-developed  HENT:      Head: Normocephalic and atraumatic  Eyes:      Conjunctiva/sclera: Conjunctivae normal    Cardiovascular:      Rate and Rhythm: Normal rate and regular rhythm  Heart sounds: No murmur heard    Pulmonary:      Effort: Pulmonary effort is normal  No respiratory distress  Breath sounds: Normal breath sounds  Abdominal:      Palpations: Abdomen is soft  Tenderness: There is no abdominal tenderness  Musculoskeletal:         General: No swelling  Cervical back: Neck supple  Skin:     General: Skin is warm and dry  Capillary Refill: Capillary refill takes less than 2 seconds  Neurological:      Mental Status: She is alert     Psychiatric:         Mood and Affect: Mood normal           Donovan Patricio MD

## 2023-03-08 LAB
EST. AVERAGE GLUCOSE BLD GHB EST-MCNC: 169 MG/DL
HBA1C MFR BLD: 7.5 %

## 2023-04-18 ENCOUNTER — TELEPHONE (OUTPATIENT)
Age: 78
End: 2023-04-18

## 2023-04-18 NOTE — TELEPHONE ENCOUNTER
Form for FMLA with [employer] will be put in Dr Galeana's office today      Parent: Billy Mills  Employee: César Gibson

## 2023-05-02 ENCOUNTER — OFFICE VISIT (OUTPATIENT)
Age: 78
End: 2023-05-02

## 2023-05-02 VITALS
WEIGHT: 178 LBS | DIASTOLIC BLOOD PRESSURE: 80 MMHG | SYSTOLIC BLOOD PRESSURE: 132 MMHG | HEIGHT: 64 IN | BODY MASS INDEX: 30.39 KG/M2 | HEART RATE: 80 BPM | RESPIRATION RATE: 16 BRPM

## 2023-05-02 DIAGNOSIS — E11.9 TYPE 2 DIABETES MELLITUS WITHOUT COMPLICATION, WITHOUT LONG-TERM CURRENT USE OF INSULIN (HCC): ICD-10-CM

## 2023-05-02 DIAGNOSIS — F32.9 REACTIVE DEPRESSION: ICD-10-CM

## 2023-05-02 DIAGNOSIS — B00.9 HERPES SIMPLEX INFECTION: ICD-10-CM

## 2023-05-02 DIAGNOSIS — N18.30 STAGE 3 CHRONIC KIDNEY DISEASE, UNSPECIFIED WHETHER STAGE 3A OR 3B CKD (HCC): ICD-10-CM

## 2023-05-02 DIAGNOSIS — E78.2 MIXED HYPERLIPIDEMIA: ICD-10-CM

## 2023-05-02 DIAGNOSIS — I10 PRIMARY HYPERTENSION: ICD-10-CM

## 2023-05-02 DIAGNOSIS — E03.9 ACQUIRED HYPOTHYROIDISM: Primary | ICD-10-CM

## 2023-05-02 DIAGNOSIS — E55.9 VITAMIN D DEFICIENCY: ICD-10-CM

## 2023-05-02 RX ORDER — VALACYCLOVIR HYDROCHLORIDE 1 G/1
1000 TABLET, FILM COATED ORAL AS NEEDED
Qty: 14 TABLET | Refills: 0 | Status: SHIPPED | OUTPATIENT
Start: 2023-05-02 | End: 2023-08-29

## 2023-05-02 NOTE — ASSESSMENT & PLAN NOTE
Lab Results   Component Value Date    EGFR 49 03/07/2023    EGFR 51 09/15/2022    EGFR 49 03/04/2022    CREATININE 1 07 03/07/2023    CREATININE 1 04 09/15/2022    CREATININE 1 09 03/04/2022     At baseline  We will monitor periodically

## 2023-05-02 NOTE — ASSESSMENT & PLAN NOTE
Lab Results   Component Value Date    [id number] 1 260 03/07/2023    FREET4 1 37 05/09/2019     On levothyroxine

## 2023-05-02 NOTE — ASSESSMENT & PLAN NOTE
Lab Results   Component Value Date    CHOLESTEROL 114 03/07/2023    TRIG 180 (H) 03/07/2023    HDL 43 (L) 03/07/2023    LDLCALC 35 03/07/2023     On atorvastatin 10 mg HS

## 2023-05-02 NOTE — PROGRESS NOTES
Assessment/Plan:    Hypothyroidism  Lab Results   Component Value Date    [id number] 1 260 03/07/2023    FREET4 1 37 05/09/2019     On levothyroxine    Hypertension  Acceptable  Continue with aldactone, amlodipine-benazipril     Di[facility] ([facility] )    Lab Results   Component Value Date    HGBA1C 7 5 (H) 03/07/2023     On metformin   A1C went up from prior lab   Counseled about lifestyle changes   We will closely follow, if needed we will discuss about adding another agent     CKD (chronic kidney disease) stage 3, GFR 30-59 ml/min ([facility])  Lab Results   Component Value Date    EGFR 49 03/07/2023    EGFR 51 09/15/2022    EGFR 49 03/04/2022    CREATININE 1 07 03/07/2023    CREATININE 1 04 09/15/2022    CREATININE 1 09 03/04/2022     At baseline  We will monitor periodically     Herpes simplex infection  Valacyclovir      Hyperlipidemia  Lab Results   Component Value Date    CHOLESTEROL 114 03/07/2023    TRIG 180 (H) 03/07/2023    HDL 43 (L) 03/07/2023    LDLCALC 35 03/07/2023     On atorvastatin 10 mg HS    Reactive depression  On lexapro 5 mg daily   Very depressed due to recent demise of her    Denies suicidal ideations  We will increase lexapro to 10 mg daily          Problem List Items Addressed This Visit        Endocrine    Diabetes ([facility] )       Lab Results   Component Value Date    HGBA1C 7 5 (H) 03/07/2023     On metformin   A1C went up from prior lab   Counseled about lifestyle changes   We will closely follow, if needed we will discuss about adding another agent          Relevant Orders    CBC and differential    Comprehensive metabolic panel    Lipid Panel with Direct LDL reflex    TSH, 3rd generation with Free T4 reflex    HEMOGLOBIN A1C W/ EAG ESTIMATION    Hypothyroidism - Primary     Lab Results   Component Value Date    [id number] 1 260 03/07/2023    FREET4 1 37 05/09/2019     On levothyroxine         Relevant Orders    CBC and differential    Comprehensive metabolic panel    Lipid Panel with Direct LDL reflex    TSH, 3rd generation with Free T4 reflex    HEMOGLOBIN A1C W/ EAG ESTIMATION       Cardiovascular and Mediastinum    Hypertension     Acceptable  Continue with aldactone, amlodipine-benazipril          Relevant Orders    CBC and differential    Comprehensive metabolic panel    Lipid Panel with Direct LDL reflex    TSH, 3rd generation with Free T4 reflex    HEMOGLOBIN A1C W/ EAG ESTIMATION       Genitourinary    CKD (chronic kidney disease) stage 3, GFR 30-59 ml/min ([facility])     Lab Results   Component Value Date    EGFR 49 03/07/2023    EGFR 51 09/15/2022    EGFR 49 03/04/2022    CREATININE 1 07 03/07/2023    CREATININE 1 04 09/15/2022    CREATININE 1 09 03/04/2022     At baseline  We will monitor periodically          Relevant Orders    CBC and differential    Comprehensive metabolic panel    Lipid Panel with Direct LDL reflex    TSH, 3rd generation with Free T4 reflex    HEMOGLOBIN A1C W/ EAG ESTIMATION       Other    Herpes simplex infection     Valacyclovir           Relevant Medications    valACYclovir (VALTREX) 1,000 mg tablet    Other Relevant Orders    CBC and differential    Comprehensive metabolic panel    Lipid Panel with Direct LDL reflex    TSH, 3rd generation with Free T4 reflex    HEMOGLOBIN A1C W/ EAG ESTIMATION    Hyperlipidemia     Lab Results   Component Value Date    CHOLESTEROL 114 03/07/2023    TRIG 180 (H) 03/07/2023    HDL 43 (L) 03/07/2023    LDLCALC 35 03/07/2023     On atorvastatin 10 mg HS         Relevant Orders    CBC and differential    Comprehensive metabolic panel    Lipid Panel with Direct LDL reflex    TSH, 3rd generation with Free T4 reflex    HEMOGLOBIN A1C W/ EAG ESTIMATION    Vitamin D deficiency    Relevant Orders    CBC and differential    Comprehensive metabolic panel    Lipid Panel with Direct LDL reflex    TSH, 3rd generation with Free T4 reflex    HEMOGLOBIN A1C W/ EAG ESTIMATION    Reactive depression     On lexapro 5 mg daily   Very depressed due to recent "demise of her    Denies suicidal ideations  We will increase lexapro to 10 mg daily          Relevant Orders    CBC and differential    Comprehensive metabolic panel    Lipid Panel with Direct LDL reflex    TSH, 3rd generation with Free T4 reflex    HEMOGLOBIN A1C W/ EAG ESTIMATION         Subjective:      Patient ID: Carine Rodriguez is a 66 y o  female  Patient is here for regular follow up  She is depressed, due to recent demise of her , however she is trying her best to cope up with new change, she still lives alone but her kids visit her frequently   Hypertension- 132/80 , she is on amlodipine/benazapril , and aldactone  Continue current regimen     HLD- lipid pane stable, on statin    Hypothyroidism- on levothyroxine     Depression- on lexapro 5 mg daily, advised to increase it to 10 mg daily     DM2- on metformin     Mammogram ordered  DEXA ordered   Stress Echo ordered and pending        The following portions of the patient's history were reviewed and updated as appropriate: allergies, current medications, past family history, past medical history, past social history, past surgical history and problem list     Review of Systems   Constitutional: Positive for fatigue  Negative for activity change, appetite change, chills and fever  Respiratory: Negative for cough and shortness of breath  Cardiovascular: Positive for leg swelling  Negative for chest pain and palpitations  Gastrointestinal: Negative for abdominal pain, constipation and diarrhea  Genitourinary: Negative for dysuria and urgency  Musculoskeletal: Positive for back pain  Negative for arthralgias  Neurological: Negative for dizziness, light-headedness and headaches  Psychiatric/Behavioral: Positive for sleep disturbance  Negative for agitation, confusion and suicidal ideas           Objective:      /80 (BP Location: Left arm)   Pulse 80   Resp 16   Ht 5' 4\" (1 626 m)   Wt 80 7 kg (178 lb)   BMI 30 55 " kg/m²          Physical Exam  Vitals reviewed  Constitutional:       Appearance: Normal appearance  HENT:      Head: Atraumatic  Mouth/Throat:      Mouth: Mucous membranes are moist    Eyes:      General: No scleral icterus  Cardiovascular:      Rate and Rhythm: Normal rate and regular rhythm  Heart sounds: Normal heart sounds  Pulmonary:      Effort: No respiratory distress  Breath sounds: Normal breath sounds  Abdominal:      General: Bowel sounds are normal       Palpations: Abdomen is soft  Musculoskeletal:      Cervical back: Neck supple  Right lower leg: No edema  Left lower leg: No edema  Skin:     General: Skin is warm and dry  Capillary Refill: Capillary refill takes less than 2 seconds  Neurological:      Mental Status: She is alert and oriented to person, place, and time  Mental status is at baseline     Psychiatric:         Mood and Affect: Mood normal

## 2023-05-02 NOTE — ASSESSMENT & PLAN NOTE
On lexapro 5 mg daily   Very depressed due to recent demise of her    Denies suicidal ideations  We will increase lexapro to 10 mg daily

## 2023-05-02 NOTE — ASSESSMENT & PLAN NOTE
Lab Results   Component Value Date    HGBA1C 7 5 (H) 03/07/2023     On metformin   A1C went up from prior lab   Counseled about lifestyle changes   We will closely follow, if needed we will discuss about adding another agent

## 2023-05-12 DIAGNOSIS — E11.9 TYPE 2 DIABETES MELLITUS WITHOUT COMPLICATION, WITHOUT LONG-TERM CURRENT USE OF INSULIN (HCC): ICD-10-CM

## 2023-05-12 RX ORDER — METFORMIN HYDROCHLORIDE 500 MG/1
TABLET, EXTENDED RELEASE ORAL
Qty: 360 TABLET | Refills: 1 | Status: SHIPPED | OUTPATIENT
Start: 2023-05-12

## 2023-05-16 ENCOUNTER — HOSPITAL ENCOUNTER (OUTPATIENT)
Age: 78
Discharge: HOME | End: 2023-05-16

## 2023-05-16 DIAGNOSIS — Z78.0 MENOPAUSE: ICD-10-CM

## 2023-06-02 ENCOUNTER — HOSPITAL ENCOUNTER (OUTPATIENT)
Dept: NON INVASIVE DIAGNOSTICS | Facility: HOSPITAL | Age: 78
Discharge: HOME | End: 2023-06-02
Attending: INTERNAL MEDICINE

## 2023-06-02 VITALS
WEIGHT: 178 LBS | DIASTOLIC BLOOD PRESSURE: 64 MMHG | BODY MASS INDEX: 30.55 KG/M2 | HEART RATE: 71 BPM | OXYGEN SATURATION: 95 % | SYSTOLIC BLOOD PRESSURE: 118 MMHG

## 2023-06-02 DIAGNOSIS — R06.09 EXERTIONAL DYSPNEA: ICD-10-CM

## 2023-06-02 LAB
CHEST PAIN STATEMENT: NORMAL
CHEST PAIN STATEMENT: NORMAL
MAX DIASTOLIC BP: 60 MMHG
MAX DIASTOLIC BP: 60 MMHG
MAX HEART RATE: 134 BPM
MAX HEART RATE: 134 BPM
MAX HR PERCENT: 94 %
MAX HR: 134 BPM
MAX PREDICTED HEART RATE: 142 BPM
MAX PREDICTED HEART RATE: 142 BPM
MAX. SYSTOLIC BP: 136 MMHG
MAX. SYSTOLIC BP: 136 MMHG
PROTOCOL NAME: NORMAL
PROTOCOL NAME: NORMAL
RATE PRESSURE PRODUCT: NORMAL
SL CV STRESS RECOVERY BP: NORMAL MMHG
SL CV STRESS RECOVERY HR: 82 BPM
SL CV STRESS RECOVERY O2 SAT: 97 %
STRESS ANGINA INDEX: 0
STRESS BASELINE BP: NORMAL MMHG
STRESS BASELINE HR: 77 BPM
STRESS O2 SAT REST: 95 %
STRESS PEAK HR: 134 BPM
STRESS POST ESTIMATED WORKLOAD: 7 METS
STRESS POST EXERCISE DUR MIN: 4 MIN
STRESS POST EXERCISE DUR SEC: 1 SEC
STRESS POST O2 SAT PEAK: 96 %
STRESS POST PEAK BP: 136 MMHG
TARGET HR FORMULA: NORMAL
TARGET HR FORMULA: NORMAL
TEST INDICATION: NORMAL
TEST INDICATION: NORMAL
TIME IN EXERCISE PHASE: NORMAL
TIME IN EXERCISE PHASE: NORMAL
TR MAX PG: 25 MMHG
TR PEAK VELOCITY: 2.5 M/S
TRICUSPID VALVE PEAK REGURGITATION VELOCITY: 2.51 M/S

## 2023-06-06 LAB
CHEST PAIN STATEMENT: NORMAL
MAX DIASTOLIC BP: 60 MMHG
MAX HEART RATE: 134 BPM
MAX PREDICTED HEART RATE: 142 BPM
MAX. SYSTOLIC BP: 136 MMHG
PROTOCOL NAME: NORMAL
TARGET HR FORMULA: NORMAL
TEST INDICATION: NORMAL
TIME IN EXERCISE PHASE: NORMAL

## 2023-06-10 DIAGNOSIS — I10 ESSENTIAL HYPERTENSION: ICD-10-CM

## 2023-06-11 RX ORDER — SPIRONOLACTONE 25 MG/1
TABLET ORAL
Qty: 90 TABLET | Refills: 3 | Status: SHIPPED | OUTPATIENT
Start: 2023-06-11

## 2023-09-13 ENCOUNTER — APPOINTMENT (OUTPATIENT)
Dept: LAB | Facility: HOSPITAL | Age: 78
End: 2023-09-13
Attending: INTERNAL MEDICINE
Payer: COMMERCIAL

## 2023-09-13 ENCOUNTER — OFFICE VISIT (OUTPATIENT)
Age: 78
End: 2023-09-13
Payer: COMMERCIAL

## 2023-09-13 VITALS
RESPIRATION RATE: 18 BRPM | BODY MASS INDEX: 30.19 KG/M2 | OXYGEN SATURATION: 99 % | HEART RATE: 85 BPM | TEMPERATURE: 97.8 F | WEIGHT: 176.8 LBS | HEIGHT: 64 IN | DIASTOLIC BLOOD PRESSURE: 64 MMHG | SYSTOLIC BLOOD PRESSURE: 138 MMHG

## 2023-09-13 DIAGNOSIS — N30.00 ACUTE CYSTITIS WITHOUT HEMATURIA: Primary | ICD-10-CM

## 2023-09-13 LAB
BACTERIA UR QL AUTO: ABNORMAL /HPF
BILIRUB UR QL STRIP: NEGATIVE
CLARITY UR: ABNORMAL
COLOR UR: YELLOW
GLUCOSE UR STRIP-MCNC: ABNORMAL MG/DL
HGB UR QL STRIP.AUTO: ABNORMAL
KETONES UR STRIP-MCNC: ABNORMAL MG/DL
LEUKOCYTE ESTERASE UR QL STRIP: ABNORMAL
MUCOUS THREADS UR QL AUTO: ABNORMAL
NITRITE UR QL STRIP: POSITIVE
NON-SQ EPI CELLS URNS QL MICRO: ABNORMAL /HPF
PH UR STRIP.AUTO: 5.5 [PH]
PROT UR STRIP-MCNC: ABNORMAL MG/DL
RBC #/AREA URNS AUTO: ABNORMAL /HPF
SP GR UR STRIP.AUTO: 1.02 (ref 1–1.03)
UROBILINOGEN UR STRIP-ACNC: <2 MG/DL
WBC #/AREA URNS AUTO: ABNORMAL /HPF
WBC CLUMPS # UR AUTO: PRESENT /UL

## 2023-09-13 PROCEDURE — 87086 URINE CULTURE/COLONY COUNT: CPT | Performed by: INTERNAL MEDICINE

## 2023-09-13 PROCEDURE — 87077 CULTURE AEROBIC IDENTIFY: CPT | Performed by: INTERNAL MEDICINE

## 2023-09-13 PROCEDURE — 99213 OFFICE O/P EST LOW 20 MIN: CPT | Performed by: INTERNAL MEDICINE

## 2023-09-13 PROCEDURE — 81001 URINALYSIS AUTO W/SCOPE: CPT | Performed by: INTERNAL MEDICINE

## 2023-09-13 PROCEDURE — 87181 SC STD AGAR DILUTION PER AGT: CPT | Performed by: INTERNAL MEDICINE

## 2023-09-13 PROCEDURE — 87186 SC STD MICRODIL/AGAR DIL: CPT | Performed by: INTERNAL MEDICINE

## 2023-09-13 RX ORDER — CEPHALEXIN 500 MG/1
500 CAPSULE ORAL 2 TIMES DAILY
Qty: 14 CAPSULE | Refills: 0 | Status: SHIPPED | OUTPATIENT
Start: 2023-09-13 | End: 2023-09-20

## 2023-09-13 NOTE — PATIENT INSTRUCTIONS
Check UA, start empirically on Keflex. She has tolerated cephalosporins in the past with history of penicillin allergy    Cerumen impaction-start on Debrox for 5 to 7 days then lavaged with warm water either in the shower or with baby bulb syringe and if still having symptoms return for reevaluation while still using the Debrox.
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
## 2023-09-17 LAB
BACTERIA UR CULT: ABNORMAL
BACTERIA UR CULT: ABNORMAL

## 2023-09-18 DIAGNOSIS — N30.00 ACUTE CYSTITIS WITHOUT HEMATURIA: Primary | ICD-10-CM

## 2023-09-18 LAB
BACTERIA UR CULT: ABNORMAL
BACTERIA UR CULT: ABNORMAL

## 2023-09-18 RX ORDER — CIPROFLOXACIN 500 MG/1
500 TABLET, FILM COATED ORAL EVERY 12 HOURS SCHEDULED
Qty: 6 TABLET | Refills: 0 | Status: SHIPPED | OUTPATIENT
Start: 2023-09-18 | End: 2023-09-21

## 2023-11-15 DIAGNOSIS — E11.9 TYPE 2 DIABETES MELLITUS WITHOUT COMPLICATION, WITHOUT LONG-TERM CURRENT USE OF INSULIN (HCC): ICD-10-CM

## 2023-11-15 RX ORDER — METFORMIN HYDROCHLORIDE 500 MG/1
TABLET, EXTENDED RELEASE ORAL
Qty: 360 TABLET | Refills: 1 | Status: SHIPPED | OUTPATIENT
Start: 2023-11-15

## 2024-03-07 DIAGNOSIS — I10 ESSENTIAL HYPERTENSION: ICD-10-CM

## 2024-03-07 DIAGNOSIS — E78.2 MIXED HYPERLIPIDEMIA: ICD-10-CM

## 2024-03-07 RX ORDER — ATORVASTATIN CALCIUM 10 MG/1
TABLET, FILM COATED ORAL
Qty: 90 TABLET | Refills: 3 | Status: SHIPPED | OUTPATIENT
Start: 2024-03-07

## 2024-03-07 RX ORDER — AMLODIPINE BESYLATE AND BENAZEPRIL HYDROCHLORIDE 5; 10 MG/1; MG/1
1 CAPSULE ORAL DAILY
Qty: 90 CAPSULE | Refills: 3 | Status: SHIPPED | OUTPATIENT
Start: 2024-03-07

## 2024-03-23 DIAGNOSIS — I10 ESSENTIAL HYPERTENSION: ICD-10-CM

## 2024-03-23 DIAGNOSIS — F32.9 REACTIVE DEPRESSION: ICD-10-CM

## 2024-03-24 RX ORDER — SPIRONOLACTONE 25 MG/1
TABLET ORAL
Qty: 90 TABLET | Refills: 3 | Status: SHIPPED | OUTPATIENT
Start: 2024-03-24

## 2024-03-24 RX ORDER — ESCITALOPRAM OXALATE 10 MG/1
5 TABLET ORAL DAILY
Qty: 45 TABLET | Refills: 7 | Status: SHIPPED | OUTPATIENT
Start: 2024-03-24

## 2024-04-19 ENCOUNTER — TELEPHONE (OUTPATIENT)
Age: 79
End: 2024-04-19

## 2024-05-12 DIAGNOSIS — E11.9 TYPE 2 DIABETES MELLITUS WITHOUT COMPLICATION, WITHOUT LONG-TERM CURRENT USE OF INSULIN (HCC): ICD-10-CM

## 2024-05-12 RX ORDER — METFORMIN HYDROCHLORIDE 500 MG/1
TABLET, EXTENDED RELEASE ORAL
Qty: 360 TABLET | Refills: 1 | Status: SHIPPED | OUTPATIENT
Start: 2024-05-12

## 2024-07-25 ENCOUNTER — OFFICE VISIT (OUTPATIENT)
Age: 79
End: 2024-07-25
Payer: COMMERCIAL

## 2024-07-25 VITALS
OXYGEN SATURATION: 98 % | WEIGHT: 175 LBS | DIASTOLIC BLOOD PRESSURE: 60 MMHG | SYSTOLIC BLOOD PRESSURE: 120 MMHG | HEIGHT: 64 IN | HEART RATE: 73 BPM | BODY MASS INDEX: 29.88 KG/M2

## 2024-07-25 DIAGNOSIS — E11.9 TYPE 2 DIABETES MELLITUS WITHOUT COMPLICATION, WITHOUT LONG-TERM CURRENT USE OF INSULIN (HCC): ICD-10-CM

## 2024-07-25 DIAGNOSIS — F33.9 DEPRESSION, RECURRENT (HCC): ICD-10-CM

## 2024-07-25 DIAGNOSIS — Z12.11 SCREENING FOR COLON CANCER: ICD-10-CM

## 2024-07-25 DIAGNOSIS — H61.22 IMPACTED CERUMEN OF LEFT EAR: ICD-10-CM

## 2024-07-25 DIAGNOSIS — I10 PRIMARY HYPERTENSION: Primary | ICD-10-CM

## 2024-07-25 DIAGNOSIS — E03.9 ACQUIRED HYPOTHYROIDISM: ICD-10-CM

## 2024-07-25 DIAGNOSIS — H60.331 ACUTE SWIMMER'S EAR OF RIGHT SIDE: ICD-10-CM

## 2024-07-25 DIAGNOSIS — N18.30 STAGE 3 CHRONIC KIDNEY DISEASE, UNSPECIFIED WHETHER STAGE 3A OR 3B CKD (HCC): ICD-10-CM

## 2024-07-25 DIAGNOSIS — K58.2 IRRITABLE BOWEL SYNDROME WITH BOTH CONSTIPATION AND DIARRHEA: ICD-10-CM

## 2024-07-25 DIAGNOSIS — E78.2 MIXED HYPERLIPIDEMIA: ICD-10-CM

## 2024-07-25 DIAGNOSIS — R25.2 LEG CRAMPS: ICD-10-CM

## 2024-07-25 DIAGNOSIS — F32.9 REACTIVE DEPRESSION: ICD-10-CM

## 2024-07-25 PROCEDURE — 69209 REMOVE IMPACTED EAR WAX UNI: CPT | Performed by: INTERNAL MEDICINE

## 2024-07-25 PROCEDURE — 99214 OFFICE O/P EST MOD 30 MIN: CPT | Performed by: INTERNAL MEDICINE

## 2024-07-25 PROCEDURE — G0439 PPPS, SUBSEQ VISIT: HCPCS | Performed by: INTERNAL MEDICINE

## 2024-07-25 RX ORDER — NEOMYCIN SULFATE, POLYMYXIN B SULFATE AND HYDROCORTISONE 10; 3.5; 1 MG/ML; MG/ML; [USP'U]/ML
4 SUSPENSION/ DROPS AURICULAR (OTIC) 4 TIMES DAILY
Qty: 10 ML | Refills: 0 | Status: SHIPPED | OUTPATIENT
Start: 2024-07-25

## 2024-07-25 NOTE — PATIENT INSTRUCTIONS
Check labs, will follow accordingly    Cerumen impaction removed with lavage, treated prophylactically with Cortisporin otic 3 to 4 drops 3-4 times a day for for 5 days    Depression-monitor symptoms, consider resumption of antidepressant medication at follow-up    Irritable bowel syndrome with alternating diarrhea and constipation-try daily Metamucil 1-2 heaping tablespoons in water.  Consider MiraLAX if tending to be constipated, okay to use half to a whole Imodium if having watery diarrhea    Medicare Preventive Visit Patient Instructions  Thank you for completing your Welcome to Medicare Visit or Medicare Annual Wellness Visit today. Your next wellness visit will be due in one year (7/26/2025).  The screening/preventive services that you may require over the next 5-10 years are detailed below. Some tests may not apply to you based off risk factors and/or age. Screening tests ordered at today's visit but not completed yet may show as past due. Also, please note that scanned in results may not display below.  Preventive Screenings:  Service Recommendations Previous Testing/Comments   Colorectal Cancer Screening  * Colonoscopy    * Fecal Occult Blood Test (FOBT)/Fecal Immunochemical Test (FIT)  * Fecal DNA/Cologuard Test  * Flexible Sigmoidoscopy Age: 45-75 years old   Colonoscopy: every 10 years (may be performed more frequently if at higher risk)  OR  FOBT/FIT: every 1 year  OR  Cologuard: every 3 years  OR  Sigmoidoscopy: every 5 years  Screening may be recommended earlier than age 45 if at higher risk for colorectal cancer. Also, an individualized decision between you and your healthcare provider will decide whether screening between the ages of 76-85 would be appropriate. Colonoscopy: 06/18/2019  FOBT/FIT: Not on file  Cologuard: Not on file  Sigmoidoscopy: Not on file    Screening Current     Breast Cancer Screening Age: 40+ years old  Frequency: every 1-2 years  Not required if history of left and right  mastectomy Mammogram: 10/24/2018    Risks and Benefits Discussed   Cervical Cancer Screening Between the ages of 21-29, pap smear recommended once every 3 years.   Between the ages of 30-65, can perform pap smear with HPV co-testing every 5 years.   Recommendations may differ for women with a history of total hysterectomy, cervical cancer, or abnormal pap smears in past. Pap Smear: Not on file    Screening Not Indicated   Hepatitis C Screening Once for adults born between 1945 and 1965  More frequently in patients at high risk for Hepatitis C Hep C Antibody: 12/14/2020    Screening Current   Diabetes Screening 1-2 times per year if you're at risk for diabetes or have pre-diabetes Fasting glucose: 183 mg/dL (3/7/2023)  A1C: 7.5 % (3/7/2023)  Screening Not Indicated  History Diabetes   Cholesterol Screening Once every 5 years if you don't have a lipid disorder. May order more often based on risk factors. Lipid panel: 03/07/2023    Screening Not Indicated  History Lipid Disorder     Other Preventive Screenings Covered by Medicare:  Abdominal Aortic Aneurysm (AAA) Screening: covered once if your at risk. You're considered to be at risk if you have a family history of AAA.  Lung Cancer Screening: covers low dose CT scan once per year if you meet all of the following conditions: (1) Age 55-77; (2) No signs or symptoms of lung cancer; (3) Current smoker or have quit smoking within the last 15 years; (4) You have a tobacco smoking history of at least 20 pack years (packs per day multiplied by number of years you smoked); (5) You get a written order from a healthcare provider.  Glaucoma Screening: covered annually if you're considered high risk: (1) You have diabetes OR (2) Family history of glaucoma OR (3)  aged 50 and older OR (4)  American aged 65 and older  Osteoporosis Screening: covered every 2 years if you meet one of the following conditions: (1) You're estrogen deficient and at risk for  osteoporosis based off medical history and other findings; (2) Have a vertebral abnormality; (3) On glucocorticoid therapy for more than 3 months; (4) Have primary hyperparathyroidism; (5) On osteoporosis medications and need to assess response to drug therapy.   Last bone density test (DXA Scan): 05/16/2023.  HIV Screening: covered annually if you're between the age of 15-65. Also covered annually if you are younger than 15 and older than 65 with risk factors for HIV infection. For pregnant patients, it is covered up to 3 times per pregnancy.    Immunizations:  Immunization Recommendations   Influenza Vaccine Annual influenza vaccination during flu season is recommended for all persons aged >= 6 months who do not have contraindications   Pneumococcal Vaccine   * Pneumococcal conjugate vaccine = PCV13 (Prevnar 13), PCV15 (Vaxneuvance), PCV20 (Prevnar 20)  * Pneumococcal polysaccharide vaccine = PPSV23 (Pneumovax) Adults 19-65 yo with certain risk factors or if 65+ yo  If never received any pneumonia vaccine: recommend Prevnar 20 (PCV20)  Give PCV20 if previously received 1 dose of PCV13 or PPSV23   Hepatitis B Vaccine 3 dose series if at intermediate or high risk (ex: diabetes, end stage renal disease, liver disease)   Respiratory syncytial virus (RSV) Vaccine - COVERED BY MEDICARE PART D  * RSVPreF3 (Arexvy) CDC recommends that adults 60 years of age and older may receive a single dose of RSV vaccine using shared clinical decision-making (SCDM)   Tetanus (Td) Vaccine - COST NOT COVERED BY MEDICARE PART B Following completion of primary series, a booster dose should be given every 10 years to maintain immunity against tetanus. Td may also be given as tetanus wound prophylaxis.   Tdap Vaccine - COST NOT COVERED BY MEDICARE PART B Recommended at least once for all adults. For pregnant patients, recommended with each pregnancy.   Shingles Vaccine (Shingrix) - COST NOT COVERED BY MEDICARE PART B  2 shot series  recommended in those 19 years and older who have or will have weakened immune systems or those 50 years and older     Health Maintenance Due:      Topic Date Due    Colorectal Cancer Screening  06/18/2024    DXA SCAN  05/16/2025    Hepatitis C Screening  Completed     Immunizations Due:      Topic Date Due    COVID-19 Vaccine (5 - 2023-24 season) 09/01/2023    Influenza Vaccine (1) 09/01/2024     Advance Directives   What are advance directives?  Advance directives are legal documents that state your wishes and plans for medical care. These plans are made ahead of time in case you lose your ability to make decisions for yourself. Advance directives can apply to any medical decision, such as the treatments you want, and if you want to donate organs.   What are the types of advance directives?  There are many types of advance directives, and each state has rules about how to use them. You may choose a combination of any of the following:  Living will:  This is a written record of the treatment you want. You can also choose which treatments you do not want, which to limit, and which to stop at a certain time. This includes surgery, medicine, IV fluid, and tube feedings.   Durable power of  for healthcare (DPAHC):  This is a written record that states who you want to make healthcare choices for you when you are unable to make them for yourself. This person, called a proxy, is usually a family member or a friend. You may choose more than 1 proxy.  Do not resuscitate (DNR) order:  A DNR order is used in case your heart stops beating or you stop breathing. It is a request not to have certain forms of treatment, such as CPR. A DNR order may be included in other types of advance directives.  Medical directive:  This covers the care that you want if you are in a coma, near death, or unable to make decisions for yourself. You can list the treatments you want for each condition. Treatment may include pain medicine,  surgery, blood transfusions, dialysis, IV or tube feedings, and a ventilator (breathing machine).  Values history:  This document has questions about your views, beliefs, and how you feel and think about life. This information can help others choose the care that you would choose.  Why are advance directives important?  An advance directive helps you control your care. Although spoken wishes may be used, it is better to have your wishes written down. Spoken wishes can be misunderstood, or not followed. Treatments may be given even if you do not want them. An advance directive may make it easier for your family to make difficult choices about your care.   Urinary Incontinence   Urinary incontinence (UI)  is when you lose control of your bladder. UI develops because your bladder cannot store or empty urine properly. The 3 most common types of UI are stress incontinence, urge incontinence, or both.  Medicines:   May be given to help strengthen your bladder control. Report any side effects of medication to your healthcare provider.  Do pelvic muscle exercises often:  Your pelvic muscles help you stop urinating. Squeeze these muscles tight for 5 seconds, then relax for 5 seconds. Gradually work up to squeezing for 10 seconds. Do 3 sets of 15 repetitions a day, or as directed. This will help strengthen your pelvic muscles and improve bladder control.  Train your bladder:  Go to the bathroom at set times, such as every 2 hours, even if you do not feel the urge to go. You can also try to hold your urine when you feel the urge to go. For example, hold your urine for 5 minutes when you feel the urge to go. As that becomes easier, hold your urine for 10 minutes.   Self-care:   Keep a UI record.  Write down how often you leak urine and how much you leak. Make a note of what you were doing when you leaked urine.  Drink liquids as directed. You may need to limit the amount of liquid you drink to help control your urine leakage.  Do not drink any liquid right before you go to bed. Limit or do not have drinks that contain caffeine or alcohol.   Prevent constipation.  Eat a variety of high-fiber foods. Good examples are high-fiber cereals, beans, vegetables, and whole-grain breads. Walking is the best way to trigger your intestines to have a bowel movement.  Exercise regularly and maintain a healthy weight.  Weight loss and exercise will decrease pressure on your bladder and help you control your leakage.   Use a catheter as directed  to help empty your bladder. A catheter is a tiny, plastic tube that is put into your bladder to drain your urine.   Go to behavior therapy as directed.  Behavior therapy may be used to help you learn to control your urge to urinate.    Weight Management   Why it is important to manage your weight:  Being overweight increases your risk of health conditions such as heart disease, high blood pressure, type 2 diabetes, and certain types of cancer. It can also increase your risk for osteoarthritis, sleep apnea, and other respiratory problems. Aim for a slow, steady weight loss. Even a small amount of weight loss can lower your risk of health problems.  How to lose weight safely:  A safe and healthy way to lose weight is to eat fewer calories and get regular exercise. You can lose up about 1 pound a week by decreasing the number of calories you eat by 500 calories each day.   Healthy meal plan for weight management:  A healthy meal plan includes a variety of foods, contains fewer calories, and helps you stay healthy. A healthy meal plan includes the following:  Eat whole-grain foods more often.  A healthy meal plan should contain fiber. Fiber is the part of grains, fruits, and vegetables that is not broken down by your body. Whole-grain foods are healthy and provide extra fiber in your diet. Some examples of whole-grain foods are whole-wheat breads and pastas, oatmeal, brown rice, and bulgur.  Eat a variety of  vegetables every day.  Include dark, leafy greens such as spinach, kale, luis greens, and mustard greens. Eat yellow and orange vegetables such as carrots, sweet potatoes, and winter squash.   Eat a variety of fruits every day.  Choose fresh or canned fruit (canned in its own juice or light syrup) instead of juice. Fruit juice has very little or no fiber.  Eat low-fat dairy foods.  Drink fat-free (skim) milk or 1% milk. Eat fat-free yogurt and low-fat cottage cheese. Try low-fat cheeses such as mozzarella and other reduced-fat cheeses.  Choose meat and other protein foods that are low in fat.  Choose beans or other legumes such as split peas or lentils. Choose fish, skinless poultry (chicken or turkey), or lean cuts of red meat (beef or pork). Before you cook meat or poultry, cut off any visible fat.   Use less fat and oil.  Try baking foods instead of frying them. Add less fat, such as margarine, sour cream, regular salad dressing and mayonnaise to foods. Eat fewer high-fat foods. Some examples of high-fat foods include french fries, doughnuts, ice cream, and cakes.  Eat fewer sweets.  Limit foods and drinks that are high in sugar. This includes candy, cookies, regular soda, and sweetened drinks.  Exercise:  Exercise at least 30 minutes per day on most days of the week. Some examples of exercise include walking, biking, dancing, and swimming. You can also fit in more physical activity by taking the stairs instead of the elevator or parking farther away from stores. Ask your healthcare provider about the best exercise plan for you.      © Copyright [employer] 2018 Information is for End User's use only and may not be sold, redistributed or otherwise used for commercial purposes. All illustrations and images included in CareNotes® are the copyrighted property of [employer] Inc. or [employer]      Patient Education     Type 2 diabetes   The Basics   Written by the doctors and editors at [facility]   What  "is type 2 diabetes? -- This is a disorder that disrupts the way the body uses sugar. It is sometimes called type 2 diabetes mellitus.  All of the cells in the body need sugar to work normally. Sugar gets into the cells with the help of a hormone called insulin. Insulin is made by the pancreas, an organ in the belly. If there is not enough insulin, or if cells in the body don't respond normally to insulin, sugar builds up in the blood. That is what happens to people with diabetes.  There are 2 different types of diabetes:   In type 1 diabetes, the pancreas makes little or no insulin.   In type 2 diabetes, the pancreas still makes some insulin, but the cells in the body stop responding normally. Eventually, the pancreas cannot make enough insulin to keep up.  Having excess body weight or obesity increases a person's risk of developing type 2 diabetes. But people without excess body weight can get diabetes, too.  What are the symptoms of type 2 diabetes? -- Type 2 diabetes usually causes no symptoms. When symptoms do happen, they include:   Needing to urinate often   Intense thirst   Blurry vision  Can diabetes lead to other health problems? -- Yes. Type 2 diabetes might not make you feel sick. But if it is not managed, it can lead to serious problems over time, such as:   Heart attacks   Strokes   Kidney disease   Vision problems (or even blindness)   Pain or loss of feeling in the hands and feet   Needing to have fingers, toes, or other body parts removed (amputated)  How do I know if I have type 2 diabetes? -- Your doctor or nurse can do a blood test. There are 2 tests that can be used for this. Both involve measuring the amount of sugar in your blood, called your \"blood sugar\" or \"blood glucose\":   One of the tests measures your blood sugar at the time the blood sample is taken. This test is done in the morning. You can't eat or drink anything except water for at least 8 hours before the test.   The other test " "shows what your average blood sugar has been for the past 2 to 3 months. This blood test is called \"hemoglobin A1C\" or just \"A1C.\" It can be checked at any time of the day, even if you have recently eaten.  How is type 2 diabetes treated? -- The goals of treatment are to manage your blood sugar and lower the risk of future problems that can happen in people with diabetes.  Treatment might include:   Lifestyle changes - This is an important part of managing diabetes. It includes eating healthy foods and getting plenty of physical activity.   Medicines - There are a few medicines that help lower blood sugar. Some people need to take pills that help the body make more insulin or that help insulin do its job. Others need insulin shots.  Depending on what medicines you take, you might need to check your blood sugar regularly at home. But not everyone with type 2 diabetes needs to do this. Your doctor or nurse will tell you if you should be checking your blood sugar, and when and how to do this.  Sometimes, people with type 2 diabetes also need medicines to help prevent problems caused by the disease. For instance, medicines used to lower blood pressure can reduce the chances of a heart attack or stroke.   General medical care - It's also important to take care of other areas of your health. This includes watching your blood pressure and cholesterol levels. You should also get certain vaccines, such as vaccines to protect against the flu and coronavirus disease 2019 (\"COVID-19\"). Some people also need a vaccine to prevent pneumonia.  Can type 2 diabetes be prevented? -- Yes. To lower your chances of getting type 2 diabetes, the most important thing you can do is eat a healthy diet and get plenty of physical activity. This can help you lose weight if you are overweight. But eating well and being active are also good for your overall health. Even gentle activity, like walking, has benefits.  If you smoke, quitting can also " lower your risk of type 2 diabetes. Quitting smoking can be difficult, but your doctor or nurse can help.  All topics are updated as new evidence becomes available and our peer review process is complete.  This topic retrieved from [employer] on: Apr 24, 2024.  Topic 25234 Version 23.0  Release: 32.3.2 - C32.113  © 2024 UpToDate, Inc. and/or its affiliates. All rights reserved.  Consumer Information Use and Disclaimer   Disclaimer: This generalized information is a limited summary of diagnosis, treatment, and/or medication information. It is not meant to be comprehensive and should be used as a tool to help the user understand and/or assess potential diagnostic and treatment options. It does NOT include all information about conditions, treatments, medications, side effects, or risks that may apply to a specific patient. It is not intended to be medical advice or a substitute for the medical advice, diagnosis, or treatment of a health care provider based on the health care provider's examination and assessment of a patient's specific and unique circumstances. Patients must speak with a health care provider for complete information about their health, medical questions, and treatment options, including any risks or benefits regarding use of medications. This information does not endorse any treatments or medications as safe, effective, or approved for treating a specific patient. UpToDate, Inc. and its affiliates disclaim any warranty or liability relating to this information or the use thereof.The use of this information is governed by the Terms of Use, available at https://www.[employer].com/en/know/clinical-effectiveness-terms. 2024© UpToDate, Inc. and its affiliates and/or licensors. All rights reserved.  Copyright   © 2024 Up[employer]Date, Inc. and/or its affiliates. All rights reserved.

## 2024-07-25 NOTE — PROGRESS NOTES
Ambulatory Visit  Name: Delmi Epps      : 1945      MRN: [id number]  Encounter Provider: Sanford Galeana MD  Encounter Date: 2024   Encounter department: [facility] INTERNAL MEDICINE [facility] [address]    Assessment & Plan   1. Primary hypertension  2. Acquired hypothyroidism  3. Type 2 diabetes mellitus without complication, without long-term current use of insulin (HCC)  -     Ambulatory Referral to Ophthalmology; Future  -     CBC and differential; Future  -     Comprehensive metabolic panel; Future  -     Lipid panel; Future  -     Hemoglobin A1C; Future  -     TSH, 3rd generation with Free T4 reflex; Future  -     Uric acid; Future  -     Albumin / creatinine urine ratio; Future  4. Stage 3 chronic kidney disease, unspecified whether stage 3a or 3b CKD (HCC)  5. Reactive depression  6. Mixed hyperlipidemia  7. Impacted cerumen of left ear  8. Leg cramps  -     Phosphorus; Future  -     Magnesium; Future  9. Irritable bowel syndrome with both constipation and diarrhea  10. Acute swimmer's ear of right side  -     neomycin-polymyxin-hydrocortisone (CORTISPORIN) 0.35%-10,000 units/mL-1% otic suspension; Administer 4 drops to the right ear 4 (four) times a day  11. Screening for colon cancer  -     Ambulatory Referral to Gastroenterology; Future  12. Depression, recurrent (HCC)       Preventive health issues were discussed with patient, and age appropriate screening tests were ordered as noted in patient's After Visit Summary. Personalized health advice and appropriate referrals for health education or preventive services given if needed, as noted in patient's After Visit Summary.    History of Present Illness     F/u mmp and AWV.   Feeling ok, but multiple issues  Notes clogging and hearing loss R ear off and on.  Using debrox.  Notes diarrhea alt w/ constipation, not taking any fiber supplements.    Was on extended vacation w/ cruise and lots of walking, then had leg cramps in b/l thighs that required  getting up and had to walk and stretch.   Keeping active, but no formal exercise.    HTN/HPL-taking rx as directed, no home bp's  DM- Taking metformin as directed.  Not as strict w/ diet.  No regular sugar testing.    OAB-stable since surgery, rare urgency and incontinence.  Hypothyroid taking rx as directed.   Depression-increased since Raymond's passing. Stopped lexapro d/t feeling it was causing memory and decision making issues.         Patient Care Team:  Sanford Galeana MD as PCP - General  Clyde Cabrales MD    Review of Systems   Constitutional:  Negative for appetite change, chills, diaphoresis, fatigue, fever and unexpected weight change.   HENT:  Negative for congestion, hearing loss and rhinorrhea.    Eyes:  Negative for visual disturbance.   Respiratory:  Negative for cough, chest tightness, shortness of breath and wheezing.    Cardiovascular:  Negative for chest pain, palpitations and leg swelling.   Gastrointestinal:  Negative for abdominal pain and blood in stool.   Endocrine: Negative for cold intolerance, heat intolerance, polydipsia and polyuria.   Genitourinary:  Negative for difficulty urinating, dysuria, frequency and urgency.   Musculoskeletal:  Negative for arthralgias and myalgias.   Skin:  Negative for rash.   Neurological:  Negative for dizziness, weakness, light-headedness and headaches.   Hematological:  Does not bruise/bleed easily.   Psychiatric/Behavioral:  Negative for dysphoric mood and sleep disturbance.      Medical History Reviewed by provider this encounter:  Tobacco  Allergies  Meds  Problems  Med Hx  Surg Hx  Fam Hx       Annual Wellness Visit Questionnaire       Health Risk Assessment:   Patient rates overall health as fair. Patient feels that their physical health rating is slightly worse. Patient is satisfied with their life. Eyesight was rated as same. Hearing was rated as slightly worse. Patient feels that their emotional and mental health rating is same.  Patients states they are never, rarely angry. Patient states they are always unusually tired/fatigued. Pain experienced in the last 7 days has been none. Patient states that she has experienced no weight loss or gain in last 6 months.     Depression Screening:   PHQ-9 Score: 5      Fall Risk Screening:   In the past year, patient has experienced: no history of falling in past year      Urinary Incontinence Screening:   Patient has not leaked urine accidently in the last six months.     Home Safety:  Patient does not have trouble with stairs inside or outside of their home. Patient has working smoke alarms and has working carbon monoxide detector. Home safety hazards include: none.     Nutrition:   Current diet is Regular.     Medications:   Patient is not currently taking any over-the-counter supplements. Patient is able to manage medications.     Activities of Daily Living (ADLs)/Instrumental Activities of Daily Living (IADLs):   Walk and transfer into and out of bed and chair?: Yes  Dress and groom yourself?: Yes    Bathe or shower yourself?: Yes    Feed yourself? Yes  Do your laundry/housekeeping?: Yes  Manage your money, pay your bills and track your expenses?: Yes  Make your own meals?: Yes    Do your own shopping?: Yes    Previous Hospitalizations:   Any hospitalizations or ED visits within the last 12 months?: No      Advance Care Planning:   Living will: Yes    Durable POA for healthcare: Yes    Advanced directive: Yes      Cognitive Screening:   Provider or family/friend/caregiver concerned regarding cognition?: No    PREVENTIVE SCREENINGS      Cardiovascular Screening:    General: Screening Not Indicated and History Lipid Disorder      Diabetes Screening:     General: Screening Not Indicated and History Diabetes      Colorectal Cancer Screening:     General: Screening Current      Breast Cancer Screening:     General: Risks and Benefits Discussed      Cervical Cancer Screening:    General: Screening Not  "Indicated      Osteoporosis Screening:    General: Screening Current      Abdominal Aortic Aneurysm (AAA) Screening:        General: Screening Not Indicated      Lung Cancer Screening:     General: Screening Not Indicated      Hepatitis C Screening:    General: Screening Current    Screening, Brief Intervention, and Referral to Treatment (SBIRT)    Screening  Typical number of drinks in a day: 0  Typical number of drinks in a week: 0  Interpretation: Low risk drinking behavior.    Single Item Drug Screening:  How often have you used an illegal drug (including marijuana) or a prescription medication for non-medical reasons in the past year? never    Single Item Drug Screen Score: 0  Interpretation: Negative screen for possible drug use disorder    Social Determinants of Health     Financial Resource Strain: Low Risk  (3/7/2023)    Overall Financial Resource Strain (CARDIA)     Difficulty of Paying Living Expenses: Not hard at all   Food Insecurity: No Food Insecurity (7/25/2024)    Hunger Vital Sign     Worried About Running Out of Food in the Last Year: Never true     Ran Out of Food in the Last Year: Never true   Transportation Needs: No Transportation Needs (7/25/2024)    PRAPARE - Transportation     Lack of Transportation (Medical): No     Lack of Transportation (Non-Medical): No   Housing Stability: Unknown (7/25/2024)    Housing Stability Vital Sign     Unable to Pay for Housing in the Last Year: No     Homeless in the Last Year: No   Utilities: Not At Risk (7/25/2024)    [facility] Utilities     Threatened with loss of utilities: No     No results found.    Objective     /60   Pulse 73   Ht 5' 4\" (1.626 m)   Wt 79.4 kg (175 lb)   SpO2 98%   BMI 30.04 kg/m²     Physical Exam  Constitutional:       Appearance: She is well-developed.   HENT:      Head: Normocephalic and atraumatic.      Right Ear: There is impacted cerumen.      Nose: Nose normal.   Eyes:      General: No scleral icterus.     " Conjunctiva/sclera: Conjunctivae normal.      Pupils: Pupils are equal, round, and reactive to light.   Neck:      Thyroid: No thyromegaly.      Vascular: No JVD.      Trachea: No tracheal deviation.   Cardiovascular:      Rate and Rhythm: Normal rate and regular rhythm.      Heart sounds: No murmur heard.     No friction rub. No gallop.   Pulmonary:      Effort: Pulmonary effort is normal. No respiratory distress.      Breath sounds: Normal breath sounds. No wheezing or rales.   Abdominal:      General: Bowel sounds are normal. There is no distension.      Palpations: Abdomen is soft. There is no mass.      Tenderness: There is no abdominal tenderness. There is no guarding or rebound.   Musculoskeletal:         General: No tenderness.      Cervical back: Normal range of motion and neck supple.   Lymphadenopathy:      Cervical: No cervical adenopathy.   Skin:     General: Skin is warm and dry.      Findings: No erythema or rash.   Neurological:      Mental Status: She is alert and oriented to person, place, and time.      Cranial Nerves: No cranial nerve deficit.   Psychiatric:         Behavior: Behavior normal. Behavior is cooperative.         Thought Content: Thought content normal.         Judgment: Judgment normal.     Ear cerumen removal    Date/Time: 7/25/2024 3:00 PM    Performed by: Sanford Galeana MD  Authorized by: Sanford Galeana MD  Universal Protocol:  Consent: Verbal consent obtained.  Risks and benefits: risks, benefits and alternatives were discussed  Patient understanding: patient states understanding of the procedure being performed    Patient location:  Clinic  Procedure details:     Local anesthetic:  None    Location:  R ear    Procedure type: irrigation only      Approach:  External  Post-procedure details:     Complication:  Macerated skin    Hearing quality:  Improved    Patient tolerance of procedure:  Tolerated well, no immediate complications

## 2024-07-26 ENCOUNTER — APPOINTMENT (OUTPATIENT)
Dept: LAB | Facility: HOSPITAL | Age: 79
End: 2024-07-26
Payer: COMMERCIAL

## 2024-07-26 DIAGNOSIS — R25.2 LEG CRAMPS: ICD-10-CM

## 2024-07-26 DIAGNOSIS — E11.9 TYPE 2 DIABETES MELLITUS WITHOUT COMPLICATION, WITHOUT LONG-TERM CURRENT USE OF INSULIN (HCC): ICD-10-CM

## 2024-07-26 LAB
ALBUMIN SERPL BCG-MCNC: 4.3 G/DL (ref 3.5–5)
ALP SERPL-CCNC: 43 U/L (ref 34–104)
ALT SERPL W P-5'-P-CCNC: 25 U/L (ref 7–52)
ANION GAP SERPL CALCULATED.3IONS-SCNC: 7 MMOL/L (ref 4–13)
AST SERPL W P-5'-P-CCNC: 22 U/L (ref 13–39)
BASOPHILS # BLD AUTO: 0.07 THOUSANDS/ÂΜL (ref 0–0.1)
BASOPHILS NFR BLD AUTO: 1 % (ref 0–1)
BILIRUB SERPL-MCNC: 0.42 MG/DL (ref 0.2–1)
BUN SERPL-MCNC: 19 MG/DL (ref 5–25)
CALCIUM SERPL-MCNC: 9.5 MG/DL (ref 8.4–10.2)
CHLORIDE SERPL-SCNC: 105 MMOL/L (ref 96–108)
CHOLEST SERPL-MCNC: 127 MG/DL
CO2 SERPL-SCNC: 27 MMOL/L (ref 21–32)
CREAT SERPL-MCNC: 0.96 MG/DL (ref 0.6–1.3)
CREAT UR-MCNC: 146.6 MG/DL
EOSINOPHIL # BLD AUTO: 0.2 THOUSAND/ÂΜL (ref 0–0.61)
EOSINOPHIL NFR BLD AUTO: 2 % (ref 0–6)
ERYTHROCYTE [DISTWIDTH] IN BLOOD BY AUTOMATED COUNT: 12.3 % (ref 11.6–15.1)
EST. AVERAGE GLUCOSE BLD GHB EST-MCNC: 203 MG/DL
GFR SERPL CREATININE-BSD FRML MDRD: 56 ML/MIN/1.73SQ M
GLUCOSE P FAST SERPL-MCNC: 199 MG/DL (ref 65–99)
HBA1C MFR BLD: 8.7 %
HCT VFR BLD AUTO: 39 % (ref 34.8–46.1)
HDLC SERPL-MCNC: 43 MG/DL
HGB BLD-MCNC: 13.2 G/DL (ref 11.5–15.4)
IMM GRANULOCYTES # BLD AUTO: 0.05 THOUSAND/UL (ref 0–0.2)
IMM GRANULOCYTES NFR BLD AUTO: 1 % (ref 0–2)
LDLC SERPL CALC-MCNC: 37 MG/DL (ref 0–100)
LYMPHOCYTES # BLD AUTO: 2.08 THOUSANDS/ÂΜL (ref 0.6–4.47)
LYMPHOCYTES NFR BLD AUTO: 22 % (ref 14–44)
MAGNESIUM SERPL-MCNC: 1.3 MG/DL (ref 1.9–2.7)
MCH RBC QN AUTO: 33.2 PG (ref 26.8–34.3)
MCHC RBC AUTO-ENTMCNC: 33.8 G/DL (ref 31.4–37.4)
MCV RBC AUTO: 98 FL (ref 82–98)
MICROALBUMIN UR-MCNC: 28.7 MG/L
MICROALBUMIN/CREAT 24H UR: 20 MG/G CREATININE (ref 0–30)
MONOCYTES # BLD AUTO: 0.59 THOUSAND/ÂΜL (ref 0.17–1.22)
MONOCYTES NFR BLD AUTO: 6 % (ref 4–12)
NEUTROPHILS # BLD AUTO: 6.32 THOUSANDS/ÂΜL (ref 1.85–7.62)
NEUTS SEG NFR BLD AUTO: 68 % (ref 43–75)
NONHDLC SERPL-MCNC: 84 MG/DL
NRBC BLD AUTO-RTO: 0 /100 WBCS
PHOSPHATE SERPL-MCNC: 3.2 MG/DL (ref 2.3–4.1)
PLATELET # BLD AUTO: 229 THOUSANDS/UL (ref 149–390)
PMV BLD AUTO: 9.4 FL (ref 8.9–12.7)
POTASSIUM SERPL-SCNC: 4.5 MMOL/L (ref 3.5–5.3)
PROT SERPL-MCNC: 7.4 G/DL (ref 6.4–8.4)
RBC # BLD AUTO: 3.98 MILLION/UL (ref 3.81–5.12)
SODIUM SERPL-SCNC: 139 MMOL/L (ref 135–147)
T4 FREE SERPL-MCNC: 0.57 NG/DL (ref 0.61–1.12)
TRIGL SERPL-MCNC: 236 MG/DL
TSH SERPL DL<=0.05 MIU/L-ACNC: 7.57 UIU/ML (ref 0.45–4.5)
URATE SERPL-MCNC: 4.5 MG/DL (ref 2–7.5)
WBC # BLD AUTO: 9.31 THOUSAND/UL (ref 4.31–10.16)

## 2024-07-26 PROCEDURE — 84100 ASSAY OF PHOSPHORUS: CPT

## 2024-07-26 PROCEDURE — 82043 UR ALBUMIN QUANTITATIVE: CPT

## 2024-07-26 PROCEDURE — 84443 ASSAY THYROID STIM HORMONE: CPT

## 2024-07-26 PROCEDURE — 84550 ASSAY OF BLOOD/URIC ACID: CPT

## 2024-07-26 PROCEDURE — 84439 ASSAY OF FREE THYROXINE: CPT

## 2024-07-26 PROCEDURE — 82570 ASSAY OF URINE CREATININE: CPT

## 2024-07-26 PROCEDURE — 83036 HEMOGLOBIN GLYCOSYLATED A1C: CPT

## 2024-07-26 PROCEDURE — 83735 ASSAY OF MAGNESIUM: CPT

## 2024-07-26 PROCEDURE — 80053 COMPREHEN METABOLIC PANEL: CPT

## 2024-07-26 PROCEDURE — 80061 LIPID PANEL: CPT

## 2024-07-26 PROCEDURE — 36415 COLL VENOUS BLD VENIPUNCTURE: CPT

## 2024-07-26 PROCEDURE — 85025 COMPLETE CBC W/AUTO DIFF WBC: CPT

## 2024-08-01 ENCOUNTER — TELEPHONE (OUTPATIENT)
Age: 79
End: 2024-08-01

## 2024-08-01 NOTE — TELEPHONE ENCOUNTER
----- Message from Erika DALEY sent at 7/30/2024  5:01 PM EDT -----  Can you try to call her again to schedule sooner appointment, please.  ----- Message -----  From: Mary Anne Morales  Sent: 7/29/2024   7:53 AM EDT  To: [facility] Internal [facility] Clerical    LM for pt to call back to schedule apt .  ----- Message -----  From: Sanford Galeana MD  Sent: 7/27/2024   5:29 AM EDT  To: [facility] Clinical    Needs sooner f/u, 1-2 wks

## 2024-08-08 ENCOUNTER — OFFICE VISIT (OUTPATIENT)
Age: 79
End: 2024-08-08
Payer: COMMERCIAL

## 2024-08-08 VITALS
HEART RATE: 80 BPM | BODY MASS INDEX: 30.56 KG/M2 | TEMPERATURE: 97.8 F | SYSTOLIC BLOOD PRESSURE: 120 MMHG | OXYGEN SATURATION: 98 % | HEIGHT: 64 IN | DIASTOLIC BLOOD PRESSURE: 74 MMHG | WEIGHT: 179 LBS

## 2024-08-08 DIAGNOSIS — E03.9 ACQUIRED HYPOTHYROIDISM: ICD-10-CM

## 2024-08-08 DIAGNOSIS — E78.2 MIXED HYPERLIPIDEMIA: ICD-10-CM

## 2024-08-08 DIAGNOSIS — N18.30 STAGE 3 CHRONIC KIDNEY DISEASE, UNSPECIFIED WHETHER STAGE 3A OR 3B CKD (HCC): ICD-10-CM

## 2024-08-08 DIAGNOSIS — I10 PRIMARY HYPERTENSION: ICD-10-CM

## 2024-08-08 DIAGNOSIS — F32.9 REACTIVE DEPRESSION: ICD-10-CM

## 2024-08-08 DIAGNOSIS — E11.9 TYPE 2 DIABETES MELLITUS WITHOUT COMPLICATION, WITHOUT LONG-TERM CURRENT USE OF INSULIN (HCC): Primary | ICD-10-CM

## 2024-08-08 PROCEDURE — 99214 OFFICE O/P EST MOD 30 MIN: CPT

## 2024-08-08 PROCEDURE — G2211 COMPLEX E/M VISIT ADD ON: HCPCS | Performed by: INTERNAL MEDICINE

## 2024-08-08 RX ORDER — BLOOD-GLUCOSE METER
KIT MISCELLANEOUS
Qty: 1 KIT | Refills: 0 | Status: SHIPPED | OUTPATIENT
Start: 2024-08-08

## 2024-08-08 RX ORDER — BLOOD SUGAR DIAGNOSTIC
STRIP MISCELLANEOUS
Qty: 200 EACH | Refills: 3 | Status: SHIPPED | OUTPATIENT
Start: 2024-08-08

## 2024-08-08 RX ORDER — LEVOTHYROXINE SODIUM 0.1 MG/1
TABLET ORAL
Qty: 90 TABLET | Refills: 3 | Status: SHIPPED | OUTPATIENT
Start: 2024-08-08

## 2024-08-08 RX ORDER — ESCITALOPRAM OXALATE 10 MG/1
10 TABLET ORAL DAILY
Qty: 45 TABLET | Refills: 7 | Status: SHIPPED | OUTPATIENT
Start: 2024-08-08

## 2024-08-08 RX ORDER — METFORMIN HYDROCHLORIDE 500 MG/1
500 TABLET, EXTENDED RELEASE ORAL 2 TIMES DAILY WITH MEALS
Qty: 360 TABLET | Refills: 1 | Status: SHIPPED | OUTPATIENT
Start: 2024-08-08

## 2024-08-08 RX ORDER — LANCETS 33 GAUGE
EACH MISCELLANEOUS
Qty: 200 EACH | Refills: 3 | Status: SHIPPED | OUTPATIENT
Start: 2024-08-08

## 2024-08-08 NOTE — PROGRESS NOTES
deAssessment/Plan:       Diagnoses and all orders for this visit:    Type 2 diabetes mellitus without complication, without long-term current use of insulin ([facility])  -     Empagliflozin (Jardiance) 10 MG TABS tablet; Take 1 tablet (10 mg total) by mouth in the morning  -     Blood Glucose Monitoring Suppl (OneTouch Verio Reflect) w/Device KIT; Check blood sugars twice daily. Please substitute with appropriate alternative as covered by patient's insurance. Dx: E11.65  -     glucose blood (OneTouch Verio) test strip; Check blood sugars twice daily. Please substitute with appropriate alternative as covered by patient's insurance. Dx: E11.65  -     OneTouch Delica Lancets 33G MISC; Check blood sugars twice daily. Please substitute with appropriate alternative as covered by patient's insurance. Dx: E11.65  -     Basic metabolic panel; Future    Primary hypertension    Acquired hypothyroidism    Stage 3 chronic kidney disease, unspecified whether stage 3a or 3b CKD ([facility])    Reactive depression    Mixed hyperlipidemia          Patient Instructions   Diabetes- Can continue Metformin. Will start her on Jardiance 10 mg one tablet once daily. Advised on the side effects of increased risk of UTI.   Depression- Has alarming symptoms of depression. Discussed the need of therapist but she doesn't prefer one now. Recommended exercise. Explained in details the importance of restarting Lexapro 10 mg once daily as she had good response to the medication earlier.   Hypothyroidism- Given skipped doses pick a day and take two tablets on one day of the week and the rest of the week take one tablet only.   Will get basic metabolic panel within 1 month.   Please follow up within 1 month or sooner if needed.   Patient Education     Type 2 diabetes   The Basics   Written by the doctors and editors at [facility]   What is type 2 diabetes? -- This is a disorder that disrupts the way the body uses sugar. It is sometimes called type 2 diabetes  "mellitus.  All of the cells in the body need sugar to work normally. Sugar gets into the cells with the help of a hormone called insulin. Insulin is made by the pancreas, an organ in the belly. If there is not enough insulin, or if cells in the body don't respond normally to insulin, sugar builds up in the blood. That is what happens to people with diabetes.  There are 2 different types of diabetes:   In type 1 diabetes, the pancreas makes little or no insulin.   In type 2 diabetes, the pancreas still makes some insulin, but the cells in the body stop responding normally. Eventually, the pancreas cannot make enough insulin to keep up.  Having excess body weight or obesity increases a person's risk of developing type 2 diabetes. But people without excess body weight can get diabetes, too.  What are the symptoms of type 2 diabetes? -- Type 2 diabetes usually causes no symptoms. When symptoms do happen, they include:   Needing to urinate often   Intense thirst   Blurry vision  Can diabetes lead to other health problems? -- Yes. Type 2 diabetes might not make you feel sick. But if it is not managed, it can lead to serious problems over time, such as:   Heart attacks   Strokes   Kidney disease   Vision problems (or even blindness)   Pain or loss of feeling in the hands and feet   Needing to have fingers, toes, or other body parts removed (amputated)  How do I know if I have type 2 diabetes? -- Your doctor or nurse can do a blood test. There are 2 tests that can be used for this. Both involve measuring the amount of sugar in your blood, called your \"blood sugar\" or \"blood glucose\":   One of the tests measures your blood sugar at the time the blood sample is taken. This test is done in the morning. You can't eat or drink anything except water for at least 8 hours before the test.   The other test shows what your average blood sugar has been for the past 2 to 3 months. This blood test is called \"hemoglobin A1C\" or just " "\"A1C.\" It can be checked at any time of the day, even if you have recently eaten.  How is type 2 diabetes treated? -- The goals of treatment are to manage your blood sugar and lower the risk of future problems that can happen in people with diabetes.  Treatment might include:   Lifestyle changes - This is an important part of managing diabetes. It includes eating healthy foods and getting plenty of physical activity.   Medicines - There are a few medicines that help lower blood sugar. Some people need to take pills that help the body make more insulin or that help insulin do its job. Others need insulin shots.  Depending on what medicines you take, you might need to check your blood sugar regularly at home. But not everyone with type 2 diabetes needs to do this. Your doctor or nurse will tell you if you should be checking your blood sugar, and when and how to do this.  Sometimes, people with type 2 diabetes also need medicines to help prevent problems caused by the disease. For instance, medicines used to lower blood pressure can reduce the chances of a heart attack or stroke.   General medical care - It's also important to take care of other areas of your health. This includes watching your blood pressure and cholesterol levels. You should also get certain vaccines, such as vaccines to protect against the flu and coronavirus disease 2019 (\"COVID-19\"). Some people also need a vaccine to prevent pneumonia.  Can type 2 diabetes be prevented? -- Yes. To lower your chances of getting type 2 diabetes, the most important thing you can do is eat a healthy diet and get plenty of physical activity. This can help you lose weight if you are overweight. But eating well and being active are also good for your overall health. Even gentle activity, like walking, has benefits.  If you smoke, quitting can also lower your risk of type 2 diabetes. Quitting smoking can be difficult, but your doctor or nurse can help.  All topics are " updated as new evidence becomes available and our peer review process is complete.  This topic retrieved from [employer] on: Apr 24, 2024.  Topic 19156 Version 23.0  Release: 32.3.2 - C32.113  © 2024 UpToDate, Inc. and/or its affiliates. All rights reserved.  Consumer Information Use and Disclaimer   Disclaimer: This generalized information is a limited summary of diagnosis, treatment, and/or medication information. It is not meant to be comprehensive and should be used as a tool to help the user understand and/or assess potential diagnostic and treatment options. It does NOT include all information about conditions, treatments, medications, side effects, or risks that may apply to a specific patient. It is not intended to be medical advice or a substitute for the medical advice, diagnosis, or treatment of a health care provider based on the health care provider's examination and assessment of a patient's specific and unique circumstances. Patients must speak with a health care provider for complete information about their health, medical questions, and treatment options, including any risks or benefits regarding use of medications. This information does not endorse any treatments or medications as safe, effective, or approved for treating a specific patient. UpToDate, Inc. and its affiliates disclaim any warranty or liability relating to this information or the use thereof.The use of this information is governed by the Terms of Use, available at https://www.[employer].com/en/know/clinical-effectiveness-terms. 2024© UpToDate, Inc. and its affiliates and/or licensors. All rights reserved.  Copyright   © 2024 UpToDate, Inc. and/or its affiliates. All rights reserved.       Subjective:      Patient ID: Delmi Epps is a 79 y.o. female.    Patient is here for follow up and labs review.     Diabetes- Compliant with Metformin. Stopped checking blood sugars. Eats carbs and fatty food. No exercise.   Hypothyroidism-  Skipped three levothyroxine doses within the past month. No cold intolerance. Constipation improved with Metamucil.   Depression- Sleeps 5 hours daily. Goes to bed late around 2 AM. Sad, but not suicidal given her Yazidi Muslim beliefs.Two children lives in [place]. Keep herself busy around the house and with her cats. Feeling fatigued.   Hypertension- No home Bps.   HLD- tolerating statins.         The following portions of the patient's history were reviewed and updated as appropriate:   She has a past medical history of Arthritis, Colon polyp, Diabetes mellitus (HCC), Disease of thyroid gland, Elbow fracture, Foot fracture, left, GERD (gastroesophageal reflux disease), Hypertension, Irritable bowel syndrome, and Urinary tract infection.,  does not have any pertinent problems on file.,   has a past surgical history that includes Appendectomy; Cholecystectomy; Dilation and curettage of uterus (1995); TONSILLECTOMY; Tonsillectomy; pr colpopexy vaginal extraperitoneal approach (N/A, 7/29/2019); pr cmbnd anterpost colporraphy w/cysto (N/A, 7/29/2019); pr sling operation stress incontinence (N/A, 7/29/2019); and pr cystourethroscopy (N/A, 7/29/2019).,  family history includes Asthma in her mother; Breast cancer in her paternal aunt; Colon cancer in her mother; Heart attack in her maternal grandfather; Heart failure in her father; Stomach cancer in her paternal grandmother; Ulcers in her father.,   reports that she has never smoked. She has never used smokeless tobacco. She reports current alcohol use. She reports that she does not use drugs.,  is allergic to penicillins..  Current Outpatient Medications   Medication Sig Dispense Refill    acetaminophen (TYLENOL) 325 mg tablet Take 2 tablets (650 mg total) by mouth every 6 (six) hours as needed for mild pain 30 tablet 0    amLODIPine-benazepril (LOTREL 5-10) 5-10 MG per capsule TAKE 1 CAPSULE BY MOUTH EVERY DAY 90 capsule 3    atorvastatin (LIPITOR) 10 mg  tablet TAKE 1 TABLET BY MOUTH EVERY DAY 90 tablet 3    Lancets (ONETOUCH DELICA PLUS [id number]) MISC TEST DAILY 100 each 2    latanoprost (XALATAN) 0.005 % ophthalmic solution INSTILL 1 DROP INTO BOTH EYES AT BEDTIME      levothyroxine 100 mcg tablet TAKE 1 TABLET BY MOUTH EVERY DAY 90 tablet 3    metFORMIN (GLUCOPHAGE-XR) 500 mg 24 hr tablet TAKE 4 TABLETS BY MOUTH EVERY  tablet 1    Multiple Vitamins-Minerals (CENTRUM SILVER) CHEW Chew 1 tablet daily      neomycin-colistin-hydrocortisone-thonzonium (CORTISPORIN TC) 0.33-0.3-1-0.05 % otic suspension Administer 3 drops into both ears 4 (four) times a day 10 mL 0    neomycin-polymyxin-hydrocortisone (CORTISPORIN) 0.35%-10,000 units/mL-1% otic suspension Administer 4 drops to the right ear 4 (four) times a day 10 mL 0    ONE TOUCH ULTRA TEST test strip 1 EACH BY OTHER ROUTE DAILY USE AS INSTRUCTED 100 each 2    OneTouch Delica Lancets 30G MISC TEST 1 DAILY AS INSTRUCTED 100 each 2    spironolactone (ALDACTONE) 25 mg tablet TAKE 1 TABLET BY MOUTH EVERY DAY 90 tablet 3    Blood Glucose Monitoring Suppl (OneTouch Verio Reflect) w/Device KIT Check blood sugars twice daily. Please substitute with appropriate alternative as covered by patient's insurance. Dx: E11.65 1 kit 0    Empagliflozin (Jardiance) 10 MG TABS tablet Take 1 tablet (10 mg total) by mouth in the morning 90 tablet 1    escitalopram (LEXAPRO) 10 mg tablet TAKE 1/2 TABLET BY MOUTH DAILY (Patient not taking: Reported on 7/25/2024) 45 tablet 7    glucose blood (OneTouch Verio) test strip Check blood sugars twice daily. Please substitute with appropriate alternative as covered by patient's insurance. Dx: E11.65 200 each 3    OneTouch Delica Lancets 33G MISC Check blood sugars twice daily. Please substitute with appropriate alternative as covered by patient's insurance. Dx: E11.65 200 each 3    valACYclovir (VALTREX) 1,000 mg tablet Take 1 tablet (1,000 mg total) by mouth as needed (as needed) 14 tablet 0  "    No current facility-administered medications for this visit.       Review of Systems   Constitutional:  Positive for fatigue. Negative for chills and fever.   HENT:  Negative for ear pain and sore throat.    Eyes:  Negative for pain and visual disturbance.   Respiratory:  Negative for cough and shortness of breath.    Cardiovascular:  Negative for chest pain and palpitations.   Gastrointestinal:  Negative for abdominal pain and vomiting.   Genitourinary:  Negative for dysuria and hematuria.   Musculoskeletal:  Negative for arthralgias and back pain.   Skin:  Negative for color change and rash.   Neurological:  Negative for seizures and syncope.   Psychiatric/Behavioral:  Positive for dysphoric mood and sleep disturbance. Negative for suicidal ideas.    All other systems reviewed and are negative.        Objective:  Vitals:    08/08/24 1148   BP: 120/74   BP Location: Left arm   Patient Position: Sitting   Cuff Size: Standard   Pulse: 80   Temp: 97.8 °F (36.6 °C)   TempSrc: Tympanic   SpO2: 98%   Weight: 81.2 kg (179 lb)   Height: 5' 4\" (1.626 m)     Body mass index is 30.73 kg/m².     Physical Exam      "

## 2024-08-08 NOTE — PATIENT INSTRUCTIONS
Diabetes- Can continue Metformin. Will start her on Jardiance 10 mg one tablet once daily. Advised on the side effects of increased risk of UTI.   Depression- Has alarming symptoms of depression. Discussed the need of therapist but she doesn't prefer one now. Recommended exercise. Explained in details the importance of restarting Lexapro 10 mg once daily as she had good response to the medication earlier.   Hypothyroidism- Given skipped doses pick a day and take two tablets on one day of the week and the rest of the week take one tablet only.   Will get basic metabolic panel within 1 month.   Please follow up within 1 month or sooner if needed.   Patient Education     Type 2 diabetes   The Basics   Written by the doctors and editors at [facility]   What is type 2 diabetes? -- This is a disorder that disrupts the way the body uses sugar. It is sometimes called type 2 diabetes mellitus.  All of the cells in the body need sugar to work normally. Sugar gets into the cells with the help of a hormone called insulin. Insulin is made by the pancreas, an organ in the belly. If there is not enough insulin, or if cells in the body don't respond normally to insulin, sugar builds up in the blood. That is what happens to people with diabetes.  There are 2 different types of diabetes:   In type 1 diabetes, the pancreas makes little or no insulin.   In type 2 diabetes, the pancreas still makes some insulin, but the cells in the body stop responding normally. Eventually, the pancreas cannot make enough insulin to keep up.  Having excess body weight or obesity increases a person's risk of developing type 2 diabetes. But people without excess body weight can get diabetes, too.  What are the symptoms of type 2 diabetes? -- Type 2 diabetes usually causes no symptoms. When symptoms do happen, they include:   Needing to urinate often   Intense thirst   Blurry vision  Can diabetes lead to other health problems? -- Yes. Type 2 diabetes might  "not make you feel sick. But if it is not managed, it can lead to serious problems over time, such as:   Heart attacks   Strokes   Kidney disease   Vision problems (or even blindness)   Pain or loss of feeling in the hands and feet   Needing to have fingers, toes, or other body parts removed (amputated)  How do I know if I have type 2 diabetes? -- Your doctor or nurse can do a blood test. There are 2 tests that can be used for this. Both involve measuring the amount of sugar in your blood, called your \"blood sugar\" or \"blood glucose\":   One of the tests measures your blood sugar at the time the blood sample is taken. This test is done in the morning. You can't eat or drink anything except water for at least 8 hours before the test.   The other test shows what your average blood sugar has been for the past 2 to 3 months. This blood test is called \"hemoglobin A1C\" or just \"A1C.\" It can be checked at any time of the day, even if you have recently eaten.  How is type 2 diabetes treated? -- The goals of treatment are to manage your blood sugar and lower the risk of future problems that can happen in people with diabetes.  Treatment might include:   Lifestyle changes - This is an important part of managing diabetes. It includes eating healthy foods and getting plenty of physical activity.   Medicines - There are a few medicines that help lower blood sugar. Some people need to take pills that help the body make more insulin or that help insulin do its job. Others need insulin shots.  Depending on what medicines you take, you might need to check your blood sugar regularly at home. But not everyone with type 2 diabetes needs to do this. Your doctor or nurse will tell you if you should be checking your blood sugar, and when and how to do this.  Sometimes, people with type 2 diabetes also need medicines to help prevent problems caused by the disease. For instance, medicines used to lower blood pressure can reduce the chances of " "a heart attack or stroke.   General medical care - It's also important to take care of other areas of your health. This includes watching your blood pressure and cholesterol levels. You should also get certain vaccines, such as vaccines to protect against the flu and coronavirus disease 2019 (\"COVID-19\"). Some people also need a vaccine to prevent pneumonia.  Can type 2 diabetes be prevented? -- Yes. To lower your chances of getting type 2 diabetes, the most important thing you can do is eat a healthy diet and get plenty of physical activity. This can help you lose weight if you are overweight. But eating well and being active are also good for your overall health. Even gentle activity, like walking, has benefits.  If you smoke, quitting can also lower your risk of type 2 diabetes. Quitting smoking can be difficult, but your doctor or nurse can help.  All topics are updated as new evidence becomes available and our peer review process is complete.  This topic retrieved from [employer] on: Apr 24, 2024.  Topic 32293 Version 23.0  Release: 32.3.2 - C32.113  © 2024 UpToDate, Inc. and/or its affiliates. All rights reserved.  Consumer Information Use and Disclaimer   Disclaimer: This generalized information is a limited summary of diagnosis, treatment, and/or medication information. It is not meant to be comprehensive and should be used as a tool to help the user understand and/or assess potential diagnostic and treatment options. It does NOT include all information about conditions, treatments, medications, side effects, or risks that may apply to a specific patient. It is not intended to be medical advice or a substitute for the medical advice, diagnosis, or treatment of a health care provider based on the health care provider's examination and assessment of a patient's specific and unique circumstances. Patients must speak with a health care provider for complete information about their health, medical questions, and " treatment options, including any risks or benefits regarding use of medications. This information does not endorse any treatments or medications as safe, effective, or approved for treating a specific patient. UpToDate, Inc. and its affiliates disclaim any warranty or liability relating to this information or the use thereof.The use of this information is governed by the Terms of Use, available at https://www.[employer].com/en/know/clinical-effectiveness-terms. 2024© UpToDate, Inc. and its affiliates and/or licensors. All rights reserved.  Copyright   © 2024 UpToDate, Inc. and/or its affiliates. All rights reserved.

## 2024-08-16 ENCOUNTER — PREP FOR PROCEDURE (OUTPATIENT)
Age: 79
End: 2024-08-16

## 2024-08-16 ENCOUNTER — OFFICE VISIT (OUTPATIENT)
Age: 79
End: 2024-08-16
Payer: COMMERCIAL

## 2024-08-16 VITALS
SYSTOLIC BLOOD PRESSURE: 136 MMHG | RESPIRATION RATE: 18 BRPM | BODY MASS INDEX: 30.11 KG/M2 | HEART RATE: 91 BPM | WEIGHT: 176.4 LBS | DIASTOLIC BLOOD PRESSURE: 58 MMHG | HEIGHT: 64 IN | OXYGEN SATURATION: 97 %

## 2024-08-16 DIAGNOSIS — R19.8 ALTERNATING CONSTIPATION AND DIARRHEA: Primary | ICD-10-CM

## 2024-08-16 DIAGNOSIS — Z86.010 HISTORY OF COLON POLYPS: ICD-10-CM

## 2024-08-16 DIAGNOSIS — Z12.11 SCREENING FOR COLON CANCER: ICD-10-CM

## 2024-08-16 PROCEDURE — 99203 OFFICE O/P NEW LOW 30 MIN: CPT | Performed by: INTERNAL MEDICINE

## 2024-08-16 NOTE — LETTER
August 16, 2024     Sanford Galeana MD  [address]  [address]  [facility] [address]    Patient: Delmi Epps   YOB: 1945   Date of Visit: 8/16/2024       Dear Dr. Galeana:    Thank you for referring Delmi Epps to me for evaluation. Below are my notes for this consultation.    If you have questions, please do not hesitate to call me. I look forward to following your patient along with you.         Sincerely,        Clyde Cabrales MD        CC: No Recipients    Clyde Cabrales MD  8/16/2024  8:13 AM  Incomplete  [facility]    Dear Mario,    I had the pleasure of seeing your patient Delmi Epps in the office today and I thank you for this kind referral.       Chief Complaint: Bowel issues      HPI:  Delmi Epps is a 79 y.o. female who presents with history of colon polyps.  Her last colonoscopy was 5 years ago.  Patient has alternating constipation and diarrhea.  She does have a history of irritable bowel.  She has no alarm symptomatology.  No bleeding.  No pain.  No weight loss.  No fever or chills.  No melena.  She has no other associated symptomatology.  No definitive exacerbating or remitting factors for this..      Review of Systems:   Constitutional: No fever or chills, feels well, no tiredness, no recent weight gain or weight loss.   HENT: No complaints of earache, no hearing loss, no nosebleeds, no nasal discharge, no sore throat, no hoarseness.    Eyes: No complaints of eye pain, no red eyes, no discharge from eyes, no itchy eyes.  Cardiovascular: No complaints of slow heart rate, no fast heart rate, no chest pain, no palpitations, no leg claudication, no lower extremity edema.   Respiratory: No complaints of shortness of breath, no wheezing, no cough, positive occasional SOB on exertion, no orthopnea.   Gastrointestinal: As noted in HPI  Genitourinary: No complaints of dysuria, positive occasional incontinence, no hesitancy, no nocturia.    Musculoskeletal: Positive arthralgia, no myalgias, no joint swelling or stiffness, no limb pain or swelling.   Neurological: No complaints of headache, no confusion, no convulsions, no numbness or tingling, no dizziness or fainting, no limb weakness, no difficulty walking.    Skin: No complaints of skin rash or skin lesions, no itching, no skin wound, no dry skin.    Hematological/Lymphatic: No complaints of swollen glands, does not bleed easy.   Allergic/Immunologic: No immunocompromised state.  Endocrine:  No complaints of polyuria, no polydipsia.   Psychiatric/Behavioral: is not suicidal, no sleep disturbances, no anxiety or depression, no change in personality, no emotional problems.       Historical Information  Past Medical History:   Diagnosis Date   • Arthritis    • Colon polyp    • Diabetes mellitus (HCC)    • Disease of thyroid gland    • Elbow fracture    • Foot fracture, left    • GERD (gastroesophageal reflux disease)     occasional   • Hypertension    • Irritable bowel syndrome    • Urinary tract infection      Past Surgical History:   Procedure Laterality Date   • APPENDECTOMY     • CHOLECYSTECTOMY     • DILATION AND CURETTAGE OF UTERUS  1995   • ND CMBND ANTERPOST COLPORRAPHY W/CYSTO N/A 7/29/2019    Procedure: A&P COLPORRHAPHY; GRAFT;  Surgeon: Danielito Sanford MD;  [facility];  Service: UroGynecology          • ND COLPOPEXY VAGINAL EXTRAPERITONEAL APPROACH N/A 7/29/2019    Procedure: VE COLPOPEXY;  Surgeon: Danielito Sanford MD;  [facility];  Service: UroGynecology          • ND CYSTOURETHROSCOPY N/A 7/29/2019    Procedure: CYSTOSCOPY;  Surgeon: Danielito Sanford MD;  [facility];  Service: UroGynecology          • ND SLING OPERATION STRESS INCONTINENCE N/A 7/29/2019    Procedure: PUBOVAGINAL SLING;  Surgeon: Danielito Sanford MD;  [facility];  Service: UroGynecology          • TONSILLECTOMY     • TONSILLECTOMY       Social History  Social History     Substance  "and Sexual Activity   Alcohol Use Yes    Comment: rare, maybe once a year     Social History     Substance and Sexual Activity   Drug Use No     Social History     Tobacco Use   Smoking Status Never   Smokeless Tobacco Never     Family History   Problem Relation Age of Onset   • Colon cancer Mother         Bowel   • Asthma Mother    • Heart failure Father         Congestive   • Ulcers Father    • Heart attack Maternal Grandfather         Myocardial infarction   • Stomach cancer Paternal Grandmother    • Breast cancer Paternal Aunt    • Ovarian cancer Neg Hx          Current Medications: has a current medication list which includes the following prescription(s): acetaminophen, amlodipine-benazepril, atorvastatin, onetouch verio reflect, empagliflozin, escitalopram, onetouch verio, onetouch delica plus [id number], latanoprost, levothyroxine, metformin, centrum silver, neomycin-colistin-hydrocortisone-thonzonium, neomycin-polymyxin-hydrocortisone, one touch ultra test, onetouch delica lancets 30g, onetouch delica lancets 33g, spironolactone, and valacyclovir.       Vital Signs: /58   Pulse 91   Resp 18   Ht 5' 4\" (1.626 m)   Wt 80 kg (176 lb 6.4 oz)   SpO2 97%   BMI 30.28 kg/m²     Physical Exam:   Constitutional  General Appearance: No acute distress, well appearing and well nourished  Head  Normocephalic  Eyes  Conjunctivae and lids: No swelling, erythema, or discharge.    Pupils and irises: Equal, round and reactive to light.   Ears, Nose, Mouth, and Throat  External inspection of ears and nose: Normal  Nasal mucosa, septum and turbinates: Normal without edema or erythema/   Oropharynx: Normal with no erythema, edema, exudate or lesions.   Neck  Normal range of motion. Neck supple.   Cardiovascular  Auscultation of the heart: Normal rate and rhythm, normal S1 and S2 without murmurs.  Examination of the extremities for edema and/or varicosities: Normal  Pulmonary/Chest  Respiratory effort: No increased " work of breathing or signs of respiratory distress.   Auscultation of lungs: Clear to auscultation, equal breath sounds bilaterally, no wheezes, rales, no rhonchi.   Abdomen  Abdomen: Non-tender, no masses.   Liver and spleen: No hepatomegaly or splenomegaly.   Musculoskeletal  Gait and station: normal.  Digits and Nails: normal without clubbing or cyanosis.  Inspection/palpation of joints, bones, and muscles: Normal  Neurological  No nystagmus or asterixis.   Skin  Skin and subcutaneous tissue: Normal without rashes or lesions.   Lymphatic  Palpation of the lymph nodes in neck: No lymphadenopathy.   Psychiatric  Orientation to person, place and time: Normal.  Mood and affect: Normal.         Labs:   Lab Results   Component Value Date    ALT 25 07/26/2024    AST 22 07/26/2024    BUN 19 07/26/2024    CALCIUM 9.5 07/26/2024     07/26/2024    CHOL 126 01/04/2016    CO2 27 07/26/2024    CREATININE 0.96 07/26/2024    HDL 43 (L) 07/26/2024    HCT 39.0 07/26/2024    HGB 13.2 07/26/2024    HGBA1C 8.7 (H) 07/26/2024    MG 1.3 (L) 07/26/2024    PHOS 3.2 07/26/2024     07/26/2024    K 4.5 07/26/2024     01/04/2016    TRIG 236 (H) 07/26/2024    WBC 9.31 07/26/2024         X-Rays & Procedures:   No orders to display         ______________________________________________________________________      Assessment & Plan:      Diagnoses and all orders for this visit:    Alternating constipation and diarrhea  -     Colonoscopy; Future    Screening for colon cancer  -     Ambulatory Referral to Gastroenterology    History of colon polyps  -     Colonoscopy; Future            I have taken the liberty of scheduling the patient for colonoscopy.  I will be happy to inform you of her results and further recommendations.  I like to thank you for allowing me to participate in her care.    I obtained informed consent from the patient. The risks/benefits/alternatives of the procedure were discussed with the patient. Risks  included, but not limited to, infection, bleeding, perforation, injury to organs in the abdomen, missed lesion and incomplete procedure were discussed. Patient was agreeable and electronic consent was signed.  Sanford        With warmest regards,    Clyde Cabrales MD, FACG

## 2024-08-16 NOTE — PROGRESS NOTES
[facility]    Dear Mario,    I had the pleasure of seeing your patient Delmi Epps in the office today and I thank you for this kind referral.       Chief Complaint: Bowel issues      HPI:  Delmi Epps is a 79 y.o. female who presents with history of colon polyps.  Her last colonoscopy was 5 years ago.  Patient has alternating constipation and diarrhea.  She does have a history of irritable bowel.  She has no alarm symptomatology.  No bleeding.  No pain.  No weight loss.  No fever or chills.  No melena.  She has no other associated symptomatology.  No definitive exacerbating or remitting factors for this..      Review of Systems:   Constitutional: No fever or chills, feels well, no tiredness, no recent weight gain or weight loss.   HENT: No complaints of earache, no hearing loss, no nosebleeds, no nasal discharge, no sore throat, no hoarseness.    Eyes: No complaints of eye pain, no red eyes, no discharge from eyes, no itchy eyes.  Cardiovascular: No complaints of slow heart rate, no fast heart rate, no chest pain, no palpitations, no leg claudication, no lower extremity edema.   Respiratory: No complaints of shortness of breath, no wheezing, no cough, positive occasional SOB on exertion, no orthopnea.   Gastrointestinal: As noted in HPI  Genitourinary: No complaints of dysuria, positive occasional incontinence, no hesitancy, no nocturia.   Musculoskeletal: Positive arthralgia, no myalgias, no joint swelling or stiffness, no limb pain or swelling.   Neurological: No complaints of headache, no confusion, no convulsions, no numbness or tingling, no dizziness or fainting, no limb weakness, no difficulty walking.    Skin: No complaints of skin rash or skin lesions, no itching, no skin wound, no dry skin.    Hematological/Lymphatic: No complaints of swollen glands, does not bleed easy.   Allergic/Immunologic: No immunocompromised state.  Endocrine:  No complaints of polyuria, no  polydipsia.   Psychiatric/Behavioral: is not suicidal, no sleep disturbances, no anxiety or depression, no change in personality, no emotional problems.       Historical Information   Past Medical History:   Diagnosis Date    Arthritis     Colon polyp     Diabetes mellitus (HCC)     Disease of thyroid gland     Elbow fracture     Foot fracture, left     GERD (gastroesophageal reflux disease)     occasional    Hypertension     Irritable bowel syndrome     Urinary tract infection      Past Surgical History:   Procedure Laterality Date    APPENDECTOMY      CHOLECYSTECTOMY      DILATION AND CURETTAGE OF UTERUS  1995    SD CMBND ANTERPOST COLPORRAPHY W/CYSTO N/A 7/29/2019    Procedure: A&P COLPORRHAPHY; GRAFT;  Surgeon: Danielito Sanford MD;  [facility];  Service: UroGynecology           SD COLPOPEXY VAGINAL EXTRAPERITONEAL APPROACH N/A 7/29/2019    Procedure: VE COLPOPEXY;  Surgeon: Danielito Sanford MD;  [facility];  Service: UroGynecology           SD CYSTOURETHROSCOPY N/A 7/29/2019    Procedure: CYSTOSCOPY;  Surgeon: Danielito Sanford MD;  [facility];  Service: UroGynecology           SD SLING OPERATION STRESS INCONTINENCE N/A 7/29/2019    Procedure: PUBOVAGINAL SLING;  Surgeon: Danielito Sanford MD;  [facility];  Service: UroGynecology           TONSILLECTOMY      TONSILLECTOMY       Social History   Social History     Substance and Sexual Activity   Alcohol Use Yes    Comment: rare, maybe once a year     Social History     Substance and Sexual Activity   Drug Use No     Social History     Tobacco Use   Smoking Status Never   Smokeless Tobacco Never     Family History   Problem Relation Age of Onset    Colon cancer Mother         Bowel    Asthma Mother     Heart failure Father         Congestive    Ulcers Father     Heart attack Maternal Grandfather         Myocardial infarction    Stomach cancer Paternal Grandmother     Breast cancer Paternal Aunt     Ovarian cancer Neg Hx   "        Current Medications: has a current medication list which includes the following prescription(s): acetaminophen, amlodipine-benazepril, atorvastatin, onetouch verio reflect, empagliflozin, escitalopram, onetouch verio, onetouch delica plus [id number], latanoprost, levothyroxine, metformin, centrum silver, neomycin-colistin-hydrocortisone-thonzonium, neomycin-polymyxin-hydrocortisone, one touch ultra test, onetouch delica lancets 30g, onetouch delica lancets 33g, spironolactone, and valacyclovir.       Vital Signs: /58   Pulse 91   Resp 18   Ht 5' 4\" (1.626 m)   Wt 80 kg (176 lb 6.4 oz)   SpO2 97%   BMI 30.28 kg/m²     Physical Exam:   Constitutional  General Appearance: No acute distress, well appearing and well nourished  Head  Normocephalic  Eyes  Conjunctivae and lids: No swelling, erythema, or discharge.    Pupils and irises: Equal, round and reactive to light.   Ears, Nose, Mouth, and Throat  External inspection of ears and nose: Normal  Nasal mucosa, septum and turbinates: Normal without edema or erythema/   Oropharynx: Normal with no erythema, edema, exudate or lesions.   Neck  Normal range of motion. Neck supple.   Cardiovascular  Auscultation of the heart: Normal rate and rhythm, normal S1 and S2 without murmurs.  Examination of the extremities for edema and/or varicosities: Normal  Pulmonary/Chest  Respiratory effort: No increased work of breathing or signs of respiratory distress.   Auscultation of lungs: Clear to auscultation, equal breath sounds bilaterally, no wheezes, rales, no rhonchi.   Abdomen  Abdomen: Non-tender, no masses.   Liver and spleen: No hepatomegaly or splenomegaly.   Musculoskeletal  Gait and station: normal.  Digits and Nails: normal without clubbing or cyanosis.  Inspection/palpation of joints, bones, and muscles: Normal  Neurological  No nystagmus or asterixis.   Skin  Skin and subcutaneous tissue: Normal without rashes or lesions.   Lymphatic  Palpation of the " lymph nodes in neck: No lymphadenopathy.   Psychiatric  Orientation to person, place and time: Normal.  Mood and affect: Normal.         Labs:   Lab Results   Component Value Date    ALT 25 07/26/2024    AST 22 07/26/2024    BUN 19 07/26/2024    CALCIUM 9.5 07/26/2024     07/26/2024    CHOL 126 01/04/2016    CO2 27 07/26/2024    CREATININE 0.96 07/26/2024    HDL 43 (L) 07/26/2024    HCT 39.0 07/26/2024    HGB 13.2 07/26/2024    HGBA1C 8.7 (H) 07/26/2024    MG 1.3 (L) 07/26/2024    PHOS 3.2 07/26/2024     07/26/2024    K 4.5 07/26/2024     01/04/2016    TRIG 236 (H) 07/26/2024    WBC 9.31 07/26/2024         X-Rays & Procedures:   No orders to display         ______________________________________________________________________      Assessment & Plan:      Diagnoses and all orders for this visit:    Alternating constipation and diarrhea  -     Colonoscopy; Future    Screening for colon cancer  -     Ambulatory Referral to Gastroenterology    History of colon polyps  -     Colonoscopy; Future            I have taken the liberty of scheduling the patient for colonoscopy.  I will be happy to inform you of her results and further recommendations.  I like to thank you for allowing me to participate in her care.    I obtained informed consent from the patient. The risks/benefits/alternatives of the procedure were discussed with the patient. Risks included, but not limited to, infection, bleeding, perforation, injury to organs in the abdomen, missed lesion and incomplete procedure were discussed. Patient was agreeable and electronic consent was signed.  Sanford        With warmest regards,    Clyde Cabrales MD, FACG

## 2024-08-16 NOTE — PATIENT INSTRUCTIONS
Scheduled date of colonoscopy (as of today): 8/29/24  Physician performing colonoscopy: Samra  Location of colonoscopy: [place]  Bowel prep reviewed with patient: Miralax  Instructions reviewed with patient by: Nataliia CAMPBELL  Clearances:

## 2024-08-16 NOTE — H&P (VIEW-ONLY)
[facility]    Dear Mario,    I had the pleasure of seeing your patient Delmi Epps in the office today and I thank you for this kind referral.       Chief Complaint: Bowel issues      HPI:  Delmi Epps is a 79 y.o. female who presents with history of colon polyps.  Her last colonoscopy was 5 years ago.  Patient has alternating constipation and diarrhea.  She does have a history of irritable bowel.  She has no alarm symptomatology.  No bleeding.  No pain.  No weight loss.  No fever or chills.  No melena.  She has no other associated symptomatology.  No definitive exacerbating or remitting factors for this..      Review of Systems:   Constitutional: No fever or chills, feels well, no tiredness, no recent weight gain or weight loss.   HENT: No complaints of earache, no hearing loss, no nosebleeds, no nasal discharge, no sore throat, no hoarseness.    Eyes: No complaints of eye pain, no red eyes, no discharge from eyes, no itchy eyes.  Cardiovascular: No complaints of slow heart rate, no fast heart rate, no chest pain, no palpitations, no leg claudication, no lower extremity edema.   Respiratory: No complaints of shortness of breath, no wheezing, no cough, positive occasional SOB on exertion, no orthopnea.   Gastrointestinal: As noted in HPI  Genitourinary: No complaints of dysuria, positive occasional incontinence, no hesitancy, no nocturia.   Musculoskeletal: Positive arthralgia, no myalgias, no joint swelling or stiffness, no limb pain or swelling.   Neurological: No complaints of headache, no confusion, no convulsions, no numbness or tingling, no dizziness or fainting, no limb weakness, no difficulty walking.    Skin: No complaints of skin rash or skin lesions, no itching, no skin wound, no dry skin.    Hematological/Lymphatic: No complaints of swollen glands, does not bleed easy.   Allergic/Immunologic: No immunocompromised state.  Endocrine:  No complaints of polyuria, no  polydipsia.   Psychiatric/Behavioral: is not suicidal, no sleep disturbances, no anxiety or depression, no change in personality, no emotional problems.       Historical Information   Past Medical History:   Diagnosis Date    Arthritis     Colon polyp     Diabetes mellitus (HCC)     Disease of thyroid gland     Elbow fracture     Foot fracture, left     GERD (gastroesophageal reflux disease)     occasional    Hypertension     Irritable bowel syndrome     Urinary tract infection      Past Surgical History:   Procedure Laterality Date    APPENDECTOMY      CHOLECYSTECTOMY      DILATION AND CURETTAGE OF UTERUS  1995    [facility] CMBND ANTERPOST COLPORRAPHY W/CYSTO N/A 7/29/2019    Procedure: A&P COLPORRHAPHY; GRAFT;  Surgeon: Danielito Sanford MD;  Location: [facility];  Service: UroGynecology           VA COLPOPEXY VAGINAL EXTRAPERITONEAL APPROACH N/A 7/29/2019    Procedure: VE COLPOPEXY;  Surgeon: Danielito Sanford MD;  Location: [facility];  Service: UroGynecology           VA CYSTOURETHROSCOPY N/A 7/29/2019    Procedure: CYSTOSCOPY;  Surgeon: Danielito Sanford MD;  Location: [facility];  Service: UroGynecology           VA SLING OPERATION STRESS INCONTINENCE N/A 7/29/2019    Procedure: PUBOVAGINAL SLING;  Surgeon: Danielito Sanford MD;  Location: [facility];  Service: UroGynecology           TONSILLECTOMY      TONSILLECTOMY       Social History   Social History     Substance and Sexual Activity   Alcohol Use Yes    Comment: rare, maybe once a year     Social History     Substance and Sexual Activity   Drug Use No     Social History     Tobacco Use   Smoking Status Never   Smokeless Tobacco Never     Family History   Problem Relation Age of Onset    Colon cancer Mother         Bowel    Asthma Mother     Heart failure Father         Congestive    Ulcers Father     Heart attack Maternal Grandfather         Myocardial infarction    Stomach cancer Paternal Grandmother     Breast cancer Paternal Aunt     Ovarian cancer Neg Hx   "        Current Medications: has a current medication list which includes the following prescription(s): acetaminophen, amlodipine-benazepril, atorvastatin, onetouch verio reflect, empagliflozin, escitalopram, onetouch verio, onetouch delica plus [id number], latanoprost, levothyroxine, metformin, centrum silver, neomycin-colistin-hydrocortisone-thonzonium, neomycin-polymyxin-hydrocortisone, one touch ultra test, onetouch delica lancets 30g, onetouch delica lancets 33g, spironolactone, and valacyclovir.       Vital Signs: /58   Pulse 91   Resp 18   Ht 5' 4\" (1.626 m)   Wt 80 kg (176 lb 6.4 oz)   SpO2 97%   BMI 30.28 kg/m²     Physical Exam:   Constitutional  General Appearance: No acute distress, well appearing and well nourished  Head  Normocephalic  Eyes  Conjunctivae and lids: No swelling, erythema, or discharge.    Pupils and irises: Equal, round and reactive to light.   Ears, Nose, Mouth, and Throat  External inspection of ears and nose: Normal  Nasal mucosa, septum and turbinates: Normal without edema or erythema/   Oropharynx: Normal with no erythema, edema, exudate or lesions.   Neck  Normal range of motion. Neck supple.   Cardiovascular  Auscultation of the heart: Normal rate and rhythm, normal S1 and S2 without murmurs.  Examination of the extremities for edema and/or varicosities: Normal  Pulmonary/Chest  Respiratory effort: No increased work of breathing or signs of respiratory distress.   Auscultation of lungs: Clear to auscultation, equal breath sounds bilaterally, no wheezes, rales, no rhonchi.   Abdomen  Abdomen: Non-tender, no masses.   Liver and spleen: No hepatomegaly or splenomegaly.   Musculoskeletal  Gait and station: normal.  Digits and Nails: normal without clubbing or cyanosis.  Inspection/palpation of joints, bones, and muscles: Normal  Neurological  No nystagmus or asterixis.   Skin  Skin and subcutaneous tissue: Normal without rashes or lesions.   Lymphatic  Palpation of the " lymph nodes in neck: No lymphadenopathy.   Psychiatric  Orientation to person, place and time: Normal.  Mood and affect: Normal.         Labs:   Lab Results   Component Value Date    ALT 25 07/26/2024    AST 22 07/26/2024    BUN 19 07/26/2024    CALCIUM 9.5 07/26/2024     07/26/2024    CHOL 126 01/04/2016    CO2 27 07/26/2024    CREATININE 0.96 07/26/2024    HDL 43 (L) 07/26/2024    HCT 39.0 07/26/2024    HGB 13.2 07/26/2024    HGBA1C 8.7 (H) 07/26/2024    MG 1.3 (L) 07/26/2024    PHOS 3.2 07/26/2024     07/26/2024    K 4.5 07/26/2024     01/04/2016    TRIG 236 (H) 07/26/2024    WBC 9.31 07/26/2024         X-Rays & Procedures:   No orders to display         ______________________________________________________________________      Assessment & Plan:      Diagnoses and all orders for this visit:    Alternating constipation and diarrhea  -     Colonoscopy; Future    Screening for colon cancer  -     Ambulatory Referral to Gastroenterology    History of colon polyps  -     Colonoscopy; Future            I have taken the liberty of scheduling the patient for colonoscopy.  I will be happy to inform you of her results and further recommendations.  I like to thank you for allowing me to participate in her care.    I obtained informed consent from the patient. The risks/benefits/alternatives of the procedure were discussed with the patient. Risks included, but not limited to, infection, bleeding, perforation, injury to organs in the abdomen, missed lesion and incomplete procedure were discussed. Patient was agreeable and electronic consent was signed.  Sanford        With warmest regards,    Clyde Cabrales MD, FACG

## 2024-08-29 ENCOUNTER — ANESTHESIA (OUTPATIENT)
Dept: GASTROENTEROLOGY | Facility: HOSPITAL | Age: 79
End: 2024-08-29

## 2024-08-29 ENCOUNTER — HOSPITAL ENCOUNTER (OUTPATIENT)
Dept: GASTROENTEROLOGY | Facility: HOSPITAL | Age: 79
Setting detail: OUTPATIENT SURGERY
End: 2024-08-29
Attending: INTERNAL MEDICINE
Payer: COMMERCIAL

## 2024-08-29 ENCOUNTER — ANESTHESIA EVENT (OUTPATIENT)
Dept: GASTROENTEROLOGY | Facility: HOSPITAL | Age: 79
End: 2024-08-29

## 2024-08-29 VITALS
HEART RATE: 70 BPM | SYSTOLIC BLOOD PRESSURE: 120 MMHG | TEMPERATURE: 97.6 F | BODY MASS INDEX: 29.13 KG/M2 | HEIGHT: 64 IN | WEIGHT: 170.64 LBS | DIASTOLIC BLOOD PRESSURE: 66 MMHG | RESPIRATION RATE: 21 BRPM | OXYGEN SATURATION: 95 %

## 2024-08-29 DIAGNOSIS — Z86.010 HISTORY OF COLON POLYPS: ICD-10-CM

## 2024-08-29 DIAGNOSIS — R19.8 ALTERNATING CONSTIPATION AND DIARRHEA: ICD-10-CM

## 2024-08-29 LAB — GLUCOSE SERPL-MCNC: 193 MG/DL (ref 65–140)

## 2024-08-29 PROCEDURE — 82948 REAGENT STRIP/BLOOD GLUCOSE: CPT

## 2024-08-29 PROCEDURE — 88305 TISSUE EXAM BY PATHOLOGIST: CPT | Performed by: PATHOLOGY

## 2024-08-29 PROCEDURE — 45380 COLONOSCOPY AND BIOPSY: CPT | Performed by: INTERNAL MEDICINE

## 2024-08-29 RX ORDER — LIDOCAINE HYDROCHLORIDE 20 MG/ML
INJECTION, SOLUTION EPIDURAL; INFILTRATION; INTRACAUDAL; PERINEURAL AS NEEDED
Status: DISCONTINUED | OUTPATIENT
Start: 2024-08-29 | End: 2024-08-29

## 2024-08-29 RX ORDER — PROPOFOL 10 MG/ML
INJECTION, EMULSION INTRAVENOUS AS NEEDED
Status: DISCONTINUED | OUTPATIENT
Start: 2024-08-29 | End: 2024-08-29

## 2024-08-29 RX ADMIN — PROPOFOL 30 MG: 10 INJECTION, EMULSION INTRAVENOUS at 10:43

## 2024-08-29 RX ADMIN — PROPOFOL 30 MG: 10 INJECTION, EMULSION INTRAVENOUS at 10:37

## 2024-08-29 RX ADMIN — PROPOFOL 80 MG: 10 INJECTION, EMULSION INTRAVENOUS at 10:31

## 2024-08-29 RX ADMIN — PROPOFOL 30 MG: 10 INJECTION, EMULSION INTRAVENOUS at 10:40

## 2024-08-29 RX ADMIN — PROPOFOL 30 MG: 10 INJECTION, EMULSION INTRAVENOUS at 10:34

## 2024-08-29 RX ADMIN — LIDOCAINE HYDROCHLORIDE 50 MG: 20 INJECTION, SOLUTION EPIDURAL; INFILTRATION; INTRACAUDAL; PERINEURAL at 10:31

## 2024-08-29 NOTE — ANESTHESIA POSTPROCEDURE EVALUATION
Post-Op Assessment Note    CV Status:  Stable  Pain Score: 0    Pain management: adequate       Mental Status:  Alert and awake   Hydration Status:  Euvolemic   PONV Controlled:  Controlled   Airway Patency:  Patent     Post Op Vitals Reviewed: Yes    No anethesia notable event occurred.    Staff: CRNA             BP      Temp      Pulse     Resp      SpO2

## 2024-08-29 NOTE — ANESTHESIA PREPROCEDURE EVALUATION
Procedure:  COLONOSCOPY    Relevant Problems   CARDIO   (+) Hyperlipidemia   (+) Hypertension      ENDO   (+) Hypothyroidism      /RENAL   (+) CKD (chronic kidney disease) stage 3, GFR 30-59 ml/min ([facility])      NEURO/PSYCH   (+) Reactive depression   (+) Spinal arachnoid cyst      Endocrine   (+) Diabetes (HCC)      Obstetrics/Gynecology   (+) Cystocele with uterine prolapse      Blood   (+) Macrocytosis without anemia      Orthopedic/Musculoskeletal   (+) Lumbar radiculopathy      Other   (+) Herpes simplex infection        Physical Exam    Airway    Mallampati score: II  TM Distance: >3 FB  Neck ROM: full     Dental       Cardiovascular  Cardiovascular exam normal    Pulmonary  Pulmonary exam normal     Other Findings  post-pubertal.      Anesthesia Plan  ASA Score- 3     Anesthesia Type- IV sedation with anesthesia with ASA Monitors.         Additional Monitors:     Airway Plan:            Plan Factors-Exercise tolerance (METS): >4 METS.    Chart reviewed. EKG reviewed. Imaging results reviewed. Existing labs reviewed. Patient summary reviewed.    Patient is not a current smoker.              Induction- intravenous.    Postoperative Plan-         Informed Consent- Anesthetic plan and risks discussed with patient.  I personally reviewed this patient with the CRNA. Discussed and agreed on the Anesthesia Plan with the CRNA..

## 2024-09-05 ENCOUNTER — OFFICE VISIT (OUTPATIENT)
Age: 79
End: 2024-09-05
Payer: COMMERCIAL

## 2024-09-05 ENCOUNTER — APPOINTMENT (OUTPATIENT)
Dept: LAB | Facility: HOSPITAL | Age: 79
End: 2024-09-05
Payer: COMMERCIAL

## 2024-09-05 VITALS
BODY MASS INDEX: 29.88 KG/M2 | RESPIRATION RATE: 18 BRPM | HEIGHT: 64 IN | SYSTOLIC BLOOD PRESSURE: 128 MMHG | WEIGHT: 175 LBS | DIASTOLIC BLOOD PRESSURE: 64 MMHG | HEART RATE: 92 BPM | OXYGEN SATURATION: 98 %

## 2024-09-05 DIAGNOSIS — B35.3 TINEA PEDIS OF RIGHT FOOT: ICD-10-CM

## 2024-09-05 DIAGNOSIS — E78.2 MIXED HYPERLIPIDEMIA: Primary | ICD-10-CM

## 2024-09-05 DIAGNOSIS — B00.9 HERPES SIMPLEX INFECTION: ICD-10-CM

## 2024-09-05 DIAGNOSIS — E11.9 TYPE 2 DIABETES MELLITUS WITHOUT COMPLICATION, WITHOUT LONG-TERM CURRENT USE OF INSULIN (HCC): ICD-10-CM

## 2024-09-05 DIAGNOSIS — I10 PRIMARY HYPERTENSION: ICD-10-CM

## 2024-09-05 DIAGNOSIS — E03.9 ACQUIRED HYPOTHYROIDISM: ICD-10-CM

## 2024-09-05 DIAGNOSIS — F32.9 REACTIVE DEPRESSION: ICD-10-CM

## 2024-09-05 LAB
ANION GAP SERPL CALCULATED.3IONS-SCNC: 6 MMOL/L (ref 4–13)
BUN SERPL-MCNC: 20 MG/DL (ref 5–25)
CALCIUM SERPL-MCNC: 9.8 MG/DL (ref 8.4–10.2)
CHLORIDE SERPL-SCNC: 105 MMOL/L (ref 96–108)
CO2 SERPL-SCNC: 28 MMOL/L (ref 21–32)
CREAT SERPL-MCNC: 0.97 MG/DL (ref 0.6–1.3)
GFR SERPL CREATININE-BSD FRML MDRD: 55 ML/MIN/1.73SQ M
GLUCOSE P FAST SERPL-MCNC: 166 MG/DL (ref 65–99)
POTASSIUM SERPL-SCNC: 4.6 MMOL/L (ref 3.5–5.3)
SODIUM SERPL-SCNC: 139 MMOL/L (ref 135–147)

## 2024-09-05 PROCEDURE — 1160F RVW MEDS BY RX/DR IN RCRD: CPT | Performed by: INTERNAL MEDICINE

## 2024-09-05 PROCEDURE — 3725F SCREEN DEPRESSION PERFORMED: CPT | Performed by: INTERNAL MEDICINE

## 2024-09-05 PROCEDURE — G2211 COMPLEX E/M VISIT ADD ON: HCPCS | Performed by: INTERNAL MEDICINE

## 2024-09-05 PROCEDURE — 1159F MED LIST DOCD IN RCRD: CPT | Performed by: INTERNAL MEDICINE

## 2024-09-05 PROCEDURE — 99214 OFFICE O/P EST MOD 30 MIN: CPT | Performed by: INTERNAL MEDICINE

## 2024-09-05 RX ORDER — VALACYCLOVIR HYDROCHLORIDE 1 G/1
2000 TABLET, FILM COATED ORAL 2 TIMES DAILY
Qty: 4 TABLET | Refills: 0 | Status: SHIPPED | OUTPATIENT
Start: 2024-09-05 | End: 2024-09-06

## 2024-09-05 RX ORDER — ECONAZOLE NITRATE 10 MG/G
CREAM TOPICAL DAILY
Qty: 30 G | Refills: 0 | Status: SHIPPED | OUTPATIENT
Start: 2024-09-05

## 2024-09-05 RX ORDER — ESCITALOPRAM OXALATE 10 MG/1
15 TABLET ORAL DAILY
Qty: 45 TABLET | Refills: 7 | Status: SHIPPED | OUTPATIENT
Start: 2024-09-05

## 2024-09-05 NOTE — PROGRESS NOTES
Assessment/Plan:    Diagnoses and all orders for this visit:    Mixed hyperlipidemia  -     Lipid Panel with Direct LDL reflex; Future    Herpes simplex infection  -     valACYclovir (VALTREX) 1,000 mg tablet; Take 2 tablets (2,000 mg total) by mouth 2 (two) times a day for 2 doses    Type 2 diabetes mellitus without complication, without long-term current use of insulin (HCC)  -     Hemoglobin A1C; Future    Acquired hypothyroidism  -     TSH, 3rd generation with Free T4 reflex; Future    Primary hypertension  -     Comprehensive metabolic panel; Future  -     CBC and differential; Future    Reactive depression  -     escitalopram (LEXAPRO) 10 mg tablet; Take 1.5 tablets (15 mg total) by mouth daily    Tinea pedis of right foot  -     econazole nitrate 1 % cream; Apply topically daily              Patient Instructions   Depression - Will increase Lexapro to 15 mg daily.     Diabetes - Continue Jardiance and metformin. Recommend continued lifestyle modifications.    Hypothyroidism - Taking levothyroxine daily as prescribed with an extra dose on Sundays. Will repeat testing in -5 months.    HSV flare - Take Valtrex 2g twice daily for 2 doses.    Will follow-up in 5 weeks given increased Lexapro dosing.    Routine follow-up in 4 months with labs prior.  Patient Education     Type 2 diabetes   The Basics   Written by the doctors and editors at [facility], [place]   What is type 2 diabetes? -- This is a disorder that disrupts the way the body uses sugar. It is sometimes called type 2 diabetes mellitus.  All of the cells in the body need sugar to work normally. Sugar gets into the cells with the help of a hormone called insulin. Insulin is made by the pancreas, an organ in the belly. If there is not enough insulin, or if cells in the body don't respond normally to insulin, sugar builds up in the blood. That is what happens to people with diabetes.  There are 2 different types of diabetes:   In type 1 diabetes, the pancreas makes  "little or no insulin.   In type 2 diabetes, the pancreas still makes some insulin, but the cells in the body stop responding normally. Eventually, the pancreas cannot make enough insulin to keep up.  Having excess body weight or obesity increases a person's risk of developing type 2 diabetes. But people without excess body weight can get diabetes, too.  What are the symptoms of type 2 diabetes? -- Type 2 diabetes usually causes no symptoms. When symptoms do happen, they include:   Needing to urinate often   Intense thirst   Blurry vision  Can diabetes lead to other health problems? -- Yes. Type 2 diabetes might not make you feel sick. But if it is not managed, it can lead to serious problems over time, such as:   Heart attacks   Strokes   Kidney disease   Vision problems (or even blindness)   Pain or loss of feeling in the hands and feet   Needing to have fingers, toes, or other body parts removed (amputated)  How do I know if I have type 2 diabetes? -- Your doctor or nurse can do a blood test. There are 2 tests that can be used for this. Both involve measuring the amount of sugar in your blood, called your \"blood sugar\" or \"blood glucose\":   One of the tests measures your blood sugar at the time the blood sample is taken. This test is done in the morning. You can't eat or drink anything except water for at least 8 hours before the test.   The other test shows what your average blood sugar has been for the past 2 to 3 months. This blood test is called \"hemoglobin A1C\" or just \"A1C.\" It can be checked at any time of the day, even if you have recently eaten.  How is type 2 diabetes treated? -- The goals of treatment are to manage your blood sugar and lower the risk of future problems that can happen in people with diabetes.  Treatment might include:   Lifestyle changes - This is an important part of managing diabetes. It includes eating healthy foods and getting plenty of physical activity.   Medicines - There are a " "few medicines that help lower blood sugar. Some people need to take pills that help the body make more insulin or that help insulin do its job. Others need insulin shots.  Depending on what medicines you take, you might need to check your blood sugar regularly at home. But not everyone with type 2 diabetes needs to do this. Your doctor or nurse will tell you if you should be checking your blood sugar, and when and how to do this.  Sometimes, people with type 2 diabetes also need medicines to help prevent problems caused by the disease. For instance, medicines used to lower blood pressure can reduce the chances of a heart attack or stroke.   General medical care - It's also important to take care of other areas of your health. This includes watching your blood pressure and cholesterol levels. You should also get certain vaccines, such as vaccines to protect against the flu and coronavirus disease 2019 (\"COVID-19\"). Some people also need a vaccine to prevent pneumonia.  Can type 2 diabetes be prevented? -- Yes. To lower your chances of getting type 2 diabetes, the most important thing you can do is eat a healthy diet and get plenty of physical activity. This can help you lose weight if you are overweight. But eating well and being active are also good for your overall health. Even gentle activity, like walking, has benefits.  If you smoke, quitting can also lower your risk of type 2 diabetes. Quitting smoking can be difficult, but your doctor or nurse can help.  All topics are updated as new evidence becomes available and our peer review process is complete.  This topic retrieved from [employer] on: Apr 24, 2024.  Topic 54050 Version 23.0  Release: 32.3.2 - C32.113  © 2024 UpToDate, Inc. and/or its affiliates. All rights reserved.  Consumer Information Use and Disclaimer   Disclaimer: This generalized information is a limited summary of diagnosis, treatment, and/or medication information. It is not meant to be " comprehensive and should be used as a tool to help the user understand and/or assess potential diagnostic and treatment options. It does NOT include all information about conditions, treatments, medications, side effects, or risks that may apply to a specific patient. It is not intended to be medical advice or a substitute for the medical advice, diagnosis, or treatment of a health care provider based on the health care provider's examination and assessment of a patient's specific and unique circumstances. Patients must speak with a health care provider for complete information about their health, medical questions, and treatment options, including any risks or benefits regarding use of medications. This information does not endorse any treatments or medications as safe, effective, or approved for treating a specific patient. UpToDate, Inc. and its affiliates disclaim any warranty or liability relating to this information or the use thereof.The use of this information is governed by the Terms of Use, available at https://www.TextualAds.Neuralieve/en/know/clinical-effectiveness-terms. 2024© UpToDate, Inc. and its affiliates and/or licensors. All rights reserved.  Copyright   © 2024 UpToDate, Inc. and/or its affiliates. All rights reserved.       Subjective:      Patient ID: Delmi Epps is a 79 y.o. female    Patient presents for routine follow-up regarding her depression and diabetes.     She states that she is doing much better than she was at her last visit. She is taking Lexapro daily as prescribed. Her sleep, lethargy, and malaise are significantly improved. Denies suicidal ideation. A recent trip to [place] with family has put her in better spirits. She has started cooking again. She also takes care of feral/community cats.     She has also been taking Jardiance as prescribed. Her fingerstick blood sugar this morning was 155, which was the lowest it's been. Reports some increased urinary frequency, but denies  dysuria.    She underwent colonoscopy in the interim, which was unremarkable. Random biopsy results pending.     She also had a flare-up of HSV recently, likely stress-related.        Current Outpatient Medications:     acetaminophen (TYLENOL) 325 mg tablet, Take 2 tablets (650 mg total) by mouth every 6 (six) hours as needed for mild pain, Disp: 30 tablet, Rfl: 0    amLODIPine-benazepril (LOTREL 5-10) 5-10 MG per capsule, TAKE 1 CAPSULE BY MOUTH EVERY DAY, Disp: 90 capsule, Rfl: 3    atorvastatin (LIPITOR) 10 mg tablet, TAKE 1 TABLET BY MOUTH EVERY DAY, Disp: 90 tablet, Rfl: 3    Blood Glucose Monitoring Suppl (OneTouch Verio Reflect) w/Device KIT, Check blood sugars twice daily. Please substitute with appropriate alternative as covered by patient's insurance. Dx: E11.65, Disp: 1 kit, Rfl: 0    econazole nitrate 1 % cream, Apply topically daily, Disp: 30 g, Rfl: 0    Empagliflozin (Jardiance) 10 MG TABS tablet, Take 1 tablet (10 mg total) by mouth in the morning, Disp: 90 tablet, Rfl: 1    escitalopram (LEXAPRO) 10 mg tablet, Take 1.5 tablets (15 mg total) by mouth daily, Disp: 45 tablet, Rfl: 7    glucose blood (OneTouch Verio) test strip, Check blood sugars twice daily. Please substitute with appropriate alternative as covered by patient's insurance. Dx: E11.65, Disp: 200 each, Rfl: 3    Lancets (ONETOUCH DELICA PLUS [id number]) MISC, TEST DAILY, Disp: 100 each, Rfl: 2    latanoprost (XALATAN) 0.005 % ophthalmic solution, INSTILL 1 DROP INTO BOTH EYES AT BEDTIME, Disp: , Rfl:     levothyroxine 100 mcg tablet, TAKE 1 TABLET BY MOUTH EVERY DAY AND TWO TABLETS ON ONE DAY OF THE WEEK., Disp: 90 tablet, Rfl: 3    metFORMIN (GLUCOPHAGE-XR) 500 mg 24 hr tablet, Take 1 tablet (500 mg total) by mouth 2 (two) times a day with meals She is taking one tablet twice daily., Disp: 360 tablet, Rfl: 1    Multiple Vitamins-Minerals (CENTRUM SILVER) CHEW, Chew 1 tablet daily, Disp: , Rfl:      neomycin-colistin-hydrocortisone-thonzonium (CORTISPORIN TC) 0.33-0.3-1-0.05 % otic suspension, Administer 3 drops into both ears 4 (four) times a day, Disp: 10 mL, Rfl: 0    neomycin-polymyxin-hydrocortisone (CORTISPORIN) 0.35%-10,000 units/mL-1% otic suspension, Administer 4 drops to the right ear 4 (four) times a day, Disp: 10 mL, Rfl: 0    ONE TOUCH ULTRA TEST test strip, 1 EACH BY OTHER ROUTE DAILY USE AS INSTRUCTED, Disp: 100 each, Rfl: 2    OneTouch Delica Lancets 30G MISC, TEST 1 DAILY AS INSTRUCTED, Disp: 100 each, Rfl: 2    OneTouch Delica Lancets 33G MISC, Check blood sugars twice daily. Please substitute with appropriate alternative as covered by patient's insurance. Dx: E11.65, Disp: 200 each, Rfl: 3    spironolactone (ALDACTONE) 25 mg tablet, TAKE 1 TABLET BY MOUTH EVERY DAY, Disp: 90 tablet, Rfl: 3    valACYclovir (VALTREX) 1,000 mg tablet, Take 2 tablets (2,000 mg total) by mouth 2 (two) times a day for 2 doses, Disp: 4 tablet, Rfl: 0    Recent Results (from the past 1008 hour(s))   CBC and differential    Collection Time: 07/26/24 11:58 AM   Result Value Ref Range    WBC 9.31 4.31 - 10.16 Thousand/uL    RBC 3.98 3.81 - 5.12 Million/uL    Hemoglobin 13.2 11.5 - 15.4 g/dL    Hematocrit 39.0 34.8 - 46.1 %    MCV 98 82 - 98 fL    MCH 33.2 26.8 - 34.3 pg    MCHC 33.8 31.4 - 37.4 g/dL    RDW 12.3 11.6 - 15.1 %    MPV 9.4 8.9 - 12.7 fL    Platelets 229 149 - 390 Thousands/uL    nRBC 0 /100 WBCs    Segmented % 68 43 - 75 %    Immature Grans % 1 0 - 2 %    Lymphocytes % 22 14 - 44 %    Monocytes % 6 4 - 12 %    Eosinophils Relative 2 0 - 6 %    Basophils Relative 1 0 - 1 %    Absolute Neutrophils 6.32 1.85 - 7.62 Thousands/µL    Absolute Immature Grans 0.05 0.00 - 0.20 Thousand/uL    Absolute Lymphocytes 2.08 0.60 - 4.47 Thousands/µL    Absolute Monocytes 0.59 0.17 - 1.22 Thousand/µL    Eosinophils Absolute 0.20 0.00 - 0.61 Thousand/µL    Basophils Absolute 0.07 0.00 - 0.10 Thousands/µL   Comprehensive  metabolic panel    Collection Time: 07/26/24 11:58 AM   Result Value Ref Range    Sodium 139 135 - 147 mmol/L    Potassium 4.5 3.5 - 5.3 mmol/L    Chloride 105 96 - 108 mmol/L    CO2 27 21 - 32 mmol/L    ANION GAP 7 4 - 13 mmol/L    BUN 19 5 - 25 mg/dL    Creatinine 0.96 0.60 - 1.30 mg/dL    Glucose, Fasting 199 (H) 65 - 99 mg/dL    Calcium 9.5 8.4 - 10.2 mg/dL    AST 22 13 - 39 U/L    ALT 25 7 - 52 U/L    Alkaline Phosphatase 43 34 - 104 U/L    Total Protein 7.4 6.4 - 8.4 g/dL    Albumin 4.3 3.5 - 5.0 g/dL    Total Bilirubin 0.42 0.20 - 1.00 mg/dL    eGFR 56 ml/min/1.73sq m   Lipid panel    Collection Time: 07/26/24 11:58 AM   Result Value Ref Range    Cholesterol 127 See Comment mg/dL    Triglycerides 236 (H) See Comment mg/dL    HDL, Direct 43 (L) >=50 mg/dL    LDL Calculated 37 0 - 100 mg/dL    Non-HDL-Chol (CHOL-HDL) 84 mg/dl   Hemoglobin A1C    Collection Time: 07/26/24 11:58 AM   Result Value Ref Range    Hemoglobin A1C 8.7 (H) Normal 4.0-5.6%; PreDiabetic 5.7-6.4%; Diabetic >=6.5%; Glycemic control for adults with diabetes <7.0% %     mg/dl   TSH, 3rd generation with Free T4 reflex    Collection Time: 07/26/24 11:58 AM   Result Value Ref Range    TSH 3RD GENERATON 7.573 (H) 0.450 - 4.500 uIU/mL   Uric acid    Collection Time: 07/26/24 11:58 AM   Result Value Ref Range    Uric Acid 4.5 2.0 - 7.5 mg/dL   Albumin / creatinine urine ratio    Collection Time: 07/26/24 11:58 AM   Result Value Ref Range    Creatinine, Ur 146.6 Reference range not established. mg/dL    Albumin,U,Random 28.7 (H) <20.0 mg/L    Albumin Creat Ratio 20 0 - 30 mg/g creatinine   Phosphorus    Collection Time: 07/26/24 11:58 AM   Result Value Ref Range    Phosphorus 3.2 2.3 - 4.1 mg/dL   Magnesium    Collection Time: 07/26/24 11:58 AM   Result Value Ref Range    Magnesium 1.3 (L) 1.9 - 2.7 mg/dL   T4, free    Collection Time: 07/26/24 11:58 AM   Result Value Ref Range    Free T4 0.57 (L) 0.61 - 1.12 ng/dL   Fingerstick Glucose (POCT)     Collection Time: 08/29/24 10:02 AM   Result Value Ref Range    POC Glucose 193 (H) 65 - 140 mg/dl   Basic metabolic panel    Collection Time: 09/05/24 10:19 AM   Result Value Ref Range    Sodium 139 135 - 147 mmol/L    Potassium 4.6 3.5 - 5.3 mmol/L    Chloride 105 96 - 108 mmol/L    CO2 28 21 - 32 mmol/L    ANION GAP 6 4 - 13 mmol/L    BUN 20 5 - 25 mg/dL    Creatinine 0.97 0.60 - 1.30 mg/dL    Glucose, Fasting 166 (H) 65 - 99 mg/dL    Calcium 9.8 8.4 - 10.2 mg/dL    eGFR 55 ml/min/1.73sq m       The following portions of the patient's history were reviewed and updated as appropriate: allergies, current medications, past family history, past medical history, past social history, past surgical history and problem list.     Review of Systems   Constitutional:  Negative for chills and fever.   HENT:  Negative for ear pain and sore throat.    Eyes:  Negative for pain and visual disturbance.   Respiratory:  Negative for cough and shortness of breath.    Cardiovascular:  Negative for chest pain and palpitations.   Gastrointestinal:  Negative for abdominal pain and vomiting.   Genitourinary:  Negative for dysuria and hematuria.   Musculoskeletal:  Negative for arthralgias and back pain.   Skin:  Negative for color change and rash.   Neurological:  Negative for seizures and syncope.   All other systems reviewed and are negative.        Objective:      Vitals:    09/05/24 1523   BP: 128/64   Pulse: 92   Resp: 18   SpO2: 98%          Physical Exam  Constitutional:       General: She is not in acute distress.     Appearance: Normal appearance. She is obese. She is not ill-appearing.   HENT:      Head: Normocephalic and atraumatic.      Right Ear: External ear normal.      Left Ear: External ear normal.      Nose: Nose normal. No congestion or rhinorrhea.      Mouth/Throat:      Mouth: Mucous membranes are moist.   Eyes:      Extraocular Movements: Extraocular movements intact.      Conjunctiva/sclera: Conjunctivae normal.       Pupils: Pupils are equal, round, and reactive to light.   Cardiovascular:      Rate and Rhythm: Normal rate and regular rhythm.      Pulses: Normal pulses.      Heart sounds: Normal heart sounds. No murmur heard.     No friction rub. No gallop.   Pulmonary:      Effort: Pulmonary effort is normal. No respiratory distress.      Breath sounds: Normal breath sounds. No wheezing, rhonchi or rales.   Abdominal:      General: There is no distension.      Palpations: Abdomen is soft. There is no mass.      Tenderness: There is no abdominal tenderness.   Musculoskeletal:         General: Normal range of motion.      Cervical back: Normal range of motion and neck supple.      Right lower leg: No edema.      Left lower leg: No edema.   Skin:     General: Skin is warm and dry.   Neurological:      Mental Status: She is alert and oriented to person, place, and time.   Psychiatric:         Mood and Affect: Mood normal.         Behavior: Behavior normal.

## 2024-09-05 NOTE — PATIENT INSTRUCTIONS
Depression - Will increase Lexapro to 15 mg daily.     Diabetes - Continue Jardiance and metformin. Recommend continued lifestyle modifications.    Hypothyroidism - Taking levothyroxine daily as prescribed with an extra dose on Sundays. Will repeat testing in -5 months.    HSV flare - Take Valtrex 2g twice daily for 2 doses.    Will follow-up in 5 weeks given increased Lexapro dosing.    Routine follow-up in 4 months with labs prior.  Patient Education     Type 2 diabetes   The Basics   Written by the doctors and editors at [facility]   What is type 2 diabetes? -- This is a disorder that disrupts the way the body uses sugar. It is sometimes called type 2 diabetes mellitus.  All of the cells in the body need sugar to work normally. Sugar gets into the cells with the help of a hormone called insulin. Insulin is made by the pancreas, an organ in the belly. If there is not enough insulin, or if cells in the body don't respond normally to insulin, sugar builds up in the blood. That is what happens to people with diabetes.  There are 2 different types of diabetes:   In type 1 diabetes, the pancreas makes little or no insulin.   In type 2 diabetes, the pancreas still makes some insulin, but the cells in the body stop responding normally. Eventually, the pancreas cannot make enough insulin to keep up.  Having excess body weight or obesity increases a person's risk of developing type 2 diabetes. But people without excess body weight can get diabetes, too.  What are the symptoms of type 2 diabetes? -- Type 2 diabetes usually causes no symptoms. When symptoms do happen, they include:   Needing to urinate often   Intense thirst   Blurry vision  Can diabetes lead to other health problems? -- Yes. Type 2 diabetes might not make you feel sick. But if it is not managed, it can lead to serious problems over time, such as:   Heart attacks   Strokes   Kidney disease   Vision problems (or even blindness)   Pain or loss of feeling in the  "hands and feet   Needing to have fingers, toes, or other body parts removed (amputated)  How do I know if I have type 2 diabetes? -- Your doctor or nurse can do a blood test. There are 2 tests that can be used for this. Both involve measuring the amount of sugar in your blood, called your \"blood sugar\" or \"blood glucose\":   One of the tests measures your blood sugar at the time the blood sample is taken. This test is done in the morning. You can't eat or drink anything except water for at least 8 hours before the test.   The other test shows what your average blood sugar has been for the past 2 to 3 months. This blood test is called \"hemoglobin A1C\" or just \"A1C.\" It can be checked at any time of the day, even if you have recently eaten.  How is type 2 diabetes treated? -- The goals of treatment are to manage your blood sugar and lower the risk of future problems that can happen in people with diabetes.  Treatment might include:   Lifestyle changes - This is an important part of managing diabetes. It includes eating healthy foods and getting plenty of physical activity.   Medicines - There are a few medicines that help lower blood sugar. Some people need to take pills that help the body make more insulin or that help insulin do its job. Others need insulin shots.  Depending on what medicines you take, you might need to check your blood sugar regularly at home. But not everyone with type 2 diabetes needs to do this. Your doctor or nurse will tell you if you should be checking your blood sugar, and when and how to do this.  Sometimes, people with type 2 diabetes also need medicines to help prevent problems caused by the disease. For instance, medicines used to lower blood pressure can reduce the chances of a heart attack or stroke.   General medical care - It's also important to take care of other areas of your health. This includes watching your blood pressure and cholesterol levels. You should also get certain " "vaccines, such as vaccines to protect against the flu and coronavirus disease 2019 (\"COVID-19\"). Some people also need a vaccine to prevent pneumonia.  Can type 2 diabetes be prevented? -- Yes. To lower your chances of getting type 2 diabetes, the most important thing you can do is eat a healthy diet and get plenty of physical activity. This can help you lose weight if you are overweight. But eating well and being active are also good for your overall health. Even gentle activity, like walking, has benefits.  If you smoke, quitting can also lower your risk of type 2 diabetes. Quitting smoking can be difficult, but your doctor or nurse can help.  All topics are updated as new evidence becomes available and our peer review process is complete.  This topic retrieved from [employer] on: Apr 24, 2024.  Topic 66067 Version 23.0  Release: 32.3.2 - C32.113  © 2024 UpToDate, Inc. and/or its affiliates. All rights reserved.  Consumer Information Use and Disclaimer   Disclaimer: This generalized information is a limited summary of diagnosis, treatment, and/or medication information. It is not meant to be comprehensive and should be used as a tool to help the user understand and/or assess potential diagnostic and treatment options. It does NOT include all information about conditions, treatments, medications, side effects, or risks that may apply to a specific patient. It is not intended to be medical advice or a substitute for the medical advice, diagnosis, or treatment of a health care provider based on the health care provider's examination and assessment of a patient's specific and unique circumstances. Patients must speak with a health care provider for complete information about their health, medical questions, and treatment options, including any risks or benefits regarding use of medications. This information does not endorse any treatments or medications as safe, effective, or approved for treating a specific patient. " UpToDate, Inc. and its affiliates disclaim any warranty or liability relating to this information or the use thereof.The use of this information is governed by the Terms of Use, available at https://www.wolterskluwer.com/en/know/clinical-effectiveness-terms. 2024© UpToDate, Inc. and its affiliates and/or licensors. All rights reserved.  Copyright   © 2024 UpToDate, Inc. and/or its affiliates. All rights reserved.

## 2024-09-09 PROCEDURE — 88305 TISSUE EXAM BY PATHOLOGIST: CPT | Performed by: PATHOLOGY

## 2024-10-10 ENCOUNTER — OFFICE VISIT (OUTPATIENT)
Age: 79
End: 2024-10-10
Payer: COMMERCIAL

## 2024-10-10 ENCOUNTER — APPOINTMENT (OUTPATIENT)
Dept: LAB | Facility: HOSPITAL | Age: 79
End: 2024-10-10
Payer: COMMERCIAL

## 2024-10-10 VITALS
BODY MASS INDEX: 30.04 KG/M2 | DIASTOLIC BLOOD PRESSURE: 52 MMHG | OXYGEN SATURATION: 96 % | HEART RATE: 86 BPM | HEIGHT: 64 IN | RESPIRATION RATE: 16 BRPM | SYSTOLIC BLOOD PRESSURE: 116 MMHG

## 2024-10-10 DIAGNOSIS — R35.0 URINARY FREQUENCY: ICD-10-CM

## 2024-10-10 DIAGNOSIS — B00.9 HERPES SIMPLEX INFECTION: ICD-10-CM

## 2024-10-10 DIAGNOSIS — N30.00 ACUTE CYSTITIS WITHOUT HEMATURIA: Primary | ICD-10-CM

## 2024-10-10 DIAGNOSIS — F32.9 REACTIVE DEPRESSION: Primary | ICD-10-CM

## 2024-10-10 DIAGNOSIS — E11.9 TYPE 2 DIABETES MELLITUS WITHOUT COMPLICATION, WITHOUT LONG-TERM CURRENT USE OF INSULIN (HCC): ICD-10-CM

## 2024-10-10 LAB
BACTERIA UR QL AUTO: ABNORMAL /HPF
BILIRUB UR QL STRIP: NEGATIVE
CLARITY UR: ABNORMAL
COLOR UR: ABNORMAL
GLUCOSE UR STRIP-MCNC: ABNORMAL MG/DL
HGB UR QL STRIP.AUTO: NEGATIVE
KETONES UR STRIP-MCNC: NEGATIVE MG/DL
LEUKOCYTE ESTERASE UR QL STRIP: ABNORMAL
NITRITE UR QL STRIP: NEGATIVE
NON-SQ EPI CELLS URNS QL MICRO: ABNORMAL /HPF
PH UR STRIP.AUTO: 5.5 [PH]
PROT UR STRIP-MCNC: NEGATIVE MG/DL
RBC #/AREA URNS AUTO: ABNORMAL /HPF
SP GR UR STRIP.AUTO: 1.03 (ref 1–1.03)
UROBILINOGEN UR STRIP-ACNC: <2 MG/DL
WBC #/AREA URNS AUTO: ABNORMAL /HPF
WBC CLUMPS # UR AUTO: PRESENT /UL

## 2024-10-10 PROCEDURE — 87186 SC STD MICRODIL/AGAR DIL: CPT

## 2024-10-10 PROCEDURE — 99214 OFFICE O/P EST MOD 30 MIN: CPT | Performed by: INTERNAL MEDICINE

## 2024-10-10 PROCEDURE — G2211 COMPLEX E/M VISIT ADD ON: HCPCS | Performed by: INTERNAL MEDICINE

## 2024-10-10 PROCEDURE — 81001 URINALYSIS AUTO W/SCOPE: CPT

## 2024-10-10 PROCEDURE — 87077 CULTURE AEROBIC IDENTIFY: CPT

## 2024-10-10 PROCEDURE — 87086 URINE CULTURE/COLONY COUNT: CPT

## 2024-10-10 RX ORDER — LANCETS 30 GAUGE
1 EACH MISCELLANEOUS DAILY
Qty: 100 EACH | Refills: 2 | Status: SHIPPED | OUTPATIENT
Start: 2024-10-10

## 2024-10-10 RX ORDER — SULFAMETHOXAZOLE AND TRIMETHOPRIM 800; 160 MG/1; MG/1
1 TABLET ORAL 2 TIMES DAILY
Qty: 6 TABLET | Refills: 0 | Status: SHIPPED | OUTPATIENT
Start: 2024-10-10 | End: 2024-10-13

## 2024-10-10 RX ORDER — BLOOD SUGAR DIAGNOSTIC
STRIP MISCELLANEOUS
Qty: 200 EACH | Refills: 3 | Status: SHIPPED | OUTPATIENT
Start: 2024-10-10

## 2024-10-10 RX ORDER — VALACYCLOVIR HYDROCHLORIDE 1 G/1
2000 TABLET, FILM COATED ORAL 2 TIMES DAILY
Qty: 4 TABLET | Refills: 0 | Status: SHIPPED | OUTPATIENT
Start: 2024-10-10 | End: 2024-10-11

## 2024-10-10 RX ORDER — ESCITALOPRAM OXALATE 20 MG/1
20 TABLET ORAL DAILY
Qty: 90 TABLET | Refills: 3 | Status: SHIPPED | OUTPATIENT
Start: 2024-10-10

## 2024-10-10 NOTE — PROGRESS NOTES
"Assessment/Plan:    Diagnoses and all orders for this visit:    Reactive depression  -     escitalopram (LEXAPRO) 20 mg tablet; Take 1 tablet (20 mg total) by mouth daily    Type 2 diabetes mellitus without complication, without long-term current use of insulin ([facility])  -     glucose blood (OneTouch Verio) test strip; Check blood sugars twice daily. Please substitute with appropriate alternative as covered by patient's insurance. Dx: E11.65  -     OneTouch Delica Lancets 30G MISC; Inject 1 each under the skin in the morning  -     Empagliflozin (Jardiance) 25 MG TABS; Take 1 tablet (25 mg total) by mouth in the morning    Herpes simplex infection  -     valACYclovir (VALTREX) 1,000 mg tablet; Take 2 tablets (2,000 mg total) by mouth 2 (two) times a day for 2 doses    Urinary frequency  -     UA w Reflex to Microscopic w Reflex to Culture; Future              Patient Instructions   Urinary frequency with dysuria-will check urinalysis and culture and follow accordingly    Diabetes-improved control with addition of Jardiance, will increase dose to 25 as blood sugar still greater than 150.    Reactive depression markedly improved on Lexapro, continue with same    Subjective:      Patient ID: Delmi Epps is a 79 y.o. female    Patient presents for routine follow-up regarding her depression and diabetes.     She states that she is doing 80% better than she was at her last visit.  Now on 20 mg daily lexapro.   She still has \"melt downs\" on occasion.    More bothered by tremor lately.  Difficulty writing, but not eating.      She has also been taking Jardiance as prescribed. Her fingerstick blood sugars are still ~150. Reports some increased urinary frequency and now dysuria x several days.     She underwent colonoscopy in the interim, which was remarkable only for diverticulosis.     She also had a flare-up of HSV recently, likely stress-related.  Needs refills.           Current Outpatient Medications:     acetaminophen " (TYLENOL) 325 mg tablet, Take 2 tablets (650 mg total) by mouth every 6 (six) hours as needed for mild pain, Disp: 30 tablet, Rfl: 0    amLODIPine-benazepril (LOTREL 5-10) 5-10 MG per capsule, TAKE 1 CAPSULE BY MOUTH EVERY DAY, Disp: 90 capsule, Rfl: 3    atorvastatin (LIPITOR) 10 mg tablet, TAKE 1 TABLET BY MOUTH EVERY DAY, Disp: 90 tablet, Rfl: 3    Blood Glucose Monitoring Suppl (OneTouch Verio Reflect) w/Device KIT, Check blood sugars twice daily. Please substitute with appropriate alternative as covered by patient's insurance. Dx: E11.65, Disp: 1 kit, Rfl: 0    econazole nitrate 1 % cream, Apply topically daily, Disp: 30 g, Rfl: 0    Empagliflozin (Jardiance) 25 MG TABS, Take 1 tablet (25 mg total) by mouth in the morning, Disp: 90 tablet, Rfl: 3    escitalopram (LEXAPRO) 20 mg tablet, Take 1 tablet (20 mg total) by mouth daily, Disp: 90 tablet, Rfl: 3    glucose blood (OneTouch Verio) test strip, Check blood sugars twice daily. Please substitute with appropriate alternative as covered by patient's insurance. Dx: E11.65, Disp: 200 each, Rfl: 3    Lancets (ONETOUCH DELICA PLUS [id number]) MISC, TEST DAILY, Disp: 100 each, Rfl: 2    latanoprost (XALATAN) 0.005 % ophthalmic solution, INSTILL 1 DROP INTO BOTH EYES AT BEDTIME, Disp: , Rfl:     levothyroxine 100 mcg tablet, TAKE 1 TABLET BY MOUTH EVERY DAY AND TWO TABLETS ON ONE DAY OF THE WEEK., Disp: 90 tablet, Rfl: 3    metFORMIN (GLUCOPHAGE-XR) 500 mg 24 hr tablet, Take 1 tablet (500 mg total) by mouth 2 (two) times a day with meals She is taking one tablet twice daily., Disp: 360 tablet, Rfl: 1    Multiple Vitamins-Minerals (CENTRUM SILVER) CHEW, Chew 1 tablet daily, Disp: , Rfl:     neomycin-colistin-hydrocortisone-thonzonium (CORTISPORIN TC) 0.33-0.3-1-0.05 % otic suspension, Administer 3 drops into both ears 4 (four) times a day, Disp: 10 mL, Rfl: 0    ONE TOUCH ULTRA TEST test strip, 1 EACH BY OTHER ROUTE DAILY USE AS INSTRUCTED, Disp: 100 each, Rfl: 2     OneTouch Delica Lancets 30G MISC, Inject 1 each under the skin in the morning, Disp: 100 each, Rfl: 2    OneTouch Delica Lancets 33G MISC, Check blood sugars twice daily. Please substitute with appropriate alternative as covered by patient's insurance. Dx: E11.65, Disp: 200 each, Rfl: 3    spironolactone (ALDACTONE) 25 mg tablet, TAKE 1 TABLET BY MOUTH EVERY DAY, Disp: 90 tablet, Rfl: 3    valACYclovir (VALTREX) 1,000 mg tablet, Take 2 tablets (2,000 mg total) by mouth 2 (two) times a day for 2 doses, Disp: 4 tablet, Rfl: 0    neomycin-polymyxin-hydrocortisone (CORTISPORIN) 0.35%-10,000 units/mL-1% otic suspension, Administer 4 drops to the right ear 4 (four) times a day (Patient not taking: Reported on 10/10/2024), Disp: 10 mL, Rfl: 0    Recent Results (from the past 1008 hour(s))   Basic metabolic panel    Collection Time: 09/05/24 10:19 AM   Result Value Ref Range    Sodium 139 135 - 147 mmol/L    Potassium 4.6 3.5 - 5.3 mmol/L    Chloride 105 96 - 108 mmol/L    CO2 28 21 - 32 mmol/L    ANION GAP 6 4 - 13 mmol/L    BUN 20 5 - 25 mg/dL    Creatinine 0.97 0.60 - 1.30 mg/dL    Glucose, Fasting 166 (H) 65 - 99 mg/dL    Calcium 9.8 8.4 - 10.2 mg/dL    eGFR 55 ml/min/1.73sq m       The following portions of the patient's history were reviewed and updated as appropriate: allergies, current medications, past family history, past medical history, past social history, past surgical history and problem list.     Review of Systems   Constitutional:  Negative for appetite change, chills, diaphoresis, fatigue, fever and unexpected weight change.   HENT:  Negative for congestion, hearing loss and rhinorrhea.    Eyes:  Negative for visual disturbance.   Respiratory:  Negative for cough, chest tightness, shortness of breath and wheezing.    Cardiovascular:  Negative for chest pain, palpitations and leg swelling.   Gastrointestinal:  Negative for abdominal pain and blood in stool.   Endocrine: Negative for cold intolerance, heat  intolerance, polydipsia and polyuria.   Genitourinary:  Positive for frequency and urgency. Negative for difficulty urinating and dysuria.   Musculoskeletal:  Negative for arthralgias and myalgias.   Skin:  Negative for rash.   Neurological:  Negative for dizziness, weakness, light-headedness and headaches.   Hematological:  Does not bruise/bleed easily.   Psychiatric/Behavioral:  Negative for dysphoric mood and sleep disturbance.          Objective:      Vitals:    10/10/24 1427   BP: 116/52   Pulse: 86   Resp: 16   SpO2: 96%          Physical Exam  Constitutional:       Appearance: She is well-developed.   HENT:      Head: Normocephalic and atraumatic.   Pulmonary:      Effort: Pulmonary effort is normal.   Neurological:      Mental Status: She is alert and oriented to person, place, and time.   Psychiatric:         Behavior: Behavior normal. Behavior is cooperative.         Thought Content: Thought content normal.         Judgment: Judgment normal.

## 2024-10-10 NOTE — PATIENT INSTRUCTIONS
Urinary frequency with dysuria-will check urinalysis and culture and follow accordingly    Diabetes-improved control with addition of Jardiance, will increase dose to 25 as blood sugar still greater than 150.    Reactive depression markedly improved on Lexapro, continue with same

## 2024-10-12 LAB
BACTERIA UR CULT: ABNORMAL
BACTERIA UR CULT: ABNORMAL

## 2024-12-07 DIAGNOSIS — I10 ESSENTIAL HYPERTENSION: ICD-10-CM

## 2024-12-07 DIAGNOSIS — E78.2 MIXED HYPERLIPIDEMIA: ICD-10-CM

## 2024-12-08 RX ORDER — ATORVASTATIN CALCIUM 10 MG/1
10 TABLET, FILM COATED ORAL DAILY
Qty: 90 TABLET | Refills: 3 | Status: SHIPPED | OUTPATIENT
Start: 2024-12-08

## 2024-12-08 RX ORDER — AMLODIPINE AND BENAZEPRIL HYDROCHLORIDE 5; 10 MG/1; MG/1
1 CAPSULE ORAL DAILY
Qty: 90 CAPSULE | Refills: 3 | Status: SHIPPED | OUTPATIENT
Start: 2024-12-08

## 2025-01-10 ENCOUNTER — APPOINTMENT (OUTPATIENT)
Dept: LAB | Facility: HOSPITAL | Age: 80
End: 2025-01-10
Payer: COMMERCIAL

## 2025-01-10 ENCOUNTER — TELEPHONE (OUTPATIENT)
Age: 80
End: 2025-01-10

## 2025-01-10 DIAGNOSIS — I10 PRIMARY HYPERTENSION: ICD-10-CM

## 2025-01-10 DIAGNOSIS — E03.9 ACQUIRED HYPOTHYROIDISM: ICD-10-CM

## 2025-01-10 DIAGNOSIS — E78.2 MIXED HYPERLIPIDEMIA: ICD-10-CM

## 2025-01-10 DIAGNOSIS — E11.9 TYPE 2 DIABETES MELLITUS WITHOUT COMPLICATION, WITHOUT LONG-TERM CURRENT USE OF INSULIN (HCC): ICD-10-CM

## 2025-01-10 LAB
ALBUMIN SERPL BCG-MCNC: 4.7 G/DL (ref 3.5–5)
ALP SERPL-CCNC: 42 U/L (ref 34–104)
ALT SERPL W P-5'-P-CCNC: 15 U/L (ref 7–52)
ANION GAP SERPL CALCULATED.3IONS-SCNC: 7 MMOL/L (ref 4–13)
AST SERPL W P-5'-P-CCNC: 18 U/L (ref 13–39)
BASOPHILS # BLD AUTO: 0.06 THOUSANDS/ΜL (ref 0–0.1)
BASOPHILS NFR BLD AUTO: 1 % (ref 0–1)
BILIRUB SERPL-MCNC: 0.47 MG/DL (ref 0.2–1)
BUN SERPL-MCNC: 21 MG/DL (ref 5–25)
CALCIUM SERPL-MCNC: 10.4 MG/DL (ref 8.4–10.2)
CHLORIDE SERPL-SCNC: 102 MMOL/L (ref 96–108)
CHOLEST SERPL-MCNC: 110 MG/DL (ref ?–200)
CO2 SERPL-SCNC: 29 MMOL/L (ref 21–32)
CREAT SERPL-MCNC: 0.95 MG/DL (ref 0.6–1.3)
EOSINOPHIL # BLD AUTO: 0.24 THOUSAND/ΜL (ref 0–0.61)
EOSINOPHIL NFR BLD AUTO: 3 % (ref 0–6)
ERYTHROCYTE [DISTWIDTH] IN BLOOD BY AUTOMATED COUNT: 12.8 % (ref 11.6–15.1)
EST. AVERAGE GLUCOSE BLD GHB EST-MCNC: 186 MG/DL
GFR SERPL CREATININE-BSD FRML MDRD: 57 ML/MIN/1.73SQ M
GLUCOSE P FAST SERPL-MCNC: 114 MG/DL (ref 65–99)
HBA1C MFR BLD: 8.1 %
HCT VFR BLD AUTO: 46.2 % (ref 34.8–46.1)
HDLC SERPL-MCNC: 45 MG/DL
HGB BLD-MCNC: 14.8 G/DL (ref 11.5–15.4)
IMM GRANULOCYTES # BLD AUTO: 0.02 THOUSAND/UL (ref 0–0.2)
IMM GRANULOCYTES NFR BLD AUTO: 0 % (ref 0–2)
LDLC SERPL CALC-MCNC: 36 MG/DL (ref 0–100)
LYMPHOCYTES # BLD AUTO: 2.1 THOUSANDS/ΜL (ref 0.6–4.47)
LYMPHOCYTES NFR BLD AUTO: 23 % (ref 14–44)
MCH RBC QN AUTO: 32.2 PG (ref 26.8–34.3)
MCHC RBC AUTO-ENTMCNC: 32 G/DL (ref 31.4–37.4)
MCV RBC AUTO: 100 FL (ref 82–98)
MONOCYTES # BLD AUTO: 0.57 THOUSAND/ΜL (ref 0.17–1.22)
MONOCYTES NFR BLD AUTO: 6 % (ref 4–12)
NEUTROPHILS # BLD AUTO: 6.32 THOUSANDS/ΜL (ref 1.85–7.62)
NEUTS SEG NFR BLD AUTO: 67 % (ref 43–75)
NRBC BLD AUTO-RTO: 0 /100 WBCS
PLATELET # BLD AUTO: 259 THOUSANDS/UL (ref 149–390)
PMV BLD AUTO: 10.1 FL (ref 8.9–12.7)
POTASSIUM SERPL-SCNC: 4.7 MMOL/L (ref 3.5–5.3)
PROT SERPL-MCNC: 7.4 G/DL (ref 6.4–8.4)
RBC # BLD AUTO: 4.6 MILLION/UL (ref 3.81–5.12)
SODIUM SERPL-SCNC: 138 MMOL/L (ref 135–147)
TRIGL SERPL-MCNC: 146 MG/DL (ref ?–150)
TSH SERPL DL<=0.05 MIU/L-ACNC: 0.13 UIU/ML (ref 0.45–4.5)
WBC # BLD AUTO: 9.31 THOUSAND/UL (ref 4.31–10.16)

## 2025-01-10 PROCEDURE — 85025 COMPLETE CBC W/AUTO DIFF WBC: CPT

## 2025-01-10 PROCEDURE — 84439 ASSAY OF FREE THYROXINE: CPT

## 2025-01-10 PROCEDURE — 36415 COLL VENOUS BLD VENIPUNCTURE: CPT

## 2025-01-10 PROCEDURE — 84443 ASSAY THYROID STIM HORMONE: CPT

## 2025-01-10 PROCEDURE — 83036 HEMOGLOBIN GLYCOSYLATED A1C: CPT

## 2025-01-10 PROCEDURE — 80053 COMPREHEN METABOLIC PANEL: CPT

## 2025-01-10 PROCEDURE — 80061 LIPID PANEL: CPT

## 2025-01-10 NOTE — TELEPHONE ENCOUNTER
Called the patient to remind her of her visit on 01/14/2025 with Dr. Galeana. Also informed the patient she needs to complete her labs before the visit

## 2025-01-11 LAB — T4 FREE SERPL-MCNC: 1.11 NG/DL (ref 0.61–1.12)

## 2025-01-14 ENCOUNTER — OFFICE VISIT (OUTPATIENT)
Age: 80
End: 2025-01-14
Payer: COMMERCIAL

## 2025-01-14 VITALS
OXYGEN SATURATION: 96 % | HEIGHT: 64 IN | BODY MASS INDEX: 28.68 KG/M2 | SYSTOLIC BLOOD PRESSURE: 120 MMHG | HEART RATE: 92 BPM | WEIGHT: 168 LBS | DIASTOLIC BLOOD PRESSURE: 60 MMHG | RESPIRATION RATE: 16 BRPM

## 2025-01-14 DIAGNOSIS — I10 PRIMARY HYPERTENSION: Primary | ICD-10-CM

## 2025-01-14 DIAGNOSIS — F32.9 REACTIVE DEPRESSION: ICD-10-CM

## 2025-01-14 DIAGNOSIS — B00.9 HERPES SIMPLEX INFECTION: ICD-10-CM

## 2025-01-14 DIAGNOSIS — F33.9 DEPRESSION, RECURRENT (HCC): ICD-10-CM

## 2025-01-14 DIAGNOSIS — E03.9 ACQUIRED HYPOTHYROIDISM: ICD-10-CM

## 2025-01-14 DIAGNOSIS — E11.9 TYPE 2 DIABETES MELLITUS WITHOUT COMPLICATION, WITHOUT LONG-TERM CURRENT USE OF INSULIN (HCC): ICD-10-CM

## 2025-01-14 DIAGNOSIS — N18.30 STAGE 3 CHRONIC KIDNEY DISEASE, UNSPECIFIED WHETHER STAGE 3A OR 3B CKD (HCC): ICD-10-CM

## 2025-01-14 DIAGNOSIS — E78.2 MIXED HYPERLIPIDEMIA: ICD-10-CM

## 2025-01-14 PROCEDURE — 99214 OFFICE O/P EST MOD 30 MIN: CPT | Performed by: INTERNAL MEDICINE

## 2025-01-14 RX ORDER — METFORMIN HYDROCHLORIDE 500 MG/1
500 TABLET, EXTENDED RELEASE ORAL 3 TIMES DAILY
Start: 2025-01-14

## 2025-01-14 RX ORDER — VALACYCLOVIR HYDROCHLORIDE 1 G/1
2000 TABLET, FILM COATED ORAL 2 TIMES DAILY
Qty: 4 TABLET | Refills: 4 | Status: SHIPPED | OUTPATIENT
Start: 2025-01-14 | End: 2025-01-15

## 2025-01-14 NOTE — ASSESSMENT & PLAN NOTE
Lab Results   Component Value Date    EGFR 57 01/10/2025    EGFR 55 09/05/2024    EGFR 56 07/26/2024    CREATININE 0.95 01/10/2025    CREATININE 0.97 09/05/2024    CREATININE 0.96 07/26/2024     GFR stable at 57.  Implications discussed

## 2025-01-14 NOTE — ASSESSMENT & PLAN NOTE
Suboptimal control with A1c 8.1.  Will increase metformin to 3 tablets/day.  Add Metamucil if troubled by loose bowels.  Continue Jardiance 25.  We discussed consideration of GLP-1 agonist if unable to bring A1c below 7.5  Lab Results   Component Value Date    HGBA1C 8.1 (H) 01/10/2025       Orders:    Basic metabolic panel; Future    CBC and differential; Future    Lipid panel; Future    Hemoglobin A1C; Future    Hepatic function panel; Future    TSH, 3rd generation with Free T4 reflex; Future    Albumin / creatinine urine ratio; Future    metFORMIN (GLUCOPHAGE-XR) 500 mg 24 hr tablet; Take 1 tablet (500 mg total) by mouth 3 (three) times a day She is taking one tablet twice daily.

## 2025-01-14 NOTE — PROGRESS NOTES
Name: Delmi Epps      : 1945      MRN: [id number]  Encounter Provider: Sanford Galeana MD  Encounter Date: 2025   Encounter department: [facility] [address] [address]  :  Assessment & Plan  Herpes simplex infection    Orders:    valACYclovir (VALTREX) 1,000 mg tablet; Take 2 tablets (2,000 mg total) by mouth 2 (two) times a day for 2 doses    Primary hypertension  Stable on Lotrel and aldactone       Acquired hypothyroidism  TSH slightly suppressed with normal free T4.  Continue present dose and will recheck in 6 months       Type 2 diabetes mellitus without complication, without long-term current use of insulin (HCC)  Suboptimal control with A1c 8.1.  Will increase metformin to 3 tablets/day.  Add Metamucil if troubled by loose bowels.  Continue Jardiance 25.  We discussed consideration of GLP-1 agonist if unable to bring A1c below 7.5  Lab Results   Component Value Date    HGBA1C 8.1 (H) 01/10/2025       Orders:    Basic metabolic panel; Future    CBC and differential; Future    Lipid panel; Future    Hemoglobin A1C; Future    Hepatic function panel; Future    TSH, 3rd generation with Free T4 reflex; Future    Albumin / creatinine urine ratio; Future    metFORMIN (GLUCOPHAGE-XR) 500 mg 24 hr tablet; Take 1 tablet (500 mg total) by mouth 3 (three) times a day She is taking one tablet twice daily.    Stage 3 chronic kidney disease, unspecified whether stage 3a or 3b CKD (HCC)  Lab Results   Component Value Date    EGFR 57 01/10/2025    EGFR 55 2024    EGFR 56 2024    CREATININE 0.95 01/10/2025    CREATININE 0.97 2024    CREATININE 0.96 2024     GFR stable at 57.  Implications discussed       Reactive depression  Stable with escitalopram.  We discussed holding off on tapering dose until spring or summer         Mixed hyperlipidemia  LDL at goal on atorvastatin       Depression, recurrent (HCC)  As above                History of Present Illness     F/u mmp and  "review labs  Feeling ok, but multiple issues  Keeping active, but no formal exercise.    HTN/HPL-taking rx as directed, no home bp's  DM- Taking metformin 1000qd and jardiance as directed.  Not as strict w/ diet.  No regular sugar testing.    OAB-stable since surgery, rare urgency and incontinence.  Hypothyroid taking rx as directed.   Depression-worse since Raymond's passing, still w/ bouts of crying, but seems to be improving.       Review of Systems    Objective   /60   Pulse 92   Resp 16   Ht 5' 4\" (1.626 m)   Wt 76.2 kg (168 lb)   SpO2 96%   BMI 28.84 kg/m²      Physical Exam    "

## 2025-01-14 NOTE — ASSESSMENT & PLAN NOTE
Stable with escitalopram.  We discussed holding off on tapering dose until spring or summer

## 2025-01-14 NOTE — ASSESSMENT & PLAN NOTE
Orders:    valACYclovir (VALTREX) 1,000 mg tablet; Take 2 tablets (2,000 mg total) by mouth 2 (two) times a day for 2 doses

## 2025-01-14 NOTE — ASSESSMENT & PLAN NOTE
TSH slightly suppressed with normal free T4.  Continue present dose and will recheck in 6 months

## 2025-04-08 DIAGNOSIS — F32.9 REACTIVE DEPRESSION: Primary | ICD-10-CM

## 2025-04-09 ENCOUNTER — TELEPHONE (OUTPATIENT)
Age: 80
End: 2025-04-09

## 2025-04-09 DIAGNOSIS — R35.0 URINARY FREQUENCY: Primary | ICD-10-CM

## 2025-04-09 RX ORDER — CEPHALEXIN 500 MG/1
500 CAPSULE ORAL 2 TIMES DAILY
Qty: 14 CAPSULE | Refills: 0 | Status: SHIPPED | OUTPATIENT
Start: 2025-04-09 | End: 2025-04-16

## 2025-04-09 RX ORDER — ESCITALOPRAM OXALATE 10 MG/1
5 TABLET ORAL DAILY
Qty: 45 TABLET | Refills: 1 | Status: SHIPPED | OUTPATIENT
Start: 2025-04-09

## 2025-04-09 NOTE — TELEPHONE ENCOUNTER
Patient thinks she has another UTI. She has burning and frequent urination. She is requesting a script but will come in if PCP suggests.  Please call patient, ok to leave a message   Thank you

## 2025-04-10 ENCOUNTER — APPOINTMENT (OUTPATIENT)
Dept: LAB | Facility: HOSPITAL | Age: 80
End: 2025-04-10
Payer: COMMERCIAL

## 2025-04-10 DIAGNOSIS — E11.9 TYPE 2 DIABETES MELLITUS WITHOUT COMPLICATION, WITHOUT LONG-TERM CURRENT USE OF INSULIN (HCC): ICD-10-CM

## 2025-04-10 LAB
BACTERIA UR QL AUTO: ABNORMAL /HPF
BILIRUB UR QL STRIP: NEGATIVE
CLARITY UR: ABNORMAL
COLOR UR: YELLOW
GLUCOSE UR STRIP-MCNC: ABNORMAL MG/DL
HGB UR QL STRIP.AUTO: ABNORMAL
KETONES UR STRIP-MCNC: NEGATIVE MG/DL
LEUKOCYTE ESTERASE UR QL STRIP: ABNORMAL
NITRITE UR QL STRIP: NEGATIVE
NON-SQ EPI CELLS URNS QL MICRO: ABNORMAL /HPF
PH UR STRIP.AUTO: 5.5 [PH]
PROT UR STRIP-MCNC: ABNORMAL MG/DL
RBC #/AREA URNS AUTO: ABNORMAL /HPF
SP GR UR STRIP.AUTO: 1.03 (ref 1–1.03)
UROBILINOGEN UR STRIP-ACNC: <2 MG/DL
WBC #/AREA URNS AUTO: ABNORMAL /HPF
WBC CLUMPS # UR AUTO: PRESENT /UL

## 2025-04-10 PROCEDURE — 81001 URINALYSIS AUTO W/SCOPE: CPT

## 2025-04-10 PROCEDURE — 87086 URINE CULTURE/COLONY COUNT: CPT

## 2025-04-10 PROCEDURE — 87186 SC STD MICRODIL/AGAR DIL: CPT

## 2025-04-10 PROCEDURE — 87077 CULTURE AEROBIC IDENTIFY: CPT

## 2025-04-12 LAB — BACTERIA UR CULT: ABNORMAL

## 2025-04-14 ENCOUNTER — RESULTS FOLLOW-UP (OUTPATIENT)
Age: 80
End: 2025-04-14

## 2025-06-08 DIAGNOSIS — I10 ESSENTIAL HYPERTENSION: ICD-10-CM

## 2025-06-08 RX ORDER — SPIRONOLACTONE 25 MG/1
25 TABLET ORAL DAILY
Qty: 90 TABLET | Refills: 3 | Status: SHIPPED | OUTPATIENT
Start: 2025-06-08

## 2025-06-12 DIAGNOSIS — E03.9 ACQUIRED HYPOTHYROIDISM: ICD-10-CM

## 2025-06-12 RX ORDER — LEVOTHYROXINE SODIUM 100 UG/1
TABLET ORAL
Qty: 30 TABLET | Refills: 0 | Status: SHIPPED | OUTPATIENT
Start: 2025-06-12

## 2025-07-08 DIAGNOSIS — E03.9 ACQUIRED HYPOTHYROIDISM: ICD-10-CM

## 2025-07-08 DIAGNOSIS — E11.9 TYPE 2 DIABETES MELLITUS WITHOUT COMPLICATION, WITHOUT LONG-TERM CURRENT USE OF INSULIN (HCC): ICD-10-CM

## 2025-07-10 RX ORDER — EMPAGLIFLOZIN 25 MG/1
25 TABLET, FILM COATED ORAL EVERY MORNING
Qty: 90 TABLET | Refills: 1 | Status: SHIPPED | OUTPATIENT
Start: 2025-07-10

## 2025-07-10 RX ORDER — LEVOTHYROXINE SODIUM 100 UG/1
TABLET ORAL
Qty: 90 TABLET | Refills: 1 | Status: SHIPPED | OUTPATIENT
Start: 2025-07-10

## 2025-07-25 ENCOUNTER — APPOINTMENT (OUTPATIENT)
Dept: LAB | Facility: HOSPITAL | Age: 80
End: 2025-07-25
Payer: COMMERCIAL

## 2025-07-25 ENCOUNTER — APPOINTMENT (OUTPATIENT)
Dept: LAB | Facility: CLINIC | Age: 80
End: 2025-07-25
Payer: COMMERCIAL

## 2025-07-25 LAB
ALBUMIN SERPL BCG-MCNC: 4.6 G/DL (ref 3.5–5)
ALP SERPL-CCNC: 41 U/L (ref 34–104)
ALT SERPL W P-5'-P-CCNC: 13 U/L (ref 7–52)
ANION GAP SERPL CALCULATED.3IONS-SCNC: 9 MMOL/L (ref 4–13)
AST SERPL W P-5'-P-CCNC: 16 U/L (ref 13–39)
BASOPHILS # BLD AUTO: 0.07 THOUSANDS/ÂΜL (ref 0–0.1)
BASOPHILS NFR BLD AUTO: 1 % (ref 0–1)
BILIRUB DIRECT SERPL-MCNC: 0.13 MG/DL (ref 0–0.2)
BILIRUB SERPL-MCNC: 0.37 MG/DL (ref 0.2–1)
BUN SERPL-MCNC: 26 MG/DL (ref 5–25)
CALCIUM SERPL-MCNC: 9.7 MG/DL (ref 8.4–10.2)
CHLORIDE SERPL-SCNC: 103 MMOL/L (ref 96–108)
CHOLEST SERPL-MCNC: 110 MG/DL (ref ?–200)
CO2 SERPL-SCNC: 25 MMOL/L (ref 21–32)
CREAT SERPL-MCNC: 0.97 MG/DL (ref 0.6–1.3)
CREAT UR-MCNC: 84.4 MG/DL
EOSINOPHIL # BLD AUTO: 0.12 THOUSAND/ÂΜL (ref 0–0.61)
EOSINOPHIL NFR BLD AUTO: 1 % (ref 0–6)
ERYTHROCYTE [DISTWIDTH] IN BLOOD BY AUTOMATED COUNT: 12.6 % (ref 11.6–15.1)
EST. AVERAGE GLUCOSE BLD GHB EST-MCNC: 171 MG/DL
GFR SERPL CREATININE-BSD FRML MDRD: 55 ML/MIN/1.73SQ M
GLUCOSE P FAST SERPL-MCNC: 132 MG/DL (ref 65–99)
HBA1C MFR BLD: 7.6 %
HCT VFR BLD AUTO: 43.3 % (ref 34.8–46.1)
HDLC SERPL-MCNC: 48 MG/DL
HGB BLD-MCNC: 14.4 G/DL (ref 11.5–15.4)
IMM GRANULOCYTES # BLD AUTO: 0.05 THOUSAND/UL (ref 0–0.2)
IMM GRANULOCYTES NFR BLD AUTO: 1 % (ref 0–2)
LDLC SERPL CALC-MCNC: 38 MG/DL (ref 0–100)
LYMPHOCYTES # BLD AUTO: 1.91 THOUSANDS/ÂΜL (ref 0.6–4.47)
LYMPHOCYTES NFR BLD AUTO: 23 % (ref 14–44)
MCH RBC QN AUTO: 32.5 PG (ref 26.8–34.3)
MCHC RBC AUTO-ENTMCNC: 33.3 G/DL (ref 31.4–37.4)
MCV RBC AUTO: 98 FL (ref 82–98)
MICROALBUMIN UR-MCNC: <7 MG/L
MONOCYTES # BLD AUTO: 0.52 THOUSAND/ÂΜL (ref 0.17–1.22)
MONOCYTES NFR BLD AUTO: 6 % (ref 4–12)
NEUTROPHILS # BLD AUTO: 5.79 THOUSANDS/ÂΜL (ref 1.85–7.62)
NEUTS SEG NFR BLD AUTO: 68 % (ref 43–75)
NONHDLC SERPL-MCNC: 62 MG/DL
NRBC BLD AUTO-RTO: 0 /100 WBCS
PLATELET # BLD AUTO: 248 THOUSANDS/UL (ref 149–390)
PMV BLD AUTO: 10.1 FL (ref 8.9–12.7)
POTASSIUM SERPL-SCNC: 4.3 MMOL/L (ref 3.5–5.3)
PROT SERPL-MCNC: 7.7 G/DL (ref 6.4–8.4)
RBC # BLD AUTO: 4.43 MILLION/UL (ref 3.81–5.12)
SODIUM SERPL-SCNC: 137 MMOL/L (ref 135–147)
TRIGL SERPL-MCNC: 120 MG/DL (ref ?–150)
TSH SERPL DL<=0.05 MIU/L-ACNC: 1.02 UIU/ML (ref 0.45–4.5)
WBC # BLD AUTO: 8.46 THOUSAND/UL (ref 4.31–10.16)

## 2025-07-29 ENCOUNTER — OFFICE VISIT (OUTPATIENT)
Age: 80
End: 2025-07-29
Payer: COMMERCIAL

## 2025-07-29 VITALS
HEIGHT: 64 IN | WEIGHT: 162 LBS | DIASTOLIC BLOOD PRESSURE: 60 MMHG | BODY MASS INDEX: 27.66 KG/M2 | SYSTOLIC BLOOD PRESSURE: 120 MMHG | HEART RATE: 92 BPM | OXYGEN SATURATION: 99 %

## 2025-07-29 DIAGNOSIS — F33.9 DEPRESSION, RECURRENT (HCC): ICD-10-CM

## 2025-07-29 DIAGNOSIS — I10 PRIMARY HYPERTENSION: ICD-10-CM

## 2025-07-29 DIAGNOSIS — Z78.0 MENOPAUSE: ICD-10-CM

## 2025-07-29 DIAGNOSIS — E78.2 MIXED HYPERLIPIDEMIA: ICD-10-CM

## 2025-07-29 DIAGNOSIS — E03.9 ACQUIRED HYPOTHYROIDISM: ICD-10-CM

## 2025-07-29 DIAGNOSIS — F32.9 REACTIVE DEPRESSION: ICD-10-CM

## 2025-07-29 DIAGNOSIS — E11.9 TYPE 2 DIABETES MELLITUS WITHOUT COMPLICATION, WITHOUT LONG-TERM CURRENT USE OF INSULIN (HCC): Primary | ICD-10-CM

## 2025-07-29 PROCEDURE — G2211 COMPLEX E/M VISIT ADD ON: HCPCS | Performed by: INTERNAL MEDICINE

## 2025-07-29 PROCEDURE — 99214 OFFICE O/P EST MOD 30 MIN: CPT | Performed by: INTERNAL MEDICINE

## 2025-07-29 PROCEDURE — G0439 PPPS, SUBSEQ VISIT: HCPCS | Performed by: INTERNAL MEDICINE

## 2025-07-29 RX ORDER — ESCITALOPRAM OXALATE 10 MG/1
10 TABLET ORAL DAILY
Start: 2025-07-29

## (undated) DEVICE — GLOVE INDICATOR PI UNDERGLOVE SZ 7.5 BLUE

## (undated) DEVICE — BULB SYRINGE,IRRIGATION WITH PROTECTIVE CAP: Brand: DOVER

## (undated) DEVICE — NEEDLE HYPO 22G X 1-1/2 IN

## (undated) DEVICE — VIAL DECANTER

## (undated) DEVICE — SUT VICRYL 2-0 SH 27 IN UNDYED J417H

## (undated) DEVICE — EXIDINE 4 PCT

## (undated) DEVICE — BAG URINE DRAINAGE 2000ML ANTI RFLX LF

## (undated) DEVICE — UNDER BUTTOCKS DRAPE W/FLUID CONTROL POUCH: Brand: CONVERTORS

## (undated) DEVICE — CURITY PLAIN PACKING STRIP: Brand: CURITY

## (undated) DEVICE — SMOKE EVACUATION TUBING WITH 8 IN INTEGRAL WAND AND SPONGE GUARD: Brand: BUFFALO FILTER

## (undated) DEVICE — GLOVE PI ULTRA TOUCH SZ.8.0

## (undated) DEVICE — SUT PDS II 2-0 CT-2 27 IN Z333H

## (undated) DEVICE — PREMIUM DRY TRAY LF: Brand: MEDLINE INDUSTRIES, INC.

## (undated) DEVICE — MEDI-VAC YANKAUER SUCTION HANDLE W/BULBOUS AND CONTROL VENT: Brand: CARDINAL HEALTH

## (undated) DEVICE — GLOVE PI ULTRA TOUCH SZ 6

## (undated) DEVICE — SUT VICRYL 0 CT-1 36 IN J946H

## (undated) DEVICE — NEEDLE 18 G X 1 1/2

## (undated) DEVICE — BAG DECANTER

## (undated) DEVICE — SCD SEQUENTIAL COMPRESSION COMFORT SLEEVE MEDIUM KNEE LENGTH: Brand: KENDALL SCD

## (undated) DEVICE — ALLENTOWN DR  LUCENTE S LAP PK: Brand: CARDINAL HEALTH

## (undated) DEVICE — STANDARD SURGICAL GOWN, L: Brand: CONVERTORS

## (undated) DEVICE — GLOVE INDICATOR PI UNDERGLOVE SZ 6.5 BLUE

## (undated) DEVICE — CATH FOLEY 18FR 5ML 2 WAY UNCOATED SILICONE

## (undated) DEVICE — GLOVE PI ULTRA TOUCH SZ.7.0

## (undated) DEVICE — GLOVE PI ULTRA TOUCH SZ.6.5

## (undated) DEVICE — 2000CC GUARDIAN II: Brand: GUARDIAN

## (undated) DEVICE — CAUTERY TIP POLISHER: Brand: DEVON

## (undated) DEVICE — GLOVE INDICATOR PI UNDERGLOVE SZ 7 BLUE

## (undated) DEVICE — FLOSEAL HEMOSTATIC MATRIX, 5 ML: Brand: FLOSEAL

## (undated) DEVICE — TUBING SUCTION 5MM X 12 FT

## (undated) DEVICE — ELECTROSURGICAL DEVICE HOLSTER;FOR USE WITH MAXIMUM PEAK VOLTAGE OF 4000 V: Brand: FORCE TRIVERSE

## (undated) DEVICE — NEEDLE EPID TUOHY 18G X 3.5IN  WNG CLR LF

## (undated) DEVICE — INTENDED FOR TISSUE SEPARATION, AND OTHER PROCEDURES THAT REQUIRE A SHARP SURGICAL BLADE TO PUNCTURE OR CUT.: Brand: BARD-PARKER SAFETY BLADES SIZE 11, STERILE

## (undated) DEVICE — IV FLUSH NSS 10ML POSIFLUSH

## (undated) DEVICE — DRAPE FLUID WARMER (BIRD BATH)